# Patient Record
Sex: FEMALE | Race: WHITE | NOT HISPANIC OR LATINO | Employment: FULL TIME | URBAN - METROPOLITAN AREA
[De-identification: names, ages, dates, MRNs, and addresses within clinical notes are randomized per-mention and may not be internally consistent; named-entity substitution may affect disease eponyms.]

---

## 2019-04-19 ENCOUNTER — OFFICE VISIT (OUTPATIENT)
Dept: OBSTETRICS AND GYNECOLOGY | Age: 24
End: 2019-04-19

## 2019-04-19 VITALS
DIASTOLIC BLOOD PRESSURE: 68 MMHG | SYSTOLIC BLOOD PRESSURE: 114 MMHG | BODY MASS INDEX: 45.11 KG/M2 | HEIGHT: 64 IN | WEIGHT: 264.2 LBS

## 2019-04-19 DIAGNOSIS — Z01.419 WELL WOMAN EXAM WITH ROUTINE GYNECOLOGICAL EXAM: Primary | ICD-10-CM

## 2019-04-19 PROCEDURE — 99385 PREV VISIT NEW AGE 18-39: CPT | Performed by: NURSE PRACTITIONER

## 2019-04-19 RX ORDER — LEVONORGESTREL AND ETHINYL ESTRADIOL 0.1-0.02MG
1 KIT ORAL DAILY
Qty: 90 TABLET | Refills: 3 | Status: SHIPPED | OUTPATIENT
Start: 2019-04-19 | End: 2019-07-11 | Stop reason: SDUPTHER

## 2019-04-19 NOTE — PROGRESS NOTES
[facility]  Routine Annual Visit    2019    Patient: Desi Pierre          MR#:[id number]      History of Present Illness    23 y.o. female  who presents for annual exam as a new patient. Desi reports today will be her first pap smear- no prior GYN care. She reports normal, monthly cycles. She is sexually active with one partner and using condoms for contraception but is interested in starting oral contraceptives. Declines LARC. She denies any medical hx. She is working as an . She has no complaints today. Desi knows she needs to start watching her diet and increase her activity.     Patient's last menstrual period was 2019 (approximate).  Obstetric History:  OB History      Para Term  AB Living    0 0 0 0 0 0    SAB TAB Ectopic Molar Multiple Live Births    0 0 0 0 0 0         Menstrual History:  Menarche Age: 10 years  Patient's last menstrual period was 2019 (approximate).  Period Cycle (Days): 30  Period Duration (Days): 5  Dysmenorrhea: (!) Moderate  Dysmenorrhea Symptoms: Cramping    Sexual History:       ________________________________________  There is no problem list on file for this patient.      History reviewed. No pertinent past medical history.    History reviewed. No pertinent surgical history.    Social History     Tobacco Use   Smoking Status Never Smoker       Family History   Problem Relation Age of Onset   • Diabetes Mother    • Diabetes Father    • Polycystic ovary syndrome Sister        Prior to Admission medications    Medication Sig Start Date End Date Taking? Authorizing Provider   Loratadine-Pseudoephedrine (CLARITIN-D 12 HOUR PO) Take  by mouth.   Yes Provider, MD Erin     ________________________________________    The following portions of the patient's history were reviewed and updated as appropriate: allergies, current medications, past family history, past medical history, past social history, past surgical  "history and problem list.    Review of Systems   Constitutional: Negative.    HENT: Negative.    Eyes: Negative for visual disturbance.   Respiratory: Negative for cough, shortness of breath and wheezing.    Cardiovascular: Negative for chest pain, palpitations and leg swelling.   Gastrointestinal: Negative for abdominal distention, abdominal pain, blood in stool, constipation, diarrhea, nausea and vomiting.   Endocrine: Negative for cold intolerance and heat intolerance.   Genitourinary: Negative for difficulty urinating, dyspareunia, dysuria, frequency, genital sores, hematuria, menstrual problem, pelvic pain, urgency, vaginal bleeding, vaginal discharge and vaginal pain.   Musculoskeletal: Negative.    Skin: Negative.    Neurological: Negative for dizziness, weakness, light-headedness, numbness and headaches.   Hematological: Negative.    Psychiatric/Behavioral: Negative.    Breasts: negative for lumps skin changes, dimpling, swelling, nipple changes/discharge bilaterally       Objective   Physical Exam    /68   Ht 162.6 cm (64\")   Wt 120 kg (264 lb 3.2 oz)   LMP 03/27/2019 (Approximate)   Breastfeeding? No   BMI 45.35 kg/m²    BP Readings from Last 3 Encounters:   04/19/19 114/68      Wt Readings from Last 3 Encounters:   04/19/19 120 kg (264 lb 3.2 oz)        BMI: Estimated body mass index is 45.35 kg/m² as calculated from the following:    Height as of this encounter: 162.6 cm (64\").    Weight as of this encounter: 120 kg (264 lb 3.2 oz).        General:   alert, appears stated age and cooperative   Heart: regular rate and rhythm, S1, S2 normal, no murmur, click, rub or gallop   Lungs: clear to auscultation bilaterally   Abdomen: soft, non-tender, without masses or organomegaly   Breast: inspection negative, no nipple discharge or bleeding, no masses or nodularity palpable   Vulva: normal   Vagina: normal mucosa, normal discharge   Cervix: no cervical motion tenderness, no lesions and nulliparous " appearance   Uterus: exam is limited habitus    Adnexa: exam limited by habitus     Assessment:    1. Normal annual exam   2. OCP: initiation of contraceptive pills. We have discussed the risks and benefits of oral contraceptives. We have discussed the health benefits of oral contraceptives including improvement of acne, dysmenorrhea, PMS, decreased risk ovarian and uterine cancer later in life and decreased menstrual flow or suppression. We have also discussed possible side effects including nausea or vomiting at initiation, breast tenderness, and increased risk deep vein thrombosis. We have reviewed the ACHES (abdominal pain, chest pain, headache, leg pain, vision changes, shortness of breath) and to notify our office in the event of any of these symptoms. Proper use and start method discussed. Follow up 3 months for BP check.  3.  Counseled on healthy lifestyle modifications, safe sex, condom use, and self breast exams.          Plan:    [x]  Rx: lutera  []  Mammogram request made  [x]  PAP done  []  Occult fecal blood test (Insure)  []  Labs:   [x]  GC/Chl/TV  []  DEXA scan   []  Referral for colonoscopy:           ANGI Teran  4/19/2019 9:21 AM

## 2019-04-22 LAB
C TRACH RRNA SPEC QL NAA+PROBE: NEGATIVE
N GONORRHOEA RRNA SPEC QL NAA+PROBE: NEGATIVE
T VAGINALIS RRNA VAG QL NAA+PROBE: NEGATIVE

## 2019-04-23 ENCOUNTER — TELEPHONE (OUTPATIENT)
Dept: OBSTETRICS AND GYNECOLOGY | Age: 24
End: 2019-04-23

## 2019-04-23 LAB
CONV .: NORMAL
CYTOLOGIST CVX/VAG CYTO: NORMAL
CYTOLOGY CVX/VAG DOC THIN PREP: NORMAL
DX ICD CODE: NORMAL
HIV 1 & 2 AB SER-IMP: NORMAL
OTHER STN SPEC: NORMAL
PATH REPORT.FINAL DX SPEC: NORMAL
STAT OF ADQ CVX/VAG CYTO-IMP: NORMAL

## 2019-04-23 RX ORDER — FLUCONAZOLE 150 MG/1
150 TABLET ORAL ONCE
Qty: 1 TABLET | Refills: 0 | Status: SHIPPED | OUTPATIENT
Start: 2019-04-23 | End: 2019-04-23

## 2019-04-23 NOTE — TELEPHONE ENCOUNTER
----- Message from ANGI Connolly sent at 4/23/2019  8:48 AM EDT -----  Please inform patient her pap smear returned negative (normal). It did suggest she has a yeast infection so I have sent a diflucan to her pharmacy. Thank you.

## 2019-07-11 ENCOUNTER — TELEPHONE (OUTPATIENT)
Dept: OBSTETRICS AND GYNECOLOGY | Age: 24
End: 2019-07-11

## 2019-07-11 RX ORDER — LEVONORGESTREL AND ETHINYL ESTRADIOL 0.1-0.02MG
1 KIT ORAL DAILY
Qty: 90 TABLET | Refills: 3 | Status: SHIPPED | OUTPATIENT
Start: 2019-07-11 | End: 2020-06-03 | Stop reason: SDUPTHER

## 2019-07-11 NOTE — TELEPHONE ENCOUNTER
Patient called and would like you to send over a prescription for [facility]

## 2020-06-03 ENCOUNTER — OFFICE VISIT (OUTPATIENT)
Dept: OBSTETRICS AND GYNECOLOGY | Age: 25
End: 2020-06-03

## 2020-06-03 VITALS
BODY MASS INDEX: 42.2 KG/M2 | WEIGHT: 247.2 LBS | HEIGHT: 64 IN | SYSTOLIC BLOOD PRESSURE: 120 MMHG | DIASTOLIC BLOOD PRESSURE: 76 MMHG

## 2020-06-03 DIAGNOSIS — Z01.419 WELL WOMAN EXAM WITH ROUTINE GYNECOLOGICAL EXAM: Primary | ICD-10-CM

## 2020-06-03 PROCEDURE — 99395 PREV VISIT EST AGE 18-39: CPT | Performed by: NURSE PRACTITIONER

## 2020-06-03 RX ORDER — LEVONORGESTREL AND ETHINYL ESTRADIOL 0.1-0.02MG
1 KIT ORAL DAILY
Qty: 90 TABLET | Refills: 3 | Status: SHIPPED | OUTPATIENT
Start: 2020-06-03 | End: 2020-06-05 | Stop reason: SDUPTHER

## 2020-06-03 NOTE — PROGRESS NOTES
[facility]  Routine Annual Visit    6/3/2020    Patient: Desi Pierre          MR#:[id number]      History of Present Illness    25 y.o. female  who presents for annual exam.    Last pap negative 2019.  Continues on OCPs with good cycle control and no problem. Requests refill.  Desi denies new medical hx.   Denies new partners.     Nearly 20 pound weight loss noted and Desi is commended on this! Continued weight loss encouraged.  We discussed need for routine labs including diabetes screening- she reports yearly biometric screening. Encouraged yearly physicals with Dr. García as well.    Desi voices no complaints or concerns today.    She is now working as a  and loves it!    Patient's last menstrual period was 2020 (approximate).  Obstetric History:  OB History        0    Para   0    Term   0       0    AB   0    Living   0       SAB   0    TAB   0    Ectopic   0    Molar   0    Multiple   0    Live Births   0               Menstrual History:     Patient's last menstrual period was 2020 (approximate).       Sexual History:       ________________________________________  There is no problem list on file for this patient.      History reviewed. No pertinent past medical history.    History reviewed. No pertinent surgical history.    Social History     Tobacco Use   Smoking Status Never Smoker       Family History   Problem Relation Age of Onset   • Diabetes Mother    • Diabetes Father    • Polycystic ovary syndrome Sister        Prior to Admission medications    Medication Sig Start Date End Date Taking? Authorizing Provider   levonorgestrel-ethinyl estradiol (AVIANE,ALESSE,LESSINA) 0.1-20 MG-MCG per tablet Take 1 tablet by mouth Daily. 7/11/19 7/10/20 Yes Bridgette Barker APRN   Loratadine-Pseudoephedrine (CLARITIN-D 12 HOUR PO) Take  by mouth.   Yes ProviderErin MD     ________________________________________    The following  "portions of the patient's history were reviewed and updated as appropriate: allergies, current medications, past family history, past medical history, past social history, past surgical history and problem list.    Review of Systems   Constitutional: Negative.    HENT: Negative.    Eyes: Negative for visual disturbance.   Respiratory: Negative for cough, shortness of breath and wheezing.    Cardiovascular: Negative for chest pain, palpitations and leg swelling.   Gastrointestinal: Negative for abdominal distention, abdominal pain, blood in stool, constipation, diarrhea, nausea and vomiting.   Endocrine: Negative for cold intolerance and heat intolerance.   Genitourinary: Negative for difficulty urinating, dyspareunia, dysuria, frequency, genital sores, hematuria, menstrual problem, pelvic pain, urgency, vaginal bleeding, vaginal discharge and vaginal pain.   Musculoskeletal: Negative.    Skin: Negative.    Neurological: Negative for dizziness, weakness, light-headedness, numbness and headaches.   Hematological: Negative.    Psychiatric/Behavioral: Negative.    Breasts: negative for lumps skin changes, dimpling, swelling, nipple changes/discharge bilaterally         Objective   Physical Exam    /76   Ht 162.6 cm (64\")   Wt 112 kg (247 lb 3.2 oz)   LMP 05/11/2020 (Approximate)   Breastfeeding No   BMI 42.43 kg/m²    BP Readings from Last 3 Encounters:   06/03/20 120/76   04/19/19 114/68      Wt Readings from Last 3 Encounters:   06/03/20 112 kg (247 lb 3.2 oz)   04/19/19 120 kg (264 lb 3.2 oz)        BMI: Estimated body mass index is 42.43 kg/m² as calculated from the following:    Height as of this encounter: 162.6 cm (64\").    Weight as of this encounter: 112 kg (247 lb 3.2 oz).          General:   alert, appears stated age and cooperative   Heart: regular rate and rhythm, S1, S2 normal, no murmur, click, rub or gallop   Lungs: clear to auscultation bilaterally   Abdomen: soft, non-tender, without masses " or organomegaly   Breast: inspection negative, no nipple discharge or bleeding, no masses or nodularity palpable   Vulva: normal   Vagina: normal mucosa, normal discharge   Cervix: no cervical motion tenderness and no lesions   Uterus: normal size, mobile, non-tender or normal shape and consistency   Adnexa: exam limited by habitus     Assessment:    1. Normal annual exam   2. Contraception- doing well on OCPs and desires to continue. Risks, benefits, alternatives, and proper use reinforced.   3.  Counseled on healthy lifestyle modifications, safe sex, condom use, and self breast exams.    All of the patient's questions were addressed and answered, I have encouraged her to call for today's test results if she has not received them within 10 days.  Patient is advised to call with any change in her condition or with any other questions, otherwise return in 12 months for annual examination.    Plan:    []  Rx:   []  Mammogram request made  [x]  PAP done  []  Occult fecal blood test (Insure)  []  Labs:   []  GC/Chl/TV  []  DEXA scan   []  Referral for colonoscopy:           ANGI Teran  6/3/2020 13:57

## 2020-06-05 ENCOUNTER — TELEPHONE (OUTPATIENT)
Dept: OBSTETRICS AND GYNECOLOGY | Age: 25
End: 2020-06-05

## 2020-06-05 LAB
CONV .: NORMAL
CYTOLOGIST CVX/VAG CYTO: NORMAL
CYTOLOGY CVX/VAG DOC CYTO: NORMAL
CYTOLOGY CVX/VAG DOC THIN PREP: NORMAL
DX ICD CODE: NORMAL
HIV 1 & 2 AB SER-IMP: NORMAL
OTHER STN SPEC: NORMAL
STAT OF ADQ CVX/VAG CYTO-IMP: NORMAL

## 2020-06-05 RX ORDER — LEVONORGESTREL AND ETHINYL ESTRADIOL 0.1-0.02MG
1 KIT ORAL DAILY
Qty: 90 TABLET | Refills: 3 | Status: SHIPPED | OUTPATIENT
Start: 2020-06-05 | End: 2021-04-29

## 2020-06-05 NOTE — TELEPHONE ENCOUNTER
(Bridgette pt) Pt was needing her bcp that were called in yesterday [facility]. Can we fix this?

## 2020-06-05 NOTE — TELEPHONE ENCOUNTER
I must've heard the voicemail wrong, called patient to clarify her insurance changed and they no longer need it [facility]. Pt would like it sent to the [facility] listed on her file!

## 2020-06-08 ENCOUNTER — TELEPHONE (OUTPATIENT)
Dept: OBSTETRICS AND GYNECOLOGY | Age: 25
End: 2020-06-08

## 2020-06-08 NOTE — TELEPHONE ENCOUNTER
----- Message from ANGI Connolly sent at 6/5/2020 12:45 PM EDT -----  Please inform patient her pap smear returned negative (normal). Thank you.

## 2021-04-29 RX ORDER — LEVONORGESTREL AND ETHINYL ESTRADIOL 0.1-0.02MG
KIT ORAL
Qty: 84 TABLET | Refills: 0 | Status: SHIPPED | OUTPATIENT
Start: 2021-04-29 | End: 2021-07-27

## 2021-06-11 ENCOUNTER — OFFICE VISIT (OUTPATIENT)
Dept: OBSTETRICS AND GYNECOLOGY | Age: 26
End: 2021-06-11

## 2021-06-11 VITALS
SYSTOLIC BLOOD PRESSURE: 132 MMHG | BODY MASS INDEX: 42 KG/M2 | WEIGHT: 246 LBS | HEIGHT: 64 IN | DIASTOLIC BLOOD PRESSURE: 88 MMHG

## 2021-06-11 DIAGNOSIS — Z01.419 WELL WOMAN EXAM WITH ROUTINE GYNECOLOGICAL EXAM: Primary | ICD-10-CM

## 2021-06-11 PROCEDURE — 99395 PREV VISIT EST AGE 18-39: CPT | Performed by: NURSE PRACTITIONER

## 2021-06-11 NOTE — PROGRESS NOTES
[facility]  Routine Annual Visit    2021    Patient: Desi Pierre          MR#:[id number]      History of Present Illness    26 y.o. female  who presents for annual exam.    Continues on OCPs with good cycle control and no problems- requests refill  Denies new medical hx  Teaching still going well!  Got engaged last year! Wedding set for 2022    BP mildly elevated today- repeat 124/88- recommend recheck in 2 weeks    No LMP recorded.  Obstetric History:  OB History        0    Para   0    Term   0       0    AB   0    Living   0       SAB   0    TAB   0    Ectopic   0    Molar   0    Multiple   0    Live Births   0               Menstrual History:     No LMP recorded.       Sexual History:       ________________________________________  There is no problem list on file for this patient.      History reviewed. No pertinent past medical history.    History reviewed. No pertinent surgical history.    Social History     Tobacco Use   Smoking Status Never Smoker       Family History   Problem Relation Age of Onset   • Diabetes Mother    • Diabetes Father    • Polycystic ovary syndrome Sister        Prior to Admission medications    Medication Sig Start Date End Date Taking? Authorizing Provider   Loratadine-Pseudoephedrine (CLARITIN-D 12 HOUR PO) Take  by mouth.    Provider, Historical, MD   Vienva 0.1-20 MG-MCG per tablet TAKE ONE TABLET BY MOUTH DAILY 21   Bridgette Barker, APRN     ________________________________________    The following portions of the patient's history were reviewed and updated as appropriate: allergies, current medications, past family history, past medical history, past social history, past surgical history and problem list.    Review of Systems   Constitutional: Negative.    HENT: Negative.    Eyes: Negative for visual disturbance.   Respiratory: Negative for cough, shortness of breath and wheezing.    Cardiovascular: Negative for chest pain,  "palpitations and leg swelling.   Gastrointestinal: Negative for abdominal distention, abdominal pain, blood in stool, constipation, diarrhea, nausea and vomiting.   Endocrine: Negative for cold intolerance and heat intolerance.   Genitourinary: Negative for difficulty urinating, dyspareunia, dysuria, frequency, genital sores, hematuria, menstrual problem, pelvic pain, urgency, vaginal bleeding, vaginal discharge and vaginal pain.   Musculoskeletal: Negative.    Skin: Negative.    Neurological: Negative for dizziness, weakness, light-headedness, numbness and headaches.   Hematological: Negative.    Psychiatric/Behavioral: Negative.    Breasts: negative for lumps skin changes, dimpling, swelling, nipple changes/discharge bilaterally       Objective   Physical Exam    /88   Ht 162.6 cm (64\")   Wt 112 kg (246 lb)   Breastfeeding No   BMI 42.23 kg/m²    BP Readings from Last 3 Encounters:   06/11/21 132/88   06/03/20 120/76   04/19/19 114/68      Wt Readings from Last 3 Encounters:   06/11/21 112 kg (246 lb)   06/03/20 112 kg (247 lb 3.2 oz)   04/19/19 120 kg (264 lb 3.2 oz)        BMI: Estimated body mass index is 42.23 kg/m² as calculated from the following:    Height as of this encounter: 162.6 cm (64\").    Weight as of this encounter: 112 kg (246 lb).          General:   alert, appears stated age and cooperative   Heart: regular rate and rhythm, S1, S2 normal, no murmur, click, rub or gallop   Lungs: clear to auscultation bilaterally   Abdomen: soft, non-tender, without masses or organomegaly   Breast: inspection negative, no nipple discharge or bleeding, no masses or nodularity palpable   Vulva: External genitalia including bartholin's glands, Urethra, [place]'s gland and urethra meatus are normal, Perineum, rectum and anus appear normal  and Bladder appears normal without significant prolapse    Vagina: normal mucosa, normal discharge   Cervix: no cervical motion tenderness and no lesions   Uterus: normal " size, mobile, non-tender and normal shape and consistency   Adnexa: no mass, fullness, tenderness     Assessment:    Diagnoses and all orders for this visit:    1. Well woman exam with routine gynecological exam (Primary)  -     IgP, Aptima HPV        Healthy lifestyle modifications discussed, counseled on self breast exams and bone health    All of the patient's questions were addressed and answered, I have encouraged her to call for today's test results if she has not received them within 10 days.  Patient is advised to call with any change in her condition or with any other questions, otherwise return in 12 months for annual examination.    Bridgette Barker, ANGI  6/11/2021 10:53 EDT

## 2021-06-15 ENCOUNTER — TELEPHONE (OUTPATIENT)
Dept: OBSTETRICS AND GYNECOLOGY | Age: 26
End: 2021-06-15

## 2021-06-15 LAB
CYTOLOGIST CVX/VAG CYTO: NORMAL
CYTOLOGY CVX/VAG DOC CYTO: NORMAL
CYTOLOGY CVX/VAG DOC THIN PREP: NORMAL
DX ICD CODE: NORMAL
HIV 1 & 2 AB SER-IMP: NORMAL
HPV I/H RISK 4 DNA CVX QL PROBE+SIG AMP: NEGATIVE
OTHER STN SPEC: NORMAL
STAT OF ADQ CVX/VAG CYTO-IMP: NORMAL

## 2021-06-30 ENCOUNTER — TELEPHONE (OUTPATIENT)
Dept: OBSTETRICS AND GYNECOLOGY | Age: 26
End: 2021-06-30

## 2021-07-27 RX ORDER — LEVONORGESTREL AND ETHINYL ESTRADIOL 0.1-0.02MG
KIT ORAL
Qty: 84 TABLET | Refills: 3 | Status: SHIPPED | OUTPATIENT
Start: 2021-07-27 | End: 2022-06-03 | Stop reason: SDUPTHER

## 2022-06-03 ENCOUNTER — TELEPHONE (OUTPATIENT)
Dept: OBSTETRICS AND GYNECOLOGY | Age: 27
End: 2022-06-03

## 2022-06-03 RX ORDER — LEVONORGESTREL AND ETHINYL ESTRADIOL 0.1-0.02MG
1 KIT ORAL DAILY
Qty: 84 TABLET | Refills: 3 | Status: SHIPPED | OUTPATIENT
Start: 2022-06-03

## 2022-06-03 NOTE — TELEPHONE ENCOUNTER
Provider: ANGI THOMAS  Caller: DHIRAJ ARAUJO  Relationship to Patient: SELF  Phone Number: [phone]  Reason for Call: PT CALLED IN TO SCHEDULE ANNUAL APPT (07/21/22). PT STATED SHE WOULD RUN OUT OF THE VIENVA PRIOR TO THE APPT. WANTS TO KNOW IF IT COULD REFILLED.     PT WOULD LIKE A CALL BACK, ANYTIME, OKAY TO LEAVE A VM

## 2022-07-21 ENCOUNTER — OFFICE VISIT (OUTPATIENT)
Dept: OBSTETRICS AND GYNECOLOGY | Age: 27
End: 2022-07-21

## 2022-07-21 VITALS
DIASTOLIC BLOOD PRESSURE: 88 MMHG | WEIGHT: 233.8 LBS | SYSTOLIC BLOOD PRESSURE: 132 MMHG | BODY MASS INDEX: 39.91 KG/M2 | HEIGHT: 64 IN

## 2022-07-21 DIAGNOSIS — Z01.419 WELL WOMAN EXAM WITH ROUTINE GYNECOLOGICAL EXAM: Primary | ICD-10-CM

## 2022-07-21 PROCEDURE — 99395 PREV VISIT EST AGE 18-39: CPT | Performed by: NURSE PRACTITIONER

## 2022-07-21 RX ORDER — LORATADINE AND PSEUDOEPHEDRINE SULFATE 10; 240 MG/1; MG/1
1 TABLET, EXTENDED RELEASE ORAL DAILY
COMMUNITY

## 2022-07-21 NOTE — PROGRESS NOTES
[facility]  Routine Annual Visit    2022    Patient: Desi Pierre          MR#:[id number]      History of Present Illness    27 y.o. female  who presents for annual exam with no complaints and no change in medical history    Got  earlier this month! No plans for children in the next year. Would like to continue on OCPs. BP mildly elevated today- repeat 128/86. Recommend monitoring and following up with PCP. Reinforced contraindication with OCPs and hypertension.    Patient's last menstrual period was 2022 (approximate).  Obstetric History:  OB History        0    Para   0    Term   0       0    AB   0    Living   0       SAB   0    IAB   0    Ectopic   0    Molar   0    Multiple   0    Live Births   0               Menstrual History:     Patient's last menstrual period was 2022 (approximate).       Sexual History:       ________________________________________  There is no problem list on file for this patient.      History reviewed. No pertinent past medical history.    History reviewed. No pertinent surgical history.    Social History     Tobacco Use   Smoking Status Never Smoker   Smokeless Tobacco Never Used       Family History   Problem Relation Age of Onset   • Diabetes Mother    • Diabetes Father    • Polycystic ovary syndrome Sister        Prior to Admission medications    Medication Sig Start Date End Date Taking? Authorizing Provider   levonorgestrel-ethinyl estradiol (Vienva) 0.1-20 MG-MCG per tablet Take 1 tablet by mouth Daily. 6/3/22  Yes Nora Dodd APRN   loratadine-pseudoephedrine (Claritin-D 24 Hour)  MG per 24 hr tablet Take 1 tablet by mouth Daily.   Yes Provider, Historical, MD       The following portions of the patient's history were reviewed and updated as appropriate: allergies, current medications, past family history, past medical history, past social history, past surgical history and problem list.    Review of  "Systems   Constitutional: Negative.    HENT: Negative.    Eyes: Negative for visual disturbance.   Respiratory: Negative for cough, shortness of breath and wheezing.    Cardiovascular: Negative for chest pain, palpitations and leg swelling.   Gastrointestinal: Negative for abdominal distention, abdominal pain, blood in stool, constipation, diarrhea, nausea and vomiting.   Endocrine: Negative for cold intolerance and heat intolerance.   Genitourinary: Negative for difficulty urinating, dyspareunia, dysuria, frequency, genital sores, hematuria, menstrual problem, pelvic pain, urgency, vaginal bleeding, vaginal discharge and vaginal pain.   Musculoskeletal: Negative.    Skin: Negative.    Neurological: Negative for dizziness, weakness, light-headedness, numbness and headaches.   Hematological: Negative.    Psychiatric/Behavioral: Negative.    Breasts: negative for lumps skin changes, dimpling, swelling, nipple changes/discharge bilaterally         Objective   Physical Exam    /88   Ht 162.6 cm (64\")   Wt 106 kg (233 lb 12.8 oz)   LMP 06/27/2022 (Approximate)   Breastfeeding No   BMI 40.13 kg/m²    BP Readings from Last 3 Encounters:   07/21/22 132/88   06/30/22 131/85   06/11/21 132/88      Wt Readings from Last 3 Encounters:   07/21/22 106 kg (233 lb 12.8 oz)   06/30/22 104 kg (230 lb)   06/11/21 112 kg (246 lb)        BMI: Estimated body mass index is 40.13 kg/m² as calculated from the following:    Height as of this encounter: 162.6 cm (64\").    Weight as of this encounter: 106 kg (233 lb 12.8 oz).            General:   alert, appears stated age and cooperative   Heart: regular rate and rhythm, S1, S2 normal, no murmur, click, rub or gallop   Lungs: clear to auscultation bilaterally   Abdomen: soft, non-tender, without masses or organomegaly   Breast: inspection negative, no nipple discharge or bleeding, no masses or nodularity palpable   Vulva: External genitalia including bartholin's glands, Urethra, " [place]'s gland and urethra meatus are normal, Perineum, rectum and anus appear normal  and Bladder appears normal without significant prolapse    Vagina: normal mucosa, normal discharge   Cervix: no cervical motion tenderness and no lesions   Uterus: normal size, mobile, non-tender and normal shape and consistency   Adnexa: no mass, fullness, tenderness     Assessment:    Diagnoses and all orders for this visit:    1. Well woman exam with routine gynecological exam (Primary)  -     IGP, Rfx Aptima HPV ASCU      Healthy lifestyle modifications discussed, counseled on self breast exams and bone health    All of the patient's questions were addressed and answered, I have encouraged her to call for today's test results if she has not received them within 10 days.  Patient is advised to call with any change in her condition or with any other questions, otherwise return in 12 months for annual examination.        Bridgette Barker, APRN  7/21/2022 12:47 EDT

## 2022-07-26 LAB
CONV .: NORMAL
CYTOLOGIST CVX/VAG CYTO: NORMAL
CYTOLOGY CVX/VAG DOC CYTO: NORMAL
CYTOLOGY CVX/VAG DOC THIN PREP: NORMAL
DX ICD CODE: NORMAL
HIV 1 & 2 AB SER-IMP: NORMAL
Lab: NORMAL
OTHER STN SPEC: NORMAL
STAT OF ADQ CVX/VAG CYTO-IMP: NORMAL

## 2023-08-01 ENCOUNTER — OFFICE VISIT (OUTPATIENT)
Dept: OBSTETRICS AND GYNECOLOGY | Age: 28
End: 2023-08-01
Payer: COMMERCIAL

## 2023-08-01 VITALS
WEIGHT: 237.8 LBS | DIASTOLIC BLOOD PRESSURE: 68 MMHG | BODY MASS INDEX: 40.6 KG/M2 | SYSTOLIC BLOOD PRESSURE: 110 MMHG | HEIGHT: 64 IN

## 2023-08-01 DIAGNOSIS — Z34.01 ENCOUNTER FOR SUPERVISION OF NORMAL FIRST PREGNANCY IN FIRST TRIMESTER: ICD-10-CM

## 2023-08-01 DIAGNOSIS — R03.0 PRE-HYPERTENSION: ICD-10-CM

## 2023-08-01 DIAGNOSIS — Z3A.09 9 WEEKS GESTATION OF PREGNANCY: Primary | ICD-10-CM

## 2023-08-01 LAB — EXTERNAL GROUP B STREP ANTIGEN: POSITIVE

## 2023-08-02 PROBLEM — Z67.91 RH NEGATIVE STATUS DURING PREGNANCY IN FIRST TRIMESTER: Status: ACTIVE | Noted: 2023-08-02

## 2023-08-02 PROBLEM — O26.891 RH NEGATIVE STATUS DURING PREGNANCY IN FIRST TRIMESTER: Status: ACTIVE | Noted: 2023-08-02

## 2023-08-02 LAB
ABO GROUP BLD: ABNORMAL
BASOPHILS # BLD AUTO: 0 X10E3/UL (ref 0–0.2)
BASOPHILS NFR BLD AUTO: 0 %
BLD GP AB SCN SERPL QL: NEGATIVE
EOSINOPHIL # BLD AUTO: 0 X10E3/UL (ref 0–0.4)
EOSINOPHIL NFR BLD AUTO: 0 %
ERYTHROCYTE [DISTWIDTH] IN BLOOD BY AUTOMATED COUNT: 11.8 % (ref 11.7–15.4)
HBV SURFACE AG SERPL QL IA: NEGATIVE
HCT VFR BLD AUTO: 43.9 % (ref 34–46.6)
HCV IGG SERPL QL IA: NON REACTIVE
HGB A MFR BLD ELPH: 97.3 % (ref 96.4–98.8)
HGB A2 MFR BLD ELPH: 2.7 % (ref 1.8–3.2)
HGB BLD-MCNC: 14.8 G/DL (ref 11.1–15.9)
HGB F MFR BLD ELPH: 0 % (ref 0–2)
HGB FRACT BLD-IMP: NORMAL
HGB S MFR BLD ELPH: 0 %
HIV 1+2 AB+HIV1 P24 AG SERPL QL IA: NON REACTIVE
IMM GRANULOCYTES # BLD AUTO: 0.1 X10E3/UL (ref 0–0.1)
IMM GRANULOCYTES NFR BLD AUTO: 1 %
LYMPHOCYTES # BLD AUTO: 2.2 X10E3/UL (ref 0.7–3.1)
LYMPHOCYTES NFR BLD AUTO: 22 %
MCH RBC QN AUTO: 29.4 PG (ref 26.6–33)
MCHC RBC AUTO-ENTMCNC: 33.7 G/DL (ref 31.5–35.7)
MCV RBC AUTO: 87 FL (ref 79–97)
MONOCYTES # BLD AUTO: 0.6 X10E3/UL (ref 0.1–0.9)
MONOCYTES NFR BLD AUTO: 6 %
NEUTROPHILS # BLD AUTO: 7.1 X10E3/UL (ref 1.4–7)
NEUTROPHILS NFR BLD AUTO: 71 %
PLATELET # BLD AUTO: 263 X10E3/UL (ref 150–450)
RBC # BLD AUTO: 5.03 X10E6/UL (ref 3.77–5.28)
RH BLD: NEGATIVE
RPR SER QL: NON REACTIVE
RUBV IGG SERPL IA-ACNC: 1.19 INDEX
VZV IGG SER IA-ACNC: 198 INDEX
WBC # BLD AUTO: 10.1 X10E3/UL (ref 3.4–10.8)

## 2023-08-03 LAB
C TRACH RRNA SPEC QL NAA+PROBE: NEGATIVE
N GONORRHOEA RRNA SPEC QL NAA+PROBE: NEGATIVE
T VAGINALIS RRNA SPEC QL NAA+PROBE: NEGATIVE

## 2023-08-04 LAB
BACTERIA UR CULT: ABNORMAL
BACTERIA UR CULT: ABNORMAL

## 2023-08-07 PROBLEM — R82.71 GROUP B STREPTOCOCCAL BACTERIURIA: Status: ACTIVE | Noted: 2023-08-07

## 2023-08-07 RX ORDER — AMOXICILLIN 500 MG/1
500 CAPSULE ORAL 2 TIMES DAILY
Qty: 14 CAPSULE | Refills: 0 | Status: SHIPPED | OUTPATIENT
Start: 2023-08-07 | End: 2023-08-14

## 2023-08-15 ENCOUNTER — INITIAL PRENATAL (OUTPATIENT)
Dept: OBSTETRICS AND GYNECOLOGY | Age: 28
End: 2023-08-15
Payer: COMMERCIAL

## 2023-08-15 VITALS — SYSTOLIC BLOOD PRESSURE: 122 MMHG | DIASTOLIC BLOOD PRESSURE: 68 MMHG | WEIGHT: 240.4 LBS | BODY MASS INDEX: 41.26 KG/M2

## 2023-08-15 DIAGNOSIS — Z36.8A ENCOUNTER FOR ANTENATAL SCREENING FOR OTHER GENETIC DEFECT: ICD-10-CM

## 2023-08-15 DIAGNOSIS — Z34.81 PRENATAL CARE, SUBSEQUENT PREGNANCY, FIRST TRIMESTER: ICD-10-CM

## 2023-08-15 DIAGNOSIS — Z34.01 ENCOUNTER FOR SUPERVISION OF NORMAL FIRST PREGNANCY IN FIRST TRIMESTER: ICD-10-CM

## 2023-08-15 DIAGNOSIS — Z13.89 SCREENING FOR BLOOD OR PROTEIN IN URINE: Primary | ICD-10-CM

## 2023-08-15 PROCEDURE — 0501F PRENATAL FLOW SHEET: CPT | Performed by: OBSTETRICS & GYNECOLOGY

## 2023-08-15 NOTE — PROGRESS NOTES
Chief Complaint   Patient presents with    Routine Prenatal Visit     Ob intake, 11w3d, genetic testing today, no complaints     Desi Espinoza is doing well, fatigue but no sig N/V. No cramping or bleeding  Genetic screening today, desires gender   on US    FU 4 wks    Paloma Rodriguez MD  8/15/2023.  14:40 EDT

## 2023-08-22 ENCOUNTER — TELEPHONE (OUTPATIENT)
Facility: HOSPITAL | Age: 28
End: 2023-08-22
Payer: COMMERCIAL

## 2023-08-22 NOTE — TELEPHONE ENCOUNTER
Please call her back, she can come in this afternoon for a redraw if she is able. I would like to dopple her as well, can add on as a visit    Paloma Rodriguez MD  8/22/2023  12:43 EDT

## 2023-08-22 NOTE — TELEPHONE ENCOUNTER
Pt called stating she received a letter from [facility] stating she has insufficient fetal DNA. Pt would like to know when she can have her blood re-drawn and does not want to wait till her next appt.     Please advise

## 2023-08-23 ENCOUNTER — CLINICAL SUPPORT (OUTPATIENT)
Dept: OBSTETRICS AND GYNECOLOGY | Age: 28
End: 2023-08-23
Payer: COMMERCIAL

## 2023-08-23 ENCOUNTER — TELEPHONE (OUTPATIENT)
Dept: OBSTETRICS AND GYNECOLOGY | Age: 28
End: 2023-08-23
Payer: COMMERCIAL

## 2023-08-23 DIAGNOSIS — Z34.81 PRENATAL CARE, SUBSEQUENT PREGNANCY, FIRST TRIMESTER: Primary | ICD-10-CM

## 2023-08-25 LAB
Lab: NEGATIVE
Lab: NORMAL
NTRA ALPHA-THALASSEMIA: NEGATIVE
NTRA BETA-HEMOGLOBINOPATHIES: NEGATIVE
NTRA CANAVAN DISEASE: NEGATIVE
NTRA CYSTIC FIBROSIS: NEGATIVE
NTRA DUCHENNE/BECKER MUSCULAR DYSTROPHY: NEGATIVE
NTRA FAMILIAL DYSAUTONOMIA: NEGATIVE
NTRA FRAGILE X SYNDROME: NEGATIVE
NTRA GALACTOSEMIA: NEGATIVE
NTRA GAUCHER DISEASE: NEGATIVE
NTRA MEDIUM CHAIN ACYL-COA DEHYDROGENASE DEFICIENCY: NEGATIVE
NTRA POLYCYSTIC KIDNEY DISEASE, AUTOSOMAL RECESSIVE: NEGATIVE
NTRA SMITH-LEMLI-OPITZ SYNDROME: NEGATIVE
NTRA SPINAL MUSCULAR ATROPHY: NEGATIVE
NTRA TAY-SACHS DISEASE: NEGATIVE

## 2023-08-27 NOTE — PROGRESS NOTES
Please notify that her carrier screening was negative for common genetic problems that she may silently carry and pass onto her pregnancy    Paloma Rodriguez MD  8/27/2023  17:02 EDT

## 2023-09-11 ENCOUNTER — TELEPHONE (OUTPATIENT)
Dept: OBSTETRICS AND GYNECOLOGY | Age: 28
End: 2023-09-11
Payer: COMMERCIAL

## 2023-09-11 NOTE — TELEPHONE ENCOUNTER
Please notify that her baby's genetic screening is low risk, and it is a girl!    Paloma Rodriguez MD  9/11/2023  10:22 EDT

## 2023-09-12 ENCOUNTER — ROUTINE PRENATAL (OUTPATIENT)
Dept: OBSTETRICS AND GYNECOLOGY | Age: 28
End: 2023-09-12
Payer: COMMERCIAL

## 2023-09-12 VITALS — WEIGHT: 253 LBS | DIASTOLIC BLOOD PRESSURE: 70 MMHG | BODY MASS INDEX: 43.43 KG/M2 | SYSTOLIC BLOOD PRESSURE: 126 MMHG

## 2023-09-12 DIAGNOSIS — Z91.09 ENVIRONMENTAL ALLERGIES: Primary | ICD-10-CM

## 2023-09-12 DIAGNOSIS — Z13.89 SCREENING FOR BLOOD OR PROTEIN IN URINE: ICD-10-CM

## 2023-09-12 LAB
GLUCOSE UR STRIP-MCNC: NEGATIVE MG/DL
PROT UR STRIP-MCNC: NEGATIVE MG/DL

## 2023-09-12 RX ORDER — PRENATAL VIT NO.126/IRON/FOLIC 28MG-0.8MG
TABLET ORAL DAILY
COMMUNITY

## 2023-09-12 RX ORDER — MONTELUKAST SODIUM 10 MG/1
10 TABLET ORAL DAILY
Qty: 90 TABLET | Refills: 3 | Status: SHIPPED | OUTPATIENT
Start: 2023-09-12 | End: 2024-09-11

## 2023-09-12 NOTE — PROGRESS NOTES
Chief Complaint   Patient presents with    Routine Prenatal Visit     CC: Ob check, 15w3d, no complaints     Desi Espinoza is doing well, just had vomiting after eating fried chicken the other day but otherwise able to eat well.  No cramping or bleeding.    Her allergies have been bad lately and she also notes shortness of breath.  Discussed that some shortness of breath is normal during pregnancy.  I sent in Singulair as this is helped in the past with her allergies.  Also discussed using Flonase.    Her genetic screening was low risk, female gender    Return for four weeks ob folllow up with anatomy scan please.    Paloma Rodriguez MD  9/12/2023  16:31 EDT

## 2023-10-10 ENCOUNTER — ROUTINE PRENATAL (OUTPATIENT)
Dept: OBSTETRICS AND GYNECOLOGY | Age: 28
End: 2023-10-10
Payer: COMMERCIAL

## 2023-10-10 VITALS — DIASTOLIC BLOOD PRESSURE: 76 MMHG | SYSTOLIC BLOOD PRESSURE: 130 MMHG | BODY MASS INDEX: 42.57 KG/M2 | WEIGHT: 248 LBS

## 2023-10-10 DIAGNOSIS — Z34.01 ENCOUNTER FOR SUPERVISION OF NORMAL FIRST PREGNANCY IN FIRST TRIMESTER: ICD-10-CM

## 2023-10-10 DIAGNOSIS — Z13.89 SCREENING FOR BLOOD OR PROTEIN IN URINE: Primary | ICD-10-CM

## 2023-10-10 DIAGNOSIS — R03.0 PRE-HYPERTENSION: ICD-10-CM

## 2023-10-10 LAB
GLUCOSE UR STRIP-MCNC: NEGATIVE MG/DL
PROT UR STRIP-MCNC: NEGATIVE MG/DL

## 2023-10-10 RX ORDER — OMEPRAZOLE 40 MG/1
40 CAPSULE, DELAYED RELEASE ORAL DAILY
Qty: 90 CAPSULE | Refills: 3 | Status: SHIPPED | OUTPATIENT
Start: 2023-10-10

## 2023-10-10 RX ORDER — ONDANSETRON 4 MG/1
4 TABLET, FILM COATED ORAL EVERY 6 HOURS PRN
Qty: 30 TABLET | Refills: 1 | Status: SHIPPED | OUTPATIENT
Start: 2023-10-10 | End: 2024-10-09

## 2023-10-10 RX ORDER — NYSTATIN 100000 [USP'U]/G
POWDER TOPICAL 3 TIMES DAILY
Qty: 30 G | Refills: 2 | Status: SHIPPED | OUTPATIENT
Start: 2023-10-10

## 2023-10-10 NOTE — PROGRESS NOTES
Chief Complaint   Patient presents with    Routine Prenatal Visit     CC: ob check, 19w3d, getting more sick/vomiting      Desi Espinoza is doing well but having random vomiting about four hours after meals. Doesn't seem to have heartburn. Doesn't seem to be any particular foods. Hasn't had N/V before now  No cramping or bleeding  Anatomy normal but incomplete, FU 4 wks  AFP Screen today  Declines flu today    Paloma Rodriguez MD  10/10/2023  16:32 EDT

## 2023-10-12 LAB
AFP INTERP SERPL-IMP: NORMAL
AFP INTERP SERPL-IMP: NORMAL
AFP MOM SERPL: 1.04
AFP SERPL-MCNC: 40.8 NG/ML
AGE AT DELIVERY: 30.8 YR
GA METHOD: NORMAL
GA: 19.4 WEEKS
IDDM PATIENT QL: NO
LABORATORY COMMENT REPORT: NORMAL
MULTIPLE PREGNANCY: NO
NEURAL TUBE DEFECT RISK FETUS: NORMAL %
RESULT: NORMAL

## 2023-10-12 NOTE — PROGRESS NOTES
Please notify patient that her AFP screen for open spina bifida was negative    Paloma Rodriguez MD  10/12/2023  08:47 EDT

## 2023-11-07 ENCOUNTER — ROUTINE PRENATAL (OUTPATIENT)
Dept: OBSTETRICS AND GYNECOLOGY | Age: 28
End: 2023-11-07
Payer: COMMERCIAL

## 2023-11-07 VITALS — SYSTOLIC BLOOD PRESSURE: 128 MMHG | BODY MASS INDEX: 42.33 KG/M2 | DIASTOLIC BLOOD PRESSURE: 74 MMHG | WEIGHT: 246.6 LBS

## 2023-11-07 DIAGNOSIS — Z13.89 SCREENING FOR BLOOD OR PROTEIN IN URINE: Primary | ICD-10-CM

## 2023-11-07 DIAGNOSIS — Z34.01 ENCOUNTER FOR SUPERVISION OF NORMAL FIRST PREGNANCY IN FIRST TRIMESTER: ICD-10-CM

## 2023-11-07 LAB
GLUCOSE UR STRIP-MCNC: NEGATIVE MG/DL
PROT UR STRIP-MCNC: NEGATIVE MG/DL

## 2023-11-07 NOTE — PROGRESS NOTES
Chief Complaint   Patient presents with    Routine Prenatal Visit     23w3d OB Check and Anatomy US     Desi Espinoza is overall doing well.  She does feel some fetal movement but not super consistently.  Her anatomy scan yesterday was normal and complete  No cramping, contractions  She declines flu vaccine today    She notes some increase in reflux, discussed she can take the omeprazole twice a day    Follow-up in 4 weeks with glucose challenge    Paloma Rodriguez MD  11/7/2023  16:03 EST

## 2023-12-05 ENCOUNTER — ROUTINE PRENATAL (OUTPATIENT)
Dept: OBSTETRICS AND GYNECOLOGY | Age: 28
End: 2023-12-05
Payer: COMMERCIAL

## 2023-12-05 VITALS — WEIGHT: 254 LBS | DIASTOLIC BLOOD PRESSURE: 72 MMHG | BODY MASS INDEX: 43.6 KG/M2 | SYSTOLIC BLOOD PRESSURE: 126 MMHG

## 2023-12-05 DIAGNOSIS — Z13.0 SCREENING FOR IRON DEFICIENCY ANEMIA: ICD-10-CM

## 2023-12-05 DIAGNOSIS — Z13.1 SCREENING FOR DIABETES MELLITUS: ICD-10-CM

## 2023-12-05 DIAGNOSIS — Z13.89 SCREENING FOR BLOOD OR PROTEIN IN URINE: Primary | ICD-10-CM

## 2023-12-05 DIAGNOSIS — Z34.82 ENCOUNTER FOR SUPERVISION OF OTHER NORMAL PREGNANCY IN SECOND TRIMESTER: ICD-10-CM

## 2023-12-05 LAB
GLUCOSE UR STRIP-MCNC: NEGATIVE MG/DL
PROT UR STRIP-MCNC: NEGATIVE MG/DL

## 2023-12-05 NOTE — PROGRESS NOTES
Chief Complaint   Patient presents with    Routine Prenatal Visit     27w3d OB Check, 1 HR GTT     Desi Espinoza is doing well.  Normal movement.  No significant cramping, contractions    She doesn't really want to breastfeed, everyone in her family has had issues with milk coming in but she has pressure from her husbands family to breastfeed.    Glucose challenge today, CBC  Tdap and rhogam given today    Return for add growth to next visit and at 34 weeks please. weekly visit with BPP starting at 34 wks..    Paloma Rodriguez MD  12/5/2023  11:05 EST

## 2023-12-06 ENCOUNTER — PATIENT MESSAGE (OUTPATIENT)
Dept: OBSTETRICS AND GYNECOLOGY | Age: 28
End: 2023-12-06
Payer: COMMERCIAL

## 2023-12-06 DIAGNOSIS — O24.410 GDM (GESTATIONAL DIABETES MELLITUS), CLASS A1: Primary | ICD-10-CM

## 2023-12-06 LAB
BASOPHILS # BLD AUTO: 0 X10E3/UL (ref 0–0.2)
BASOPHILS NFR BLD AUTO: 0 %
EOSINOPHIL # BLD AUTO: 0.1 X10E3/UL (ref 0–0.4)
EOSINOPHIL NFR BLD AUTO: 1 %
ERYTHROCYTE [DISTWIDTH] IN BLOOD BY AUTOMATED COUNT: 11.8 % (ref 11.7–15.4)
GLUCOSE 1H P 50 G GLC PO SERPL-MCNC: 227 MG/DL (ref 70–139)
HCT VFR BLD AUTO: 40 % (ref 34–46.6)
HGB BLD-MCNC: 13 G/DL (ref 11.1–15.9)
IMM GRANULOCYTES # BLD AUTO: 0.1 X10E3/UL (ref 0–0.1)
IMM GRANULOCYTES NFR BLD AUTO: 1 %
LYMPHOCYTES # BLD AUTO: 1.4 X10E3/UL (ref 0.7–3.1)
LYMPHOCYTES NFR BLD AUTO: 13 %
MCH RBC QN AUTO: 29.4 PG (ref 26.6–33)
MCHC RBC AUTO-ENTMCNC: 32.5 G/DL (ref 31.5–35.7)
MCV RBC AUTO: 91 FL (ref 79–97)
MONOCYTES # BLD AUTO: 0.7 X10E3/UL (ref 0.1–0.9)
MONOCYTES NFR BLD AUTO: 7 %
NEUTROPHILS # BLD AUTO: 8.7 X10E3/UL (ref 1.4–7)
NEUTROPHILS NFR BLD AUTO: 78 %
PLATELET # BLD AUTO: 219 X10E3/UL (ref 150–450)
RBC # BLD AUTO: 4.42 X10E6/UL (ref 3.77–5.28)
RPR SER QL: NON REACTIVE
WBC # BLD AUTO: 10.9 X10E3/UL (ref 3.4–10.8)

## 2023-12-06 RX ORDER — GLUCOSAMINE HCL/CHONDROITIN SU 500-400 MG
1 CAPSULE ORAL 4 TIMES DAILY
Qty: 200 EACH | Refills: 11 | Status: SHIPPED | OUTPATIENT
Start: 2023-12-06

## 2023-12-06 RX ORDER — BLOOD-GLUCOSE METER
KIT MISCELLANEOUS
Qty: 1 EACH | Refills: 0 | Status: SHIPPED | OUTPATIENT
Start: 2023-12-06

## 2023-12-06 RX ORDER — LANCETS 30 GAUGE
1 EACH MISCELLANEOUS 4 TIMES DAILY
Qty: 200 EACH | Refills: 11 | Status: SHIPPED | OUTPATIENT
Start: 2023-12-06

## 2023-12-07 ENCOUNTER — TELEPHONE (OUTPATIENT)
Dept: OBSTETRICS AND GYNECOLOGY | Age: 28
End: 2023-12-07
Payer: COMMERCIAL

## 2023-12-07 NOTE — TELEPHONE ENCOUNTER
"Called and answered her questions. Possibly has weak D antibody, reviewed role of rhogam.  To start checking glucoses and she is agreeable.      Regarding: FW: Blood Type and Glucose Requests  Contact: [phone]  Please see message below, Dr. Rodriguez is out of the office and patient would like a call back to discuss.      ----- Message -----  From: Anali Robertson  Sent: 12/7/2023   8:11 AM EST  To: Irena Alcala CMA  Subject: Blood Type and Glucose Requests                  ----- Message from Anali Robertson sent at 12/7/2023  8:11 AM EST -----       ----- Message from Desi Espinoza to Paloma Rodriguez MD sent at 12/6/2023 10:24 PM -----   I have several questions and concerns about my visit yesterday. After talking with my parents, they were confident I had B+ blood at birth. That is what is showing in my baby book. Was there a mistake made at birth or a mistake made now and does this shot I took affect me or my baby? I'm really worried about this.     I also had questions about my next steps with the glucose results. The person who called me today was not helpful and somewhat rude. But she told me completely different information than what you have told me with my reflux so far and I am very confused on what to do. I also don't understand how everything can be so \"great and fine\" but my results be this bad, with no other red flags. I just don't understand. I would like to talk to you directly when you have a chance please.     Thank you!  Desi"

## 2023-12-07 NOTE — TELEPHONE ENCOUNTER
"From: Desi Espinoza  To: Paloma Rodriguez  Sent: 12/6/2023 10:24 PM EST  Subject: Blood Type and Glucose Requests    I have several questions and concerns about my visit yesterday. After talking with my parents, they were confident I had B+ blood at birth. That is what is showing in my baby book. Was there a mistake made at birth or a mistake made now and does this shot I took affect me or my baby? I'm really worried about this.     I also had questions about my next steps with the glucose results. The person who called me today was not helpful and somewhat rude. But she told me completely different information than what you have told me with my reflux so far and I am very confused on what to do. I also don't understand how everything can be so \"great and fine\" but my results be this bad, with no other red flags. I just don't understand. I would like to talk to you directly when you have a chance please.     Thank you!  Desi"

## 2023-12-08 NOTE — TELEPHONE ENCOUNTER
LARY khan was referred to diabetic education but does not want to schedule.  I wasn't sure if she mentioned this when you spoke with her yesterday.    Their  sent us a note - Spoke with pt today, she cannot miss work, already using meter, did not wish to schedule in person nor telephone appt.  We are sending her some materials today via regular mail.

## 2023-12-19 ENCOUNTER — TELEPHONE (OUTPATIENT)
Facility: HOSPITAL | Age: 28
End: 2023-12-19
Payer: COMMERCIAL

## 2023-12-19 ENCOUNTER — ROUTINE PRENATAL (OUTPATIENT)
Dept: OBSTETRICS AND GYNECOLOGY | Age: 28
End: 2023-12-19
Payer: COMMERCIAL

## 2023-12-19 VITALS — BODY MASS INDEX: 43.5 KG/M2 | SYSTOLIC BLOOD PRESSURE: 128 MMHG | WEIGHT: 253.4 LBS | DIASTOLIC BLOOD PRESSURE: 76 MMHG

## 2023-12-19 DIAGNOSIS — O24.414 INSULIN CONTROLLED GESTATIONAL DIABETES MELLITUS (GDM) IN THIRD TRIMESTER: ICD-10-CM

## 2023-12-19 DIAGNOSIS — Z34.83 ENCOUNTER FOR SUPERVISION OF OTHER NORMAL PREGNANCY IN THIRD TRIMESTER: ICD-10-CM

## 2023-12-19 DIAGNOSIS — Z13.89 SCREENING FOR BLOOD OR PROTEIN IN URINE: Primary | ICD-10-CM

## 2023-12-19 LAB
GLUCOSE UR STRIP-MCNC: NEGATIVE MG/DL
PROT UR STRIP-MCNC: NEGATIVE MG/DL

## 2023-12-19 RX ORDER — FLURBIPROFEN SODIUM 0.3 MG/ML
SOLUTION/ DROPS OPHTHALMIC
Qty: 200 EACH | Refills: 11 | Status: SHIPPED | OUTPATIENT
Start: 2023-12-19

## 2023-12-19 NOTE — TELEPHONE ENCOUNTER
----- Message from Paloma Rodriguez MD sent at 12/19/2023 12:41 PM EST -----  Can you please follow up with her pharmacy and make sure can get insulin

## 2023-12-19 NOTE — PROGRESS NOTES
Chief Complaint   Patient presents with    Routine Prenatal Visit     29w3d OB Check      Desi Espinoza is doing well. Normal movement. Her glucoses are all elevated, she has had some PP values elisa breakfast after eating oatmeal packets. She has made some diet changes and notes some improvement, but she does have significantly elevated fastings as well.     bpm    Diagnoses and all orders for this visit:    1. Screening for blood or protein in urine (Primary)  -     POC Urinalysis Dipstick    2. Encounter for supervision of other normal pregnancy in third trimester    3. Insulin controlled gestational diabetes mellitus (GDM) in third trimester    Other orders  -     insulin NPH (humuLIN N,novoLIN N) 100 UNIT/ML injection; Inject subcutaneously 52 units in the morning and 20 units at bedtime.  Dispense: 8 each; Refill: 12  -     insulin aspart (novoLOG FLEXPEN) 100 UNIT/ML solution pen-injector sc pen; Inject subcutaneously 25 units with breakfast and 20 units with dinner.  Dispense: 16 mL; Refill: 11  -     Insulin Pen Needle (B-D UF III MINI PEN NEEDLES) 31G X 5 MM misc; Use four times daily to inject insulin.  Dispense: 200 each; Refill: 11  -     Glucagon 0.5 MG/0.1ML solution auto-injector; Inject 0.1 mL under the skin into the appropriate area as directed 1 (One) Time As Needed (severe hypoglycemia) for up to 1 dose.  Dispense: 0.1 mL; Refill: 1        Plan to start insulin, NPH 52 AM and 20 PM  Aspart 25 breakfast and 20 dinner    FU 1 wk, at 32 weeks weekly visit with  testing    Paloma Rodriguez MD  2023  12:40 EST

## 2023-12-26 ENCOUNTER — ROUTINE PRENATAL (OUTPATIENT)
Dept: OBSTETRICS AND GYNECOLOGY | Age: 28
End: 2023-12-26
Payer: COMMERCIAL

## 2023-12-26 VITALS — DIASTOLIC BLOOD PRESSURE: 76 MMHG | BODY MASS INDEX: 43.22 KG/M2 | SYSTOLIC BLOOD PRESSURE: 132 MMHG | WEIGHT: 251.8 LBS

## 2023-12-26 DIAGNOSIS — Z34.83 ENCOUNTER FOR SUPERVISION OF OTHER NORMAL PREGNANCY IN THIRD TRIMESTER: ICD-10-CM

## 2023-12-26 DIAGNOSIS — Z13.89 SCREENING FOR BLOOD OR PROTEIN IN URINE: Primary | ICD-10-CM

## 2023-12-26 LAB
GLUCOSE UR STRIP-MCNC: NEGATIVE MG/DL
PROT UR STRIP-MCNC: NEGATIVE MG/DL

## 2023-12-26 PROCEDURE — 0502F SUBSEQUENT PRENATAL CARE: CPT | Performed by: OBSTETRICS & GYNECOLOGY

## 2023-12-26 NOTE — PROGRESS NOTES
Chief Complaint   Patient presents with    Routine Prenatal Visit     30w3d OB Check with US     Desi Espinoza is doing well. She took the insulin for two days and felt low at glucose of 76. Her fastings and PP values are much improved and she has made further diet changes  Plan to decrease her long-acting given these symptomatic lows, changing to 30 units NPH morning and 16 at bedtime.  US normal today, normal TONA and growth. Formal BPP not performed but she does have normal movement and breathing.    Send in glucose logs next week, unable to come in  Reviewed classes, RSV vaccine    Paloma Rodriguez MD  12/26/2023  11:56 EST

## 2023-12-27 ENCOUNTER — PATIENT MESSAGE (OUTPATIENT)
Dept: OBSTETRICS AND GYNECOLOGY | Age: 28
End: 2023-12-27
Payer: COMMERCIAL

## 2023-12-28 ENCOUNTER — TELEPHONE (OUTPATIENT)
Dept: OBSTETRICS AND GYNECOLOGY | Age: 28
End: 2023-12-28
Payer: COMMERCIAL

## 2023-12-28 NOTE — TELEPHONE ENCOUNTER
From: Desi Espinoza  To: Paloma Jennifer  Sent: 12/27/2023 11:04 PM EST  Subject: Blood Sugar Lows    I have had 2 bottom out episodes today. Just wanted you to see these numbers, I'll try the new dosages again tomorrow to see if I adjust any better.     I started with the overnight dose.   Fasting this morning was 85. Took the long term of 30 around 9:30.  Ate breakfast, had oatmeal and milk. I took the short term after eating. It was 133 an hour later.  First low episode was at 2pm. It was 66 when I tested it.   Ate a little Sheila to raise it (my legs felt numb, I had been walking outside when it dropped) then I ate a frozen individual pizza and can of mountain dew. It was 123 an hour later.  Ate dinner around 7. Had a steak with loaded Mac and cheese for dinner. Also splurged on some Eduard candy. Took the short term after eating. It was 158 an hour later.  Around 10pm my tongue felt numb. I checked my sugar it was down to 51. Drank some apple juice and that brought it up to 89.     I still plan on doing the long term when I lay down. But this 51 scared me. Do I need to take the short terms before I eat instead of after? After was the pharmacist recommendation. How long after the morning long term do I need to eat lunch? I need to time that better so I'm not bottoming out mid day.    Thank you for the help!

## 2023-12-28 NOTE — TELEPHONE ENCOUNTER
Pt is calling regarding her [address] message she wants to make sure that you see it and can give her a call.

## 2024-01-02 ENCOUNTER — PATIENT MESSAGE (OUTPATIENT)
Dept: OBSTETRICS AND GYNECOLOGY | Age: 29
End: 2024-01-02
Payer: COMMERCIAL

## 2024-01-02 RX ORDER — LANCETS 30 GAUGE
EACH MISCELLANEOUS
Qty: 300 EACH | Refills: 11 | Status: SHIPPED | OUTPATIENT
Start: 2024-01-02

## 2024-01-02 RX ORDER — GLUCOSAMINE HCL/CHONDROITIN SU 500-400 MG
CAPSULE ORAL
Qty: 300 EACH | Refills: 11 | Status: SHIPPED | OUTPATIENT
Start: 2024-01-02

## 2024-01-02 NOTE — TELEPHONE ENCOUNTER
From: Desi Espinoza  To: Paloma Rodriguez  Sent: 1/2/2024 7:41 AM EST  Subject: Sugar Log    Here is my sugar log for the week. Still had a few bottom out spells, having to eat a ton to keep it up and that makes me not as hungry later in the day. The highlighted numbers show where I skipped am insulin dose. Either at dinner or overnight. Doing my best to take it as long as it isn't too low. Dinner schedule should get better with work routine starting back up.    Anyway you can rewrite my prescription for the lancets and test strips? While I have plenty refills (11 I think) it won't let me refill it because it says that should last 50 days. With the low numbers I have been testing way more than 4 times a day so I am already out. Thank you!

## 2024-01-09 ENCOUNTER — ROUTINE PRENATAL (OUTPATIENT)
Dept: OBSTETRICS AND GYNECOLOGY | Age: 29
End: 2024-01-09
Payer: COMMERCIAL

## 2024-01-09 VITALS — DIASTOLIC BLOOD PRESSURE: 72 MMHG | SYSTOLIC BLOOD PRESSURE: 134 MMHG | BODY MASS INDEX: 45.18 KG/M2 | WEIGHT: 263.2 LBS

## 2024-01-09 DIAGNOSIS — O24.414 INSULIN CONTROLLED GESTATIONAL DIABETES MELLITUS (GDM) IN THIRD TRIMESTER: ICD-10-CM

## 2024-01-09 DIAGNOSIS — Z13.89 SCREENING FOR BLOOD OR PROTEIN IN URINE: Primary | ICD-10-CM

## 2024-01-09 DIAGNOSIS — Z34.01 ENCOUNTER FOR SUPERVISION OF NORMAL FIRST PREGNANCY IN FIRST TRIMESTER: ICD-10-CM

## 2024-01-09 DIAGNOSIS — Z34.83 ENCOUNTER FOR SUPERVISION OF OTHER NORMAL PREGNANCY IN THIRD TRIMESTER: ICD-10-CM

## 2024-01-09 LAB
GLUCOSE UR STRIP-MCNC: NEGATIVE MG/DL
PROT UR STRIP-MCNC: ABNORMAL MG/DL

## 2024-01-09 PROCEDURE — 0502F SUBSEQUENT PRENATAL CARE: CPT | Performed by: OBSTETRICS & GYNECOLOGY

## 2024-01-09 NOTE — PROGRESS NOTES
Chief Complaint   Patient presents with    Routine Prenatal Visit     32w3d OB Check      Desi Espinoza is doing well, her glucoses have been better controlled but she has been a little low after dinner occasionally.  Her fastings are a bit elevated, her breakfast is always elevated    /72   Wt 119 kg (263 lb 3.2 oz)   LMP 2023 (Exact Date)   BMI 45.18 kg/m²   Well, no distress  Regular, nonlabored breathing  Fundus is soft and nontender, limited ultrasound with breech presentation noted  NST is reactive today    A/P  Diagnoses and all orders for this visit:    1. Screening for blood or protein in urine (Primary)  -     POC Urinalysis Dipstick    2. Encounter for supervision of other normal pregnancy in third trimester    3. Encounter for supervision of normal first pregnancy in first trimester    4. Insulin controlled gestational diabetes mellitus (GDM) in third trimester      Insulin regimen adjusted today- 8N/24/10/20N  NST completed for  testing and reactive, no decelerations  FU 1 wk    Paloma Rodriguez MD  2024  16:13 EST

## 2024-01-11 ENCOUNTER — TELEPHONE (OUTPATIENT)
Dept: OBSTETRICS AND GYNECOLOGY | Age: 29
End: 2024-01-11
Payer: COMMERCIAL

## 2024-01-11 ENCOUNTER — CLINICAL SUPPORT (OUTPATIENT)
Dept: OBSTETRICS AND GYNECOLOGY | Age: 29
End: 2024-01-11
Payer: COMMERCIAL

## 2024-01-11 ENCOUNTER — ROUTINE PRENATAL (OUTPATIENT)
Dept: OBSTETRICS AND GYNECOLOGY | Age: 29
End: 2024-01-11
Payer: COMMERCIAL

## 2024-01-11 ENCOUNTER — HOSPITAL ENCOUNTER (INPATIENT)
Facility: HOSPITAL | Age: 29
LOS: 10 days | Discharge: HOME | End: 2024-01-21
Attending: OBSTETRICS & GYNECOLOGY | Admitting: OBSTETRICS & GYNECOLOGY
Payer: COMMERCIAL

## 2024-01-11 VITALS — SYSTOLIC BLOOD PRESSURE: 140 MMHG | BODY MASS INDEX: 45.14 KG/M2 | WEIGHT: 263 LBS | DIASTOLIC BLOOD PRESSURE: 80 MMHG

## 2024-01-11 DIAGNOSIS — O24.414 INSULIN CONTROLLED GESTATIONAL DIABETES MELLITUS (GDM) IN THIRD TRIMESTER: Primary | ICD-10-CM

## 2024-01-11 DIAGNOSIS — O14.93 PRE-ECLAMPSIA IN THIRD TRIMESTER: ICD-10-CM

## 2024-01-11 DIAGNOSIS — R03.0 ELEVATED BLOOD PRESSURE READING: ICD-10-CM

## 2024-01-11 DIAGNOSIS — O24.410 GDM (GESTATIONAL DIABETES MELLITUS), CLASS A1: ICD-10-CM

## 2024-01-11 DIAGNOSIS — Z13.89 SCREENING FOR BLOOD OR PROTEIN IN URINE: Primary | ICD-10-CM

## 2024-01-11 DIAGNOSIS — Z3A.32 32 WEEKS GESTATION OF PREGNANCY: Primary | ICD-10-CM

## 2024-01-11 DIAGNOSIS — Z67.91 RH NEGATIVE STATUS DURING PREGNANCY IN FIRST TRIMESTER: ICD-10-CM

## 2024-01-11 DIAGNOSIS — R82.71 GROUP B STREPTOCOCCAL BACTERIURIA: ICD-10-CM

## 2024-01-11 DIAGNOSIS — O24.414 INSULIN CONTROLLED GESTATIONAL DIABETES MELLITUS (GDM) IN THIRD TRIMESTER: ICD-10-CM

## 2024-01-11 DIAGNOSIS — O26.891 RH NEGATIVE STATUS DURING PREGNANCY IN FIRST TRIMESTER: ICD-10-CM

## 2024-01-11 PROBLEM — Z34.90 PREGNANCY: Status: ACTIVE | Noted: 2024-01-11

## 2024-01-11 PROBLEM — O14.90 PREECLAMPSIA: Status: ACTIVE | Noted: 2024-01-11

## 2024-01-11 LAB
ABO GROUP BLD: NORMAL
ALBUMIN SERPL-MCNC: 3.2 G/DL (ref 3.5–5.2)
ALBUMIN/GLOB SERPL: 1.2 G/DL
ALP SERPL-CCNC: 91 U/L (ref 39–117)
ALT SERPL W P-5'-P-CCNC: 19 U/L (ref 1–33)
ANION GAP SERPL CALCULATED.3IONS-SCNC: 12 MMOL/L (ref 5–15)
AST SERPL-CCNC: 19 U/L (ref 1–32)
BACTERIA UR QL AUTO: ABNORMAL /HPF
BASOPHILS # BLD AUTO: 0.02 10*3/MM3 (ref 0–0.2)
BASOPHILS NFR BLD AUTO: 0.2 % (ref 0–1.5)
BILIRUB BLD-MCNC: NEGATIVE MG/DL
BILIRUB SERPL-MCNC: <0.2 MG/DL (ref 0–1.2)
BILIRUB UR QL STRIP: NEGATIVE
BLD GP AB SCN SERPL QL: POSITIVE
BUN SERPL-MCNC: 10 MG/DL (ref 6–20)
BUN/CREAT SERPL: 18.9 (ref 7–25)
CALCIUM SPEC-SCNC: 8.5 MG/DL (ref 8.6–10.5)
CHLORIDE SERPL-SCNC: 102 MMOL/L (ref 98–107)
CLARITY UR: ABNORMAL
CO2 SERPL-SCNC: 20 MMOL/L (ref 22–29)
COLOR UR: YELLOW
CREAT SERPL-MCNC: 0.53 MG/DL (ref 0.57–1)
CREAT UR-MCNC: 50.2 MG/DL
DEPRECATED RDW RBC AUTO: 38.5 FL (ref 37–54)
EGFRCR SERPLBLD CKD-EPI 2021: 127.8 ML/MIN/1.73
EOSINOPHIL # BLD AUTO: 0.06 10*3/MM3 (ref 0–0.4)
EOSINOPHIL NFR BLD AUTO: 0.5 % (ref 0.3–6.2)
ERYTHROCYTE [DISTWIDTH] IN BLOOD BY AUTOMATED COUNT: 12.1 % (ref 12.3–15.4)
GLOBULIN UR ELPH-MCNC: 2.6 GM/DL
GLUCOSE BLDC GLUCOMTR-MCNC: 102 MG/DL (ref 70–130)
GLUCOSE BLDC GLUCOMTR-MCNC: 94 MG/DL (ref 70–130)
GLUCOSE SERPL-MCNC: 80 MG/DL (ref 65–99)
GLUCOSE UR STRIP-MCNC: NEGATIVE MG/DL
GLUCOSE UR STRIP-MCNC: NEGATIVE MG/DL
HCT VFR BLD AUTO: 38.1 % (ref 34–46.6)
HGB BLD-MCNC: 12.4 G/DL (ref 12–15.9)
HGB UR QL STRIP.AUTO: NEGATIVE
HYALINE CASTS UR QL AUTO: ABNORMAL /LPF
IMM GRANULOCYTES # BLD AUTO: 0.06 10*3/MM3 (ref 0–0.05)
IMM GRANULOCYTES NFR BLD AUTO: 0.5 % (ref 0–0.5)
KETONES UR QL STRIP: NEGATIVE
KETONES UR QL: NEGATIVE
LEUKOCYTE EST, POC: ABNORMAL
LEUKOCYTE ESTERASE UR QL STRIP.AUTO: NEGATIVE
LYMPHOCYTES # BLD AUTO: 3.31 10*3/MM3 (ref 0.7–3.1)
LYMPHOCYTES NFR BLD AUTO: 25.8 % (ref 19.6–45.3)
MCH RBC QN AUTO: 28.3 PG (ref 26.6–33)
MCHC RBC AUTO-ENTMCNC: 32.5 G/DL (ref 31.5–35.7)
MCV RBC AUTO: 87 FL (ref 79–97)
MONOCYTES # BLD AUTO: 0.95 10*3/MM3 (ref 0.1–0.9)
MONOCYTES NFR BLD AUTO: 7.4 % (ref 5–12)
NEUTROPHILS NFR BLD AUTO: 65.6 % (ref 42.7–76)
NEUTROPHILS NFR BLD AUTO: 8.44 10*3/MM3 (ref 1.7–7)
NITRITE UR QL STRIP: NEGATIVE
NITRITE UR-MCNC: NEGATIVE MG/ML
NRBC BLD AUTO-RTO: 0.1 /100 WBC (ref 0–0.2)
PH UR STRIP.AUTO: 6 [PH] (ref 5–8)
PH UR: 6 [PH] (ref 5–8)
PLATELET # BLD AUTO: 231 10*3/MM3 (ref 140–450)
PMV BLD AUTO: 11.1 FL (ref 6–12)
POTASSIUM SERPL-SCNC: 3.9 MMOL/L (ref 3.5–5.2)
PROT ?TM UR-MCNC: 43.2 MG/DL
PROT SERPL-MCNC: 5.8 G/DL (ref 6–8.5)
PROT UR QL STRIP: ABNORMAL
PROT UR STRIP-MCNC: ABNORMAL MG/DL
PROT/CREAT UR: 860.6 MG/G CREA (ref 0–200)
RBC # BLD AUTO: 4.38 10*6/MM3 (ref 3.77–5.28)
RBC # UR STRIP: ABNORMAL /HPF
RBC # UR STRIP: NEGATIVE /UL
REF LAB TEST METHOD: ABNORMAL
RESIDUAL RHIG DETECTED: NORMAL
RH BLD: NEGATIVE
SODIUM SERPL-SCNC: 134 MMOL/L (ref 136–145)
SP GR UR STRIP: 1.01 (ref 1–1.03)
SP GR UR: 1.02 (ref 1–1.03)
SQUAMOUS #/AREA URNS HPF: ABNORMAL /HPF
T&S EXPIRATION DATE: NORMAL
UROBILINOGEN UR QL STRIP: ABNORMAL
UROBILINOGEN UR QL: ABNORMAL
WBC # UR STRIP: ABNORMAL /HPF
WBC NRBC COR # BLD AUTO: 12.84 10*3/MM3 (ref 3.4–10.8)

## 2024-01-11 PROCEDURE — 63710000001 INSULIN ISOPHANE HUMAN PER 5 UNITS: Performed by: OBSTETRICS & GYNECOLOGY

## 2024-01-11 PROCEDURE — 86870 RBC ANTIBODY IDENTIFICATION: CPT | Performed by: OBSTETRICS & GYNECOLOGY

## 2024-01-11 PROCEDURE — 84156 ASSAY OF PROTEIN URINE: CPT | Performed by: OBSTETRICS & GYNECOLOGY

## 2024-01-11 PROCEDURE — 63710000001 INSULIN REGULAR HUMAN PER 5 UNITS: Performed by: OBSTETRICS & GYNECOLOGY

## 2024-01-11 PROCEDURE — 85025 COMPLETE CBC W/AUTO DIFF WBC: CPT | Performed by: OBSTETRICS & GYNECOLOGY

## 2024-01-11 PROCEDURE — 80053 COMPREHEN METABOLIC PANEL: CPT | Performed by: OBSTETRICS & GYNECOLOGY

## 2024-01-11 PROCEDURE — 86900 BLOOD TYPING SEROLOGIC ABO: CPT | Performed by: OBSTETRICS & GYNECOLOGY

## 2024-01-11 PROCEDURE — 87086 URINE CULTURE/COLONY COUNT: CPT | Performed by: OBSTETRICS & GYNECOLOGY

## 2024-01-11 PROCEDURE — 82570 ASSAY OF URINE CREATININE: CPT | Performed by: OBSTETRICS & GYNECOLOGY

## 2024-01-11 PROCEDURE — 59025 FETAL NON-STRESS TEST: CPT

## 2024-01-11 PROCEDURE — 59025 FETAL NON-STRESS TEST: CPT | Performed by: OBSTETRICS & GYNECOLOGY

## 2024-01-11 PROCEDURE — 86901 BLOOD TYPING SEROLOGIC RH(D): CPT | Performed by: OBSTETRICS & GYNECOLOGY

## 2024-01-11 PROCEDURE — 81001 URINALYSIS AUTO W/SCOPE: CPT | Performed by: OBSTETRICS & GYNECOLOGY

## 2024-01-11 PROCEDURE — 86850 RBC ANTIBODY SCREEN: CPT | Performed by: OBSTETRICS & GYNECOLOGY

## 2024-01-11 PROCEDURE — 82948 REAGENT STRIP/BLOOD GLUCOSE: CPT

## 2024-01-11 RX ORDER — HUMAN INSULIN 100 [IU]/ML
INJECTION, SUSPENSION SUBCUTANEOUS
COMMUNITY
Start: 2023-12-22 | End: 2024-01-21 | Stop reason: HOSPADM

## 2024-01-11 RX ORDER — GLUCAGON 1 MG/ML
1 KIT INJECTION
Status: DISCONTINUED | OUTPATIENT
Start: 2024-01-11 | End: 2024-01-18

## 2024-01-11 RX ORDER — NICOTINE POLACRILEX 4 MG
15 LOZENGE BUCCAL
Status: DISCONTINUED | OUTPATIENT
Start: 2024-01-11 | End: 2024-01-18

## 2024-01-11 RX ORDER — DEXTROSE MONOHYDRATE 25 G/50ML
25 INJECTION, SOLUTION INTRAVENOUS
Status: DISCONTINUED | OUTPATIENT
Start: 2024-01-11 | End: 2024-01-18

## 2024-01-11 RX ORDER — BLOOD-GLUCOSE METER
EACH MISCELLANEOUS
COMMUNITY
Start: 2023-12-06 | End: 2024-01-30

## 2024-01-11 RX ADMIN — INSULIN HUMAN 8 UNITS: 100 INJECTION, SOLUTION PARENTERAL at 19:38

## 2024-01-11 RX ADMIN — INSULIN HUMAN 20 UNITS: 100 INJECTION, SUSPENSION SUBCUTANEOUS at 21:33

## 2024-01-11 NOTE — PROGRESS NOTES
Chief Complaint   Patient presents with    Routine Prenatal Visit     CC: Ob check, 32w5d, BP check       HPI:  Pt was working today and had noticed some swelling mainly in her feet and ankles she reports this started last night  Had the school nurse check her bp and it was 172/98  Denies HA or right upper quad pain  +FM, no contractions or lof    ROS:  No fever or chills, no nausea or vomiting, no contractions, no leg pain,  no leaking fluid, no bleeding, no headache, no dysuria  All other systems reviewed and negative    Exam:  See flow sheet  General:  Alert and oriented and no distress mild non pitting edema noted to bilateral LE  Neck: no lymphadenopathy or thyromegaly  Lungs: non - labored breathing  Abd:  See flow sheet, fundus nontender  Ext: see flow sheet, non-tender bilateral , no lesions  Neuro: grossly normal    Assessment:/ PLAN:  28 y.o.  at 32w5d    Diagnoses and all orders for this visit:    1. 32 weeks gestation of pregnancy (Primary)    2. Elevated blood pressure reading    3. Group B streptococcal bacteriuria    4. Rh negative status during pregnancy in first trimester    5. Insulin controlled gestational diabetes mellitus (GDM) in third trimester    6. GDM (gestational diabetes mellitus), class A1      /82 repeat 140/80 today in office  UA POC Urinalysis Dipstick (2024 15:32)   Discussed findings with Dr Rodriguez, sent to L&D for labs and serial BP checks Pre-e s/s reviewed she verbalized understanding and is en route now     Follow up in 1 week    ANGI Zuleta  2024  16:03 EST

## 2024-01-11 NOTE — TELEPHONE ENCOUNTER
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

## 2024-01-12 ENCOUNTER — APPOINTMENT (OUTPATIENT)
Dept: ULTRASOUND IMAGING | Facility: HOSPITAL | Age: 29
End: 2024-01-12
Payer: COMMERCIAL

## 2024-01-12 PROBLEM — O24.414 INSULIN CONTROLLED GESTATIONAL DIABETES MELLITUS (GDM) IN THIRD TRIMESTER: Status: ACTIVE | Noted: 2024-01-12

## 2024-01-12 LAB
ALBUMIN SERPL-MCNC: 3.1 G/DL (ref 3.5–5.2)
ALBUMIN/GLOB SERPL: 1.6 G/DL
ALP SERPL-CCNC: 89 U/L (ref 39–117)
ALT SERPL W P-5'-P-CCNC: 18 U/L (ref 1–33)
ANION GAP SERPL CALCULATED.3IONS-SCNC: 10 MMOL/L (ref 5–15)
AST SERPL-CCNC: 17 U/L (ref 1–32)
BACTERIA SPEC AEROBE CULT: NORMAL
BILIRUB SERPL-MCNC: <0.2 MG/DL (ref 0–1.2)
BUN SERPL-MCNC: 7 MG/DL (ref 6–20)
BUN/CREAT SERPL: 12.7 (ref 7–25)
CALCIUM SPEC-SCNC: 8.3 MG/DL (ref 8.6–10.5)
CHLORIDE SERPL-SCNC: 103 MMOL/L (ref 98–107)
CO2 SERPL-SCNC: 21 MMOL/L (ref 22–29)
COLLECT DURATION TIME UR: 24 HRS
CREAT SERPL-MCNC: 0.55 MG/DL (ref 0.57–1)
DEPRECATED RDW RBC AUTO: 37.1 FL (ref 37–54)
EGFRCR SERPLBLD CKD-EPI 2021: 126.6 ML/MIN/1.73
ERYTHROCYTE [DISTWIDTH] IN BLOOD BY AUTOMATED COUNT: 11.9 % (ref 12.3–15.4)
GLOBULIN UR ELPH-MCNC: 2 GM/DL
GLUCOSE BLDC GLUCOMTR-MCNC: 111 MG/DL (ref 70–130)
GLUCOSE BLDC GLUCOMTR-MCNC: 111 MG/DL (ref 70–130)
GLUCOSE BLDC GLUCOMTR-MCNC: 166 MG/DL (ref 70–130)
GLUCOSE BLDC GLUCOMTR-MCNC: 176 MG/DL (ref 70–130)
GLUCOSE BLDC GLUCOMTR-MCNC: 75 MG/DL (ref 70–130)
GLUCOSE BLDC GLUCOMTR-MCNC: 87 MG/DL (ref 70–130)
GLUCOSE SERPL-MCNC: 75 MG/DL (ref 65–99)
HCT VFR BLD AUTO: 36.5 % (ref 34–46.6)
HGB BLD-MCNC: 12.4 G/DL (ref 12–15.9)
MCH RBC QN AUTO: 29.2 PG (ref 26.6–33)
MCHC RBC AUTO-ENTMCNC: 34 G/DL (ref 31.5–35.7)
MCV RBC AUTO: 85.9 FL (ref 79–97)
PLATELET # BLD AUTO: 217 10*3/MM3 (ref 140–450)
PMV BLD AUTO: 11.7 FL (ref 6–12)
POTASSIUM SERPL-SCNC: 3.9 MMOL/L (ref 3.5–5.2)
PROT 24H UR-MRATE: 1274 MG/24HOURS (ref 0–150)
PROT SERPL-MCNC: 5.1 G/DL (ref 6–8.5)
RBC # BLD AUTO: 4.25 10*6/MM3 (ref 3.77–5.28)
SODIUM SERPL-SCNC: 134 MMOL/L (ref 136–145)
SPECIMEN VOL 24H UR: 2800 ML
WBC NRBC COR # BLD AUTO: 9.97 10*3/MM3 (ref 3.4–10.8)

## 2024-01-12 PROCEDURE — 99232 SBSQ HOSP IP/OBS MODERATE 35: CPT | Performed by: OBSTETRICS & GYNECOLOGY

## 2024-01-12 PROCEDURE — 85027 COMPLETE CBC AUTOMATED: CPT | Performed by: OBSTETRICS & GYNECOLOGY

## 2024-01-12 PROCEDURE — 63710000001 INSULIN ISOPHANE HUMAN PER 5 UNITS: Performed by: OBSTETRICS & GYNECOLOGY

## 2024-01-12 PROCEDURE — 76811 OB US DETAILED SNGL FETUS: CPT | Performed by: OBSTETRICS & GYNECOLOGY

## 2024-01-12 PROCEDURE — 82948 REAGENT STRIP/BLOOD GLUCOSE: CPT

## 2024-01-12 PROCEDURE — 59025 FETAL NON-STRESS TEST: CPT

## 2024-01-12 PROCEDURE — 76811 OB US DETAILED SNGL FETUS: CPT

## 2024-01-12 PROCEDURE — 76819 FETAL BIOPHYS PROFIL W/O NST: CPT | Performed by: OBSTETRICS & GYNECOLOGY

## 2024-01-12 PROCEDURE — 25010000002 BETAMETHASONE ACET & SOD PHOS PER 4 MG: Performed by: OBSTETRICS & GYNECOLOGY

## 2024-01-12 PROCEDURE — 59025 FETAL NON-STRESS TEST: CPT | Performed by: OBSTETRICS & GYNECOLOGY

## 2024-01-12 PROCEDURE — 80053 COMPREHEN METABOLIC PANEL: CPT | Performed by: OBSTETRICS & GYNECOLOGY

## 2024-01-12 PROCEDURE — 84156 ASSAY OF PROTEIN URINE: CPT | Performed by: OBSTETRICS & GYNECOLOGY

## 2024-01-12 PROCEDURE — G0378 HOSPITAL OBSERVATION PER HR: HCPCS

## 2024-01-12 PROCEDURE — 81050 URINALYSIS VOLUME MEASURE: CPT | Performed by: OBSTETRICS & GYNECOLOGY

## 2024-01-12 PROCEDURE — 63710000001 INSULIN REGULAR HUMAN PER 5 UNITS: Performed by: OBSTETRICS & GYNECOLOGY

## 2024-01-12 PROCEDURE — 76819 FETAL BIOPHYS PROFIL W/O NST: CPT

## 2024-01-12 RX ORDER — SODIUM CHLORIDE 0.9 % (FLUSH) 0.9 %
10 SYRINGE (ML) INJECTION EVERY 12 HOURS SCHEDULED
Status: DISCONTINUED | OUTPATIENT
Start: 2024-01-12 | End: 2024-01-18

## 2024-01-12 RX ORDER — BETAMETHASONE SODIUM PHOSPHATE AND BETAMETHASONE ACETATE 3; 3 MG/ML; MG/ML
12 INJECTION, SUSPENSION INTRA-ARTICULAR; INTRALESIONAL; INTRAMUSCULAR; SOFT TISSUE EVERY 24 HOURS
Status: COMPLETED | OUTPATIENT
Start: 2024-01-12 | End: 2024-01-13

## 2024-01-12 RX ADMIN — INSULIN HUMAN 8 UNITS: 100 INJECTION, SOLUTION PARENTERAL at 17:16

## 2024-01-12 RX ADMIN — INSULIN HUMAN 24 UNITS: 100 INJECTION, SOLUTION PARENTERAL at 07:45

## 2024-01-12 RX ADMIN — INSULIN HUMAN 8 UNITS: 100 INJECTION, SUSPENSION SUBCUTANEOUS at 06:56

## 2024-01-12 RX ADMIN — SODIUM CHLORIDE, PRESERVATIVE FREE 10 ML: 5 INJECTION INTRAVENOUS at 21:18

## 2024-01-12 RX ADMIN — INSULIN HUMAN 25 UNITS: 100 INJECTION, SUSPENSION SUBCUTANEOUS at 21:16

## 2024-01-12 RX ADMIN — BETAMETHASONE SODIUM PHOSPHATE AND BETAMETHASONE ACETATE 12 MG: 3; 3 INJECTION, SUSPENSION INTRA-ARTICULAR; INTRALESIONAL; INTRAMUSCULAR at 13:49

## 2024-01-12 NOTE — PROGRESS NOTES
"[facility]  Obstetric Progress Note    Chief Complaint   Patient presents with    Elevated Blood Pressure     Pt sent from office for bp check and labs       Subjective     Patient:    The patient feels well.  She has some LE edema but improved.  No headache, feels well.  Anxious to go home for baby shower tomorrow.       Review of Systems - ROS:  No fever or chills, no nausea or vomiting, no contractions, no leg pain, no LE edema, no leaking fluid, no bleeding, no headache, no dysuria        Objective     Vital Signs Range for the last 24 hours  Temp:  [97.5 °F (36.4 °C)-97.8 °F (36.6 °C)] 97.6 °F (36.4 °C)   Temp src: Oral   BP: (120-158)/(76-92) 158/89   Heart Rate:  [65-84] 76   Resp:  [16-18] 18   SpO2:  [98 %-100 %] 98 %       Device (Oxygen Therapy): room air   Weight:  [119 kg (263 lb)] 119 kg (263 lb)       Flowsheet Rows      Flowsheet Row First Filed Value   Admission Height 162.6 cm (64\") Documented at 01/11/2024 1646   Admission Weight 119 kg (263 lb) Documented at 01/11/2024 1818            Intake/Output last 24 hours:    No intake or output data in the 24 hours ending 01/12/24 1121    Intake/Output this shift:    No intake/output data recorded.    Physical Exam:  General: Patient is comfortable, well appearing, and in no acute distress   Heart CVS exam: normal rate and regular rhythm.   Lungs Chest: no tachypnea, retractions or cyanosis.     Abdomen Abdominal exam: gravid, non tender.   Extremities Exam of extremities: peripheral pulses normal, no pedal edema, no clubbing or cyanosis, pedal edema 1 +     Presentation: breech   Cervix: Exam by:     Dilation:     Effacement:     Station:           Fetal Heart Rate Assessment   Method: Fetal HR Assessment Method: external   Beats/min: Fetal HR (beats/min): 145   Baseline: Fetal HR Baseline: normal range   Varibility: Fetal HR Variability: moderate (amplitude range 6 to 25 bpm)   Accels: Fetal HR Accelerations: greater than/equal to 15 bpm, lasting at " least 15 seconds   Decels: Fetal HR Decelerations: absent   Tracing Category:       Uterine Assessment   Method: Method: external tocotransducer   Frequency (min): Contraction Frequency (Minutes): x1   Ctx Count in 10 min:     Duration:     Intensity: Contraction Intensity: no contractions   Intensity by IUPC:     Resting Tone: Uterine Resting Tone: soft by palpation   Resting Tone by IUPC:     [place] Units:       Lab Results (last 24 hours)       Procedure Component Value Units Date/Time    POC Glucose Once [[id number]]  (Normal) Collected: 01/12/24 1113    Specimen: Blood Updated: 01/12/24 1115     Glucose 87 mg/dL     Urine Culture - Urine, Urine, Clean Catch [[id number]] Collected: 01/11/24 1653    Specimen: Urine, Clean Catch Updated: 01/12/24 1022     Urine Culture <25,000 CFU/mL Normal Urogenital Lorena    Narrative:      Colonization of the urinary tract without infection is common. Treatment is discouraged unless the patient is symptomatic, pregnant, or undergoing an invasive urologic procedure.    POC Glucose Once [[id number]]  (Normal) Collected: 01/12/24 0917    Specimen: Blood Updated: 01/12/24 0918     Glucose 111 mg/dL     Comprehensive Metabolic Panel [[id number]]  (Abnormal) Collected: 01/12/24 0701    Specimen: Blood Updated: 01/12/24 0804     Glucose 75 mg/dL      BUN 7 mg/dL      Creatinine 0.55 mg/dL      Sodium 134 mmol/L      Potassium 3.9 mmol/L      Chloride 103 mmol/L      CO2 21.0 mmol/L      Calcium 8.3 mg/dL      Total Protein 5.1 g/dL      Albumin 3.1 g/dL      ALT (SGPT) 18 U/L      AST (SGOT) 17 U/L      Alkaline Phosphatase 89 U/L      Total Bilirubin <0.2 mg/dL      Globulin 2.0 gm/dL      A/G Ratio 1.6 g/dL      BUN/Creatinine Ratio 12.7     Anion Gap 10.0 mmol/L      eGFR 126.6 mL/min/1.73     Narrative:      GFR Normal >60  Chronic Kidney Disease <60  Kidney Failure <15      CBC (No Diff) [[id number]]  (Abnormal) Collected: 01/12/24 0701    Specimen: Blood Updated: 01/12/24 0749      WBC 9.97 10*3/mm3      RBC 4.25 10*6/mm3      Hemoglobin 12.4 g/dL      Hematocrit 36.5 %      MCV 85.9 fL      MCH 29.2 pg      MCHC 34.0 g/dL      RDW 11.9 %      RDW-SD 37.1 fl      MPV 11.7 fL      Platelets 217 10*3/mm3     POC Glucose Once [[id number]]  (Normal) Collected: 01/12/24 0654    Specimen: Blood Updated: 01/12/24 0655     Glucose 75 mg/dL     POC Glucose Once [[id number]]  (Normal) Collected: 01/11/24 2132    Specimen: Blood Updated: 01/11/24 2133     Glucose 94 mg/dL     POC Glucose Once [[id number]]  (Normal) Collected: 01/11/24 1936    Specimen: Blood Updated: 01/11/24 1937     Glucose 102 mg/dL     Protein / Creatinine Ratio, Urine - Urine, Clean Catch [[id number]]  (Abnormal) Collected: 01/11/24 1653    Specimen: Urine, Clean Catch Updated: 01/11/24 1740     Protein/Creatinine Ratio, Urine 860.6 mg/G Crea      Creatinine, Urine 50.2 mg/dL      Total Protein, Urine 43.2 mg/dL     Comprehensive Metabolic Panel [[id number]]  (Abnormal) Collected: 01/11/24 1653    Specimen: Blood Updated: 01/11/24 1732     Glucose 80 mg/dL      BUN 10 mg/dL      Creatinine 0.53 mg/dL      Sodium 134 mmol/L      Potassium 3.9 mmol/L      Chloride 102 mmol/L      CO2 20.0 mmol/L      Calcium 8.5 mg/dL      Total Protein 5.8 g/dL      Albumin 3.2 g/dL      ALT (SGPT) 19 U/L      AST (SGOT) 19 U/L      Alkaline Phosphatase 91 U/L      Total Bilirubin <0.2 mg/dL      Globulin 2.6 gm/dL      A/G Ratio 1.2 g/dL      BUN/Creatinine Ratio 18.9     Anion Gap 12.0 mmol/L      eGFR 127.8 mL/min/1.73     Narrative:      GFR Normal >60  Chronic Kidney Disease <60  Kidney Failure <15      Urinalysis With Culture If Indicated - Urine, Clean Catch [[id number]]  (Abnormal) Collected: 01/11/24 1653    Specimen: Urine, Clean Catch Updated: 01/11/24 1711     Color, UA Yellow     Appearance, UA Cloudy     pH, UA 6.0     Specific Gravity, UA 1.011     Glucose, UA Negative     Ketones, UA Negative     Bilirubin, UA Negative     Blood, UA  Negative     Protein, UA 30 mg/dL (1+)     Leuk Esterase, UA Negative     Nitrite, UA Negative     Urobilinogen, UA 0.2 E.U./dL    Narrative:      In absence of clinical symptoms, the presence of pyuria, bacteria, and/or nitrites on the urinalysis result does not correlate with infection.    Urinalysis, Microscopic Only - Urine, Clean Catch [[id number]]  (Abnormal) Collected: 01/11/24 1653    Specimen: Urine, Clean Catch Updated: 01/11/24 1711     RBC, UA 0-2 /HPF      WBC, UA 3-5 /HPF      Bacteria, UA 1+ /HPF      Squamous Epithelial Cells, UA 7-12 /HPF      Hyaline Casts, UA None Seen /LPF      Methodology Automated Microscopy    CBC & Differential [[id number]]  (Abnormal) Collected: 01/11/24 1653    Specimen: Blood Updated: 01/11/24 1711    Narrative:      The following orders were created for panel order CBC & Differential.  Procedure                               Abnormality         Status                     ---------                               -----------         ------                     CBC Auto Differential[[id number]]        Abnormal            Final result                 Please view results for these tests on the individual orders.    CBC Auto Differential [[id number]]  (Abnormal) Collected: 01/11/24 1653    Specimen: Blood Updated: 01/11/24 1711     WBC 12.84 10*3/mm3      RBC 4.38 10*6/mm3      Hemoglobin 12.4 g/dL      Hematocrit 38.1 %      MCV 87.0 fL      MCH 28.3 pg      MCHC 32.5 g/dL      RDW 12.1 %      RDW-SD 38.5 fl      MPV 11.1 fL      Platelets 231 10*3/mm3      Neutrophil % 65.6 %      Lymphocyte % 25.8 %      Monocyte % 7.4 %      Eosinophil % 0.5 %      Basophil % 0.2 %      Immature Grans % 0.5 %      Neutrophils, Absolute 8.44 10*3/mm3      Lymphocytes, Absolute 3.31 10*3/mm3      Monocytes, Absolute 0.95 10*3/mm3      Eosinophils, Absolute 0.06 10*3/mm3      Basophils, Absolute 0.02 10*3/mm3      Immature Grans, Absolute 0.06 10*3/mm3      nRBC 0.1 /100 WBC                  Assessment & Plan       Pregnancy    Rh negative status during pregnancy in first trimester    Group B streptococcal bacteriuria    Preeclampsia    Insulin controlled gestational diabetes mellitus (GDM) in third trimester    Breech presentation        Assessment:  1.  Intrauterine pregnancy at 32w6d weeks gestation with reactive fetal status.    Growth scan - 55 %    2.  pre-eclampsia mild, no severe features, 24 hour urine total protein pending.  BPs occasionally as high at 158/91    3.  Gestational diabetes on insulin- BS in range and well controlled thus far this admission    4. Breech presentation  5. GBS + , bacteriuria this pregnancy      Plan:  1. fetal and uterine monitoring  intermittent, 24 hour urine for  total protein, and  Consultation  2. Plan of care has been reviewed with patient and nursing  3.  Steroids not yet given  4.  Await MFM consult and total protein result , pt interested in outpatient management     Pt appears to have preeclampsia, no severe features, a few BP approach severe range.  Await MFM recommendations.       Mony Stock MD 2024 11:21 EST

## 2024-01-12 NOTE — NON STRESS TEST
Desi АЛЕКСАНДР Alexis, a  at 32w6d with an ARACELY of 3/2/2024, by Last Menstrual Period, was seen at [facility] for a nonstress test.    Chief Complaint   Patient presents with    Elevated Blood Pressure     Pt sent from office for bp check and labs       Patient Active Problem List   Diagnosis    Encounter for supervision of normal first pregnancy in first trimester    32 weeks gestation of pregnancy    Pre-hypertension    Rh negative status during pregnancy in first trimester    Group B streptococcal bacteriuria    Environmental allergies    Pregnancy    Preeclampsia       Start Time:   Stop Time:     Interpretation A  Nonstress Test Interpretation A: Reactive      REACTIVE

## 2024-01-12 NOTE — PLAN OF CARE
Goal Outcome Evaluation:           Progress: no change  Outcome Evaluation: VSS, no severe BP today but have been in the mild range. Reactive NST.  pt reports good fetal movement.  pt denies LOF, VB, or ctx. Pt denies HA, vision changes, RUQ pain, or any other Pre-E symptoms.  Blood glucose readings all WNL today.  Pt has approval per MFM and OB to take a leave of absence pass tomorrow for baby shower provided her BP remain out of the severe range, Pre-E labs WNL in the morning, and she has a reactive NST. Pt is very much looking forward to this.

## 2024-01-12 NOTE — NON STRESS TEST
Desi Espinoza, a  at 32w6d with an ARACELY of 3/2/2024, by Last Menstrual Period, was seen at [facility]TE[facility] UN[facility] for a nonstress test.    Chief Complaint   Patient presents with    Elevated Blood Pressure     Pt sent from office for bp check and labs       Patient Active Problem List   Diagnosis    Encounter for supervision of normal first pregnancy in first trimester    32 weeks gestation of pregnancy    Pre-hypertension    Rh negative status during pregnancy in first trimester    Group B streptococcal bacteriuria    Environmental allergies    Pregnancy    Preeclampsia       Start Time: 944  Stop Time:     Interpretation A  Nonstress Test Interpretation A: Reactive         NST was reviewed:    Patient Active Problem List   Diagnosis    Encounter for supervision of normal first pregnancy in first trimester    32 weeks gestation of pregnancy    Pre-hypertension    Rh negative status during pregnancy in first trimester    Group B streptococcal bacteriuria    Environmental allergies    Pregnancy    Preeclampsia    Insulin controlled gestational diabetes mellitus (GDM) in third trimester    Breech presentation         32w6d    Indication for preeclampsia    NST is reassuring, Cat 1 tracing.      Recommendation:  continue monitoring while inpatient    Reviewed by Mony Fuller MD    12:55 EST

## 2024-01-12 NOTE — H&P
[facility]  Obstetric History and Physical    Chief Complaint   Patient presents with    Elevated Blood Pressure     Pt sent from office for bp check and labs       Subjective     Patient is a 28 y.o. female  currently at 32w5d, who presents from the office due to elevated BP.  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  Her blood pressure was in the 170s systolic.  She denies any headache, visual changes.  No cramping, bleeding.  She notes normal fetal movement    Her prenatal care is complicated by gestational diabetes only moderately well-controlled with insulin, have been adjusting her regimen frequently due to episodes of hypoglycemia on weight-based dosing.  She also has group B strep bacteriuria.  She is Rh- and has received RhoGAM.  Baby has been breech on most recent ultrasound. Nst only at last office visit.    The following portions of the patients history were reviewed and updated as appropriate: current medications, allergies, past medical history, past surgical history, problem list, and ob hx  .       Prenatal Information:       Prenatal Labs for [facility] Patients  Lab Results   Component Value Date    HGB 12.4 2024    HEPBSAG Negative 2023    ABSCRN Negative 2023    [id number] Non Reactive 2023    HEPCVIRUSABY Non Reactive 2023       Prenatal Labs -- transcribed from paper records by Nurse  Lab Results   Component Value Date    ABO B 2023    RH Negative 2023    HEPBSAG Negative 2023    RPR Non Reactive 2023    [id number] Non Reactive 2023           Past OB History:       OB History    Para Term  AB Living   1 0 0 0 0 0   SAB IAB Ectopic Molar Multiple Live Births   0 0 0 0 0 0      # Outcome Date GA Lbr Ochoa/2nd Weight Sex Delivery Anes PTL Lv   1 Current                Past Medical History: Past Medical History:   Diagnosis Date     Gestational diabetes     Gestational hypertension     Preeclampsia 1/11/2024    Pregnancy 01/11/2024    Seasonal allergies       Past Surgical History Past Surgical History:   Procedure Laterality Date    NO PAST SURGERIES        Family History: Family History   Problem Relation Age of Onset    Diabetes Father     Diabetes Mother     Breast cancer Mother     Polycystic ovary syndrome Sister     Congenital heart disease Neg Hx       Social History:  reports that she has never smoked. She has never used smokeless tobacco.   reports that she does not currently use alcohol.   reports no history of drug use.           Objective       Vital Signs Range for the last 24 hours  Temperature: Temp:  [97.5 °F (36.4 °C)] 97.5 °F (36.4 °C)   Temp Source: Temp src: Oral   BP: BP: (120-149)/(80-92) 148/87   Pulse: Heart Rate:  [65-83] 68   Respirations: Resp:  [18] 18   SPO2: SpO2:  [100 %] 100 %   O2 Amount (l/min):     O2 Devices     Weight: Weight:  [119 kg (263 lb)] 119 kg (263 lb)     Physical Examination: General appearance - alert, well appearing, and in no distress  Chest - no tachypnea, retractions or cyanosis  Abdomen -fundus soft, nontender.  No right upper quadrant abdominal tenderness  Neurological - alert, oriented, normal speech, no focal findings or movement disorder noted, normal patellar DTR  Extremities -trace pedal edema bilateral shins  Skin - normal coloration and turgor, no rashes, no suspicious skin lesions noted    Presentation: Not yet evaluated   Cervix: Exam by:     Dilation:     Effacement:     Station:         Fetal Heart Rate Assessment   Method: Fetal HR Assessment Method: external   Beats/min: Fetal HR (beats/min): 140   Baseline: Fetal HR Baseline: normal range   Varibility: Fetal HR Variability: moderate (amplitude range 6 to 25 bpm)   Accels: Fetal HR Accelerations: greater than/equal to 15 bpm, lasting at least 15 seconds   Decels: Fetal HR Decelerations: absent   Tracing Category:        Uterine Assessment   Method: Method: palpation, external tocotransducer   Frequency (min):     Ctx Count in 10 min:     Duration:     Intensity: Contraction Intensity: no contractions   Intensity by IUPC:     Resting Tone: Uterine Resting Tone: soft by palpation   Resting Tone by IUPC:     S[place]ail Units:       Results from last 7 days   Lab Units 01/11/24  1653   WBC 10*3/mm3 12.84*   HEMOGLOBIN g/dL 12.4   HEMATOCRIT % 38.1   PLATELETS 10*3/mm3 231       Assessment & Plan       Pregnancy    Rh negative status during pregnancy in first trimester    Group B streptococcal bacteriuria    Preeclampsia        Assessment:  1.  Intrauterine pregnancy at 32w5d weeks gestation with reactive fetal status.    2.  Preeclampsia.  Insulin controlled gestational diabetes  3.  Obstetrical history benign  4.  GBS status: Positive bacteriuria    Plan:  1.  She has mildly elevated blood pressures and significant protein creatinine ratio 800 mg.  She is asymptomatic and otherwise her labs are within normal limits.  Given this quick onset of hypertension and proteinuria, recommended observing as she may progress to severe preeclampsia.  Plan for 24-hour urine, we will consult MFM tomorrow as she has gestational diabetes only moderately well-controlled with her current insulin regimen and may need steroids for fetal lung maturity.  2.  Insulin controlled gestational diabetes-she had episodes of hypoglycemia on a weight-based regimen and her most recent regimen has been 8 units of NPH in the morning and 20 units NPH at night.  24 units of short acting with breakfast and 8 with dinner.  3.  Rh negative- she has received RhoGAM  For DVT prophylaxis-SCDs ordered        Paloma Rodriguez MD  01/11/2024  19:55 EST

## 2024-01-12 NOTE — CONSULTS
MATERNAL FETAL MEDICINE Consult Note    Dear Dr Paloma Rodriguez MD:    Thank you for your kind referral of Desi Espinoza.  As you know, she is a 28 y.o.   at  32 6/7 weeks gestation (Estimated Date of Delivery: 3/2/24). This is a consult.      Her antepartum course is complicated by:  Preeclampsia without SF  GDMA2 on insulin    Aneuploidy Screening: low risk    HPI: Today, she denies headache, blurry vision, RUQ pain. No vaginal bleeding, no contractions.     Review of History:  Past Medical History:   Diagnosis Date    Gestational diabetes     Gestational hypertension     Preeclampsia 2024    Pregnancy 2024    Seasonal allergies      Past Surgical History:   Procedure Laterality Date    NO PAST SURGERIES           Social History     Socioeconomic History    Marital status:    Tobacco Use    Smoking status: Never    Smokeless tobacco: Never   Vaping Use    Vaping Use: Never used   Substance and Sexual Activity    Alcohol use: Not Currently     Comment: states once a month    Drug use: No    Sexual activity: Yes     Partners: Male     Birth control/protection: None     Comment: Dong     Family History   Problem Relation Age of Onset    Diabetes Father     Diabetes Mother     Breast cancer Mother     Polycystic ovary syndrome Sister     Congenital heart disease Neg Hx       No Known Allergies   No current facility-administered medications on file prior to encounter.     Current Outpatient Medications on File Prior to Encounter   Medication Sig Dispense Refill    Blood Glucose Monitoring Suppl (Accu-Chek Guide Me) w/Device kit       Glucose Blood (Blood Glucose Test) strip Use to check glucose seven times daily and as needed. 300 each 11    glucose monitor monitoring kit Use four times daily to check blood glucose. 1 each 0    insulin aspart (novoLOG FLEXPEN) 100 UNIT/ML solution pen-injector sc pen Inject subcutaneously 25 units with breakfast and 20 units with dinner. 16 mL 11    insulin  NPH (humuLIN N,novoLIN N) 100 UNIT/ML injection Inject subcutaneously 52 units in the morning and 20 units at bedtime. 8 each 12    Insulin Pen Needle (B-D UF III MINI PEN NEEDLES) 31G X 5 MM misc Use four times daily to inject insulin. 200 each 11    Lancets misc Use to check glucose seven times daily and as needed. 300 each 11    montelukast (SINGULAIR) 10 MG tablet Take 1 tablet by mouth Daily. 90 tablet 3    NovoLIN N FlexPen 100 UNIT/ML injection       omeprazole (priLOSEC) 40 MG capsule Take 1 capsule by mouth Daily. 90 capsule 3    ondansetron (Zofran) 4 MG tablet Take 1 tablet by mouth Every 6 (Six) Hours As Needed for Nausea or Vomiting. 30 tablet 1    prenatal vitamin (prenatal, CLASSIC, vitamin) tablet Take  by mouth Daily.      Glucagon 0.5 MG/0.1ML solution auto-injector Inject 0.1 mL under the skin into the appropriate area as directed 1 (One) Time As Needed (severe hypoglycemia) for up to 1 dose. 0.1 mL 1    Loratadine-Pseudoephedrine (CLARITIN-D 24 HOUR PO) Take  by mouth.      nystatin (MYCOSTATIN) 588270 UNIT/GM powder Apply  topically to the appropriate area as directed 3 (Three) Times a Day. 30 g 2        Past obstetric, gynecological, medical, surgical, family and social history reviewed.  Relevant lab work and imaging reviewed.    Review of systems  Constitutional:  denies fever, chills, malaise.   ENT/Mouth:  denies sore throat, tinnitus  Eyes: denies vision changes/pain  CV:  denies chest pain  Respiratory:  denies cough/SOB  GI:  denies N/V, diarrhea, abdominal pain.    :   denies dysuria  Skin:  denies lesions or pruritus   Neuro:  denies weakness, focal neurologic symptoms    Vitals:    01/12/24 0216 01/12/24 0429 01/12/24 0650 01/12/24 0900   BP: 135/90 147/84 130/77 158/89   BP Location: Right arm Right arm Right arm Left arm   Patient Position: Lying Lying Lying Sitting   Pulse: 72 66 77 76   Resp:  16 16 18   Temp:  97.8 °F (36.6 °C)  97.6 °F (36.4 °C)   TempSrc:  Oral  Oral   SpO2:     98%   Weight:       Height:           PHYSICAL EXAM   GENERAL: Not in acute distress, AAOx3, pleasant  CARDIO: regular rate and rhythm  PULM: symmetric chest rise, speaking in complete sentences without difficulty  NEURO: awake, alert and oriented to person, place, and time  ABDOMINAL: No fundal tenderness, no rebound or guarding, gravid  EXTREMITIES: no bilateral lower extremity edema/tenderness  SKIN: Warm, well-perfused      ULTRASOUND   Please view full ultrasound note on Imaging tab in [employer].      ASSESSMENT/COUNSELIN y.o.   at  32 6/7 weeks gestation (Estimated Date of Delivery: 3/2/24).    -Pregnancy  [ X ] stable  [   ] improving [  ] worsening    There are no diagnoses linked to this encounter.     Pre-eclampsia without severe features  I spoke to this patient, her mother-in-law, and her  at length about pre-eclampsia and the implications of her diagnosis.     Patient has a P/C ratio that is significant. She currently is without symptoms.  I counseled her on pre-eclampsia and that it is a blood pressure issue in pregnancy usually characterized by proteinuria that can affect every organ system and comes from the placenta.  We discussed that it is ALWAYS safest for mom to deliver with a diagnosis of pre-eclampsia but that we often recommend expectant management if mom is stable to optimize both maternal and fetal outcome depending on gestational age.  We discussed that reasons for delivery would be worsening lab values that indicate severe pre-eclampsia (thrombocytopenia <100, twice normal LFT's, Cr >1.1), pulmonary edema, certainly eclampsia, severe BP unable to be controled with IV medications, or HA unresponsive to medications.  I told her that since pre-eclampsia is a placental issue, often the baby tells us when it is time for delivery with signs of distress on fetal heart rate changes.       Right now, I agree with [facility] for fetal lung maturity and decreasing sequela of  prematurity (respiratory distress).  The severe sequella of prematurity are very minimal after 32-34 weeks.  I briefly did discuss with her issues with feeding, temperature regulation, jaundice, hypoglycemia common 32-34 week  babies generally do not have longterm issues and do very well.  Would recommend NICU consult if not done     Right now she has preE without SF.  She asked about inpatient vs outpatient management.  At this point, I left it open-ended.  I have a low threshold to keep people with other comorbidities (GDM), but if she is totally stable with normal labs, no issues in 1-2 weeks she may be a candidate for 2-3x per week outpatient monitoring with weekly labs.  She understands.    She asked about a pass tomorrow for a baby shower.  Discussed limited activity and following criteria be met:  Cat 1 NST, normal AM preE labs, normal to mild BP's, not gone more than 4-6 hours, asymptomatic, okay by primary OB.  If any of this is not met she should not go.      GDMA2:  On NPH  and regular insulin   Check glucose 7x per day (fasting, preprandial, 1 hour postprandial, HS)       Plan  -BID NST as long as fetal status reassuring  -Agree with steroids and low threshold for magnesium if progressing to severe pre-eclampsia and then would proceed with delivery if unable to control pressures/ worsening lab abnormalities.  -Recommend daily PIH labs (LDH, Uric acid, CMP, CBC) for now, if stable a few days at least 2-3x per week  -Would recommend inpatient management for now--can re-eval in 1-2 weeks.  Low threshold to keep if concerns.     -Will monitor sugars--have increased for BMZ and will remotely monitor over weekend  -US while inpatient /Fri  -Right now delivery indicated at 37 weeks--I suspect she will declare herself sooner.      Will continue to follow along.  Please call with any questions.       Thank you for the consult and opportunity to care for this patient.  Please feel free to reach  out with any questions or concerns.      I spent 35 minutes caring for this patient on this date of service. This time includes time spent by me in the following activities: preparing for the visit, reviewing tests, obtaining and/or reviewing a separately obtained history, performing a medically appropriate examination and/or evaluation, counseling and educating the patient/family/caregiver and independently interpreting results and communicating that information with the patient/family/caregiver with greater than 50% spent in counseling and coordination of care.       I spent 3 minutes on the separately reported service of US imaging not included in the time used to support the E/M service also reported today.      María Mcnair MD FACOG  Maternal Fetal Medicine-[facility]  Office: [phone]  [email or web address]

## 2024-01-12 NOTE — PLAN OF CARE
Goal Outcome Evaluation:  Plan of Care Reviewed With: patient        Progress: improving  Outcome Evaluation: RNST, +FM, VSS with no severe range blood pressures. 24 hour urine in progress and will end on 1-12-24 at 1945. cbc and cmp to be drawn this morning. SCD's worn overnight. type and screen in place. pt. did say she had a very slight headache at the begining of the shift that last a very short time and then denied a headache the rest of the night. denied any vision changes or epigastric pain. Patient stated she has a baby shower that is to take place on Saturday and is hopeful she gets to go home or at least have a pass to enjoy the shower and then return to the hospital if she has to stay an inpatient.

## 2024-01-13 PROBLEM — R03.0 PRE-HYPERTENSION: Status: RESOLVED | Noted: 2023-08-01 | Resolved: 2024-01-13

## 2024-01-13 PROBLEM — Z34.01 ENCOUNTER FOR SUPERVISION OF NORMAL FIRST PREGNANCY IN FIRST TRIMESTER: Status: RESOLVED | Noted: 2023-08-01 | Resolved: 2024-01-13

## 2024-01-13 PROBLEM — Z3A.33 33 WEEKS GESTATION OF PREGNANCY: Status: ACTIVE | Noted: 2023-08-01

## 2024-01-13 LAB
ALBUMIN SERPL-MCNC: 3.3 G/DL (ref 3.5–5.2)
ALBUMIN/GLOB SERPL: 1.7 G/DL
ALP SERPL-CCNC: 87 U/L (ref 39–117)
ALT SERPL W P-5'-P-CCNC: 17 U/L (ref 1–33)
ANION GAP SERPL CALCULATED.3IONS-SCNC: 12 MMOL/L (ref 5–15)
AST SERPL-CCNC: 15 U/L (ref 1–32)
BASOPHILS # BLD AUTO: 0.02 10*3/MM3 (ref 0–0.2)
BASOPHILS NFR BLD AUTO: 0.2 % (ref 0–1.5)
BILIRUB SERPL-MCNC: 0.2 MG/DL (ref 0–1.2)
BUN SERPL-MCNC: 8 MG/DL (ref 6–20)
BUN/CREAT SERPL: 13.6 (ref 7–25)
CALCIUM SPEC-SCNC: 8.7 MG/DL (ref 8.6–10.5)
CHLORIDE SERPL-SCNC: 104 MMOL/L (ref 98–107)
CO2 SERPL-SCNC: 19 MMOL/L (ref 22–29)
CREAT SERPL-MCNC: 0.59 MG/DL (ref 0.57–1)
DEPRECATED RDW RBC AUTO: 40.9 FL (ref 37–54)
EGFRCR SERPLBLD CKD-EPI 2021: 124.5 ML/MIN/1.73
EOSINOPHIL # BLD AUTO: 0 10*3/MM3 (ref 0–0.4)
EOSINOPHIL NFR BLD AUTO: 0 % (ref 0.3–6.2)
ERYTHROCYTE [DISTWIDTH] IN BLOOD BY AUTOMATED COUNT: 12.7 % (ref 12.3–15.4)
GLOBULIN UR ELPH-MCNC: 2 GM/DL
GLUCOSE BLDC GLUCOMTR-MCNC: 115 MG/DL (ref 70–130)
GLUCOSE BLDC GLUCOMTR-MCNC: 152 MG/DL (ref 70–130)
GLUCOSE BLDC GLUCOMTR-MCNC: 165 MG/DL (ref 70–130)
GLUCOSE BLDC GLUCOMTR-MCNC: 88 MG/DL (ref 70–130)
GLUCOSE BLDC GLUCOMTR-MCNC: 94 MG/DL (ref 70–130)
GLUCOSE SERPL-MCNC: 89 MG/DL (ref 65–99)
HCT VFR BLD AUTO: 37.4 % (ref 34–46.6)
HGB BLD-MCNC: 12.6 G/DL (ref 12–15.9)
IMM GRANULOCYTES # BLD AUTO: 0.11 10*3/MM3 (ref 0–0.05)
IMM GRANULOCYTES NFR BLD AUTO: 0.8 % (ref 0–0.5)
LDH SERPL-CCNC: 176 U/L (ref 135–214)
LYMPHOCYTES # BLD AUTO: 2 10*3/MM3 (ref 0.7–3.1)
LYMPHOCYTES NFR BLD AUTO: 15.2 % (ref 19.6–45.3)
MCH RBC QN AUTO: 29.8 PG (ref 26.6–33)
MCHC RBC AUTO-ENTMCNC: 33.7 G/DL (ref 31.5–35.7)
MCV RBC AUTO: 88.4 FL (ref 79–97)
MONOCYTES # BLD AUTO: 0.56 10*3/MM3 (ref 0.1–0.9)
MONOCYTES NFR BLD AUTO: 4.3 % (ref 5–12)
NEUTROPHILS NFR BLD AUTO: 10.46 10*3/MM3 (ref 1.7–7)
NEUTROPHILS NFR BLD AUTO: 79.5 % (ref 42.7–76)
NRBC BLD AUTO-RTO: 0 /100 WBC (ref 0–0.2)
PLATELET # BLD AUTO: 223 10*3/MM3 (ref 140–450)
PMV BLD AUTO: 10.9 FL (ref 6–12)
POTASSIUM SERPL-SCNC: 3.8 MMOL/L (ref 3.5–5.2)
PROT SERPL-MCNC: 5.3 G/DL (ref 6–8.5)
RBC # BLD AUTO: 4.23 10*6/MM3 (ref 3.77–5.28)
SODIUM SERPL-SCNC: 135 MMOL/L (ref 136–145)
URATE SERPL-MCNC: 6.8 MG/DL (ref 2.4–5.7)
WBC NRBC COR # BLD AUTO: 13.15 10*3/MM3 (ref 3.4–10.8)

## 2024-01-13 PROCEDURE — 82948 REAGENT STRIP/BLOOD GLUCOSE: CPT

## 2024-01-13 PROCEDURE — 80053 COMPREHEN METABOLIC PANEL: CPT | Performed by: OBSTETRICS & GYNECOLOGY

## 2024-01-13 PROCEDURE — G0378 HOSPITAL OBSERVATION PER HR: HCPCS

## 2024-01-13 PROCEDURE — 83615 LACTATE (LD) (LDH) ENZYME: CPT | Performed by: OBSTETRICS & GYNECOLOGY

## 2024-01-13 PROCEDURE — 84550 ASSAY OF BLOOD/URIC ACID: CPT | Performed by: OBSTETRICS & GYNECOLOGY

## 2024-01-13 PROCEDURE — 63710000001 INSULIN ISOPHANE HUMAN PER 5 UNITS: Performed by: OBSTETRICS & GYNECOLOGY

## 2024-01-13 PROCEDURE — 99232 SBSQ HOSP IP/OBS MODERATE 35: CPT | Performed by: STUDENT IN AN ORGANIZED HEALTH CARE EDUCATION/TRAINING PROGRAM

## 2024-01-13 PROCEDURE — 63710000001 INSULIN REGULAR HUMAN PER 5 UNITS: Performed by: OBSTETRICS & GYNECOLOGY

## 2024-01-13 PROCEDURE — 25010000002 BETAMETHASONE ACET & SOD PHOS PER 4 MG: Performed by: OBSTETRICS & GYNECOLOGY

## 2024-01-13 PROCEDURE — 85025 COMPLETE CBC W/AUTO DIFF WBC: CPT | Performed by: OBSTETRICS & GYNECOLOGY

## 2024-01-13 PROCEDURE — 59025 FETAL NON-STRESS TEST: CPT | Performed by: STUDENT IN AN ORGANIZED HEALTH CARE EDUCATION/TRAINING PROGRAM

## 2024-01-13 PROCEDURE — 59025 FETAL NON-STRESS TEST: CPT

## 2024-01-13 RX ORDER — LORATADINE 10 MG/1
10 TABLET ORAL DAILY
Status: DISCONTINUED | OUTPATIENT
Start: 2024-01-14 | End: 2024-01-18

## 2024-01-13 RX ORDER — PRENATAL VIT/IRON FUM/FOLIC AC 27MG-0.8MG
1 TABLET ORAL DAILY
Status: DISCONTINUED | OUTPATIENT
Start: 2024-01-14 | End: 2024-01-18

## 2024-01-13 RX ORDER — ENOXAPARIN SODIUM 100 MG/ML
40 INJECTION SUBCUTANEOUS DAILY
Status: DISCONTINUED | OUTPATIENT
Start: 2024-01-14 | End: 2024-01-14

## 2024-01-13 RX ORDER — MONTELUKAST SODIUM 10 MG/1
10 TABLET ORAL NIGHTLY
Status: DISCONTINUED | OUTPATIENT
Start: 2024-01-13 | End: 2024-01-18

## 2024-01-13 RX ADMIN — MONTELUKAST SODIUM 10 MG: 10 TABLET, FILM COATED ORAL at 23:35

## 2024-01-13 RX ADMIN — SODIUM CHLORIDE, PRESERVATIVE FREE 10 ML: 5 INJECTION INTRAVENOUS at 09:00

## 2024-01-13 RX ADMIN — INSULIN HUMAN 25 UNITS: 100 INJECTION, SUSPENSION SUBCUTANEOUS at 21:53

## 2024-01-13 RX ADMIN — INSULIN HUMAN 8 UNITS: 100 INJECTION, SOLUTION PARENTERAL at 19:19

## 2024-01-13 RX ADMIN — SODIUM CHLORIDE, PRESERVATIVE FREE 10 ML: 5 INJECTION INTRAVENOUS at 22:06

## 2024-01-13 RX ADMIN — BETAMETHASONE SODIUM PHOSPHATE AND BETAMETHASONE ACETATE 12 MG: 3; 3 INJECTION, SUSPENSION INTRA-ARTICULAR; INTRALESIONAL; INTRAMUSCULAR at 10:51

## 2024-01-13 RX ADMIN — INSULIN HUMAN 12 UNITS: 100 INJECTION, SUSPENSION SUBCUTANEOUS at 08:04

## 2024-01-13 RX ADMIN — INSULIN HUMAN 24 UNITS: 100 INJECTION, SOLUTION PARENTERAL at 08:02

## 2024-01-13 NOTE — NON STRESS TEST
"Obstetrical Non-stress Test Interpretation     Name:  Desi Espinoza  MRN: [id number]    28 y.o. female  at 33w2d    Indication: preeclampsia without severe features      Fetal Assessment  Fetal Movement: active  Fetal HR Assessment Method: external  Fetal HR (beats/min): 140 (period 135)  Fetal HR Baseline: normal range  Fetal HR Variability: moderate (amplitude range 6 to 25 bpm)  Fetal HR Accelerations: lasting at least 15 seconds, greater than/equal to 15 bpm  Fetal HR Decelerations: absent  Sinusoidal Pattern Present: absent    /72 (BP Location: Right arm, Patient Position: Sitting)   Pulse 58   Temp 97.8 °F (36.6 °C) (Oral)   Resp 16   Ht 162.6 cm (64\")   Wt 118 kg (260 lb 9.6 oz)   LMP 2023 (Exact Date)   SpO2 99%   Breastfeeding Yes   BMI 44.73 kg/m²     Reason for test: Antepartum  Date of Test: 1/15/2024  Time frame of test: see below  RN NST Interpretation: Reactive      Teena Pa MD  1/15/2024  13:17 EST  Desi Espinoza, a  at 33w0d with an ARACELY of 3/2/2024, by Last Menstrual Period, was seen at [facility]TEPART[facility] UN[facility] for a nonstress test.    Chief Complaint   Patient presents with    Elevated Blood Pressure     Pt sent from office for bp check and labs       Patient Active Problem List   Diagnosis    Encounter for supervision of normal first pregnancy in first trimester    32 weeks gestation of pregnancy    Pre-hypertension    Rh negative status during pregnancy in first trimester    Group B streptococcal bacteriuria    Environmental allergies    Pregnancy    Preeclampsia    Insulin controlled gestational diabetes mellitus (GDM) in third trimester    Breech presentation       Start Time: 837  Stop Time: 932    Interpretation A  Nonstress Test Interpretation A: Reactive                 "

## 2024-01-13 NOTE — NON STRESS TEST
Reason for test: Antepartum  Date of Test: 2024  Time frame of test: 4011-4619  RN NST Interpretation: Reactive    atul Bowie  at 32w6d with an ARACELY of 3/2/2024, by Last Menstrual Period, was seen at [facility] for a nonstress test.    Chief Complaint   Patient presents with    Elevated Blood Pressure     Pt sent from office for bp check and labs       Patient Active Problem List   Diagnosis    Encounter for supervision of normal first pregnancy in first trimester    32 weeks gestation of pregnancy    Pre-hypertension    Rh negative status during pregnancy in first trimester    Group B streptococcal bacteriuria    Environmental allergies    Pregnancy    Preeclampsia    Insulin controlled gestational diabetes mellitus (GDM) in third trimester    Breech presentation       Start Time:   Stop Time:     Interpretation A  Nonstress Test Interpretation A: Reactive         NST was reviewed:    Patient Active Problem List   Diagnosis    33 weeks gestation of pregnancy    Rh negative status during pregnancy in first trimester    Group B streptococcal bacteriuria    Environmental allergies    Pregnancy    Preeclampsia    Insulin controlled gestational diabetes mellitus (GDM) in third trimester    Breech presentation         33w2d    Indication for preeclampsia and diabetes    NST is reassuring, Cat 1 tracing.      Recommendation:  continue monitoring while inpatient    Reviewed by Mony Fuller MD    07:08 EST

## 2024-01-13 NOTE — PROGRESS NOTES
[facility]   Obstetrics and Gynecology   Antepartum Note      2024    Name:Desi Espinoza    MR#:[id number]    Progress note                        HD:1    Subjective     Chief Complaint   Patient presents with    Elevated Blood Pressure     Pt sent from office for bp check and labs       28 y.o. yo Female  at 33w0d admitted for preeclampsia without severe features.  She was admitted 2 days ago with mild range blood pressures.  Denies headache, blurry vision, shortness of air, right upper quadrant/midepigastric pain.  She has been seen by [facility] who recommended inpatient monitoring for at least a week but potentially until delivery.  Patient is due to receive second dose of betamethasone today.  Fetal monitoring has been reassuring.  Denies contractions, loss of fluid, vaginal bleeding.  Endorses regular fetal movements.    Patient also has gestational diabetes which is managed by [facility] and controlled with insulin.     She has a baby shower later today and therapeutic pass has been approved by [facility] and primary OB.    Patient Active Problem List   Diagnosis    33 weeks gestation of pregnancy    Rh negative status during pregnancy in first trimester    Group B streptococcal bacteriuria    Environmental allergies    Pregnancy    Preeclampsia    Insulin controlled gestational diabetes mellitus (GDM) in third trimester    Breech presentation        Review of system  Review of Systems   All other systems reviewed and are negative.      Objective    Vitals  Temp:  Temp:  [97.8 °F (36.6 °C)-98.1 °F (36.7 °C)] 97.9 °F (36.6 °C)  Temp src: Oral  BP:  BP: (135-154)/(76-88) 141/86  Pulse:  Heart Rate:  [71-78] 75  RR:   Resp:  [16-18] 18    Wt Readings from Last 3 Encounters:   24 119 kg (263 lb)   24 119 kg (263 lb)   24 119 kg (263 lb 3.2 oz)       Body mass index is 45.14 kg/m².    Exam:    Physical Exam  Vitals and nursing note reviewed.   Constitutional:       General: She is not in  acute distress.     Appearance: Normal appearance.   HENT:      Head: Normocephalic and atraumatic.   Eyes:      Extraocular Movements: Extraocular movements intact.   Cardiovascular:      Rate and Rhythm: Normal rate.   Pulmonary:      Effort: Pulmonary effort is normal. No respiratory distress.   Abdominal:      General: There is no distension.      Palpations: Abdomen is soft. There is no mass.      Tenderness: There is no abdominal tenderness.   Musculoskeletal:         General: Normal range of motion.      Cervical back: Normal range of motion.   Skin:     General: Skin is warm and dry.   Neurological:      General: No focal deficit present.      Mental Status: She is alert and oriented to person, place, and time.      Deep Tendon Reflexes: Reflexes normal.   Psychiatric:         Mood and Affect: Mood normal.         Behavior: Behavior normal.         I/O last 3 completed shifts:  In: -   Out: 2800 [Urine:2800]    Results from last 7 days   Lab Units 01/13/24  0413 01/12/24  0701 01/11/24  1653   WBC 10*3/mm3 13.15* 9.97 12.84*   HEMOGLOBIN g/dL 12.6 12.4 12.4   HEMATOCRIT % 37.4 36.5 38.1   PLATELETS 10*3/mm3 223 217 231     Results from last 7 days   Lab Units 01/13/24  0413 01/12/24  0701 01/11/24  1653   SODIUM mmol/L 135* 134* 134*   POTASSIUM mmol/L 3.8 3.9 3.9   CHLORIDE mmol/L 104 103 102   CO2 mmol/L 19.0* 21.0* 20.0*   BUN mg/dL 8 7 10   CREATININE mg/dL 0.59 0.55* 0.53*   CALCIUM mg/dL 8.7 8.3* 8.5*   BILIRUBIN mg/dL 0.2 <0.2 <0.2   ALK PHOS U/L 87 89 91   ALT (SGPT) U/L 17 18 19   AST (SGOT) U/L 15 17 19   GLUCOSE mg/dL 89 75 80       Fetal Tracing:  Reactive, Category I         Assessment/Plan  Intrauterine pregnancy at 33w0d admitted with preeclampsia without severe features    Pregnancy    33 weeks gestation of pregnancy    Rh negative status during pregnancy in first trimester    Group B streptococcal bacteriuria    Preeclampsia    Insulin controlled gestational diabetes mellitus (GDM) in third  trimester    Breech presentation    1.  Preeclampsia without severe features -no signs of severe preeclampsia, patient currently getting daily labs but will likely space soon if they stay stable    2.  Gestational diabetes -continue insulin and management per [facility]  3.  Breech presentation  4.  Obesity -Lovenox ordered for DVT prophylaxis  5.  FWB-NST twice daily    Dispo: Okay to have therapeutic pass today, BMZ prior to leaving, continue inpatient admission      Teena Pa MD  1/13/2024 11:58 EST

## 2024-01-13 NOTE — PLAN OF CARE
Problem: Adult Inpatient Plan of Care  Goal: Plan of Care Review  Outcome: Ongoing, Progressing  Flowsheets (Taken 1/13/2024 1843)  Progress: improving  Outcome Evaluation: VSS, Q4h, RNST, +FM. Patient denies any headache, visual changes, epigastric pain, cramping, LOF, or vaginal bleeding. Patient went to her baby shower today and received 2nd dose of BMZ before leaving. r/v   Goal Outcome Evaluation:           Progress: improving  Outcome Evaluation: VSS, Q4h, RNST, +FM. Patient denies any headache, visual changes, epigastric pain, cramping, LOF, or vaginal bleeding. Patient went to her baby shower today and received 2nd dose of BMZ before leaving. r/v

## 2024-01-13 NOTE — PLAN OF CARE
Goal Outcome Evaluation:  Plan of Care Reviewed With: patient        Progress: no change  Outcome Evaluation: VSS, all blood pressures below severe range through the night.  RNST and active FM reported.  Patient denies headache, vision changes, epigastric pain, cramping, LOF or vaginal bleeding.  Patient slept through the night with no complaints.  24 hour urine completed last night and was 1274.  Results reported to MD.  Patient hoping to be able to get a therapeutic pass today to attend her baby shower then return to the hospital.  Blood sugar elevated last night due to BMZ given but is 89 on AM labs today.

## 2024-01-14 LAB
ABO GROUP BLD: NORMAL
ALBUMIN SERPL-MCNC: 3.7 G/DL (ref 3.5–5.2)
ALBUMIN/GLOB SERPL: 1.5 G/DL
ALP SERPL-CCNC: 104 U/L (ref 39–117)
ALT SERPL W P-5'-P-CCNC: 18 U/L (ref 1–33)
ANION GAP SERPL CALCULATED.3IONS-SCNC: 11 MMOL/L (ref 5–15)
AST SERPL-CCNC: 18 U/L (ref 1–32)
BASOPHILS # BLD AUTO: 0.01 10*3/MM3 (ref 0–0.2)
BASOPHILS NFR BLD AUTO: 0.1 % (ref 0–1.5)
BILIRUB SERPL-MCNC: <0.2 MG/DL (ref 0–1.2)
BLD GP AB SCN SERPL QL: POSITIVE
BUN SERPL-MCNC: 10 MG/DL (ref 6–20)
BUN/CREAT SERPL: 16.9 (ref 7–25)
CALCIUM SPEC-SCNC: 8.8 MG/DL (ref 8.6–10.5)
CHLORIDE SERPL-SCNC: 103 MMOL/L (ref 98–107)
CO2 SERPL-SCNC: 21 MMOL/L (ref 22–29)
CREAT SERPL-MCNC: 0.59 MG/DL (ref 0.57–1)
DEPRECATED RDW RBC AUTO: 39.4 FL (ref 37–54)
EGFRCR SERPLBLD CKD-EPI 2021: 124.5 ML/MIN/1.73
EOSINOPHIL # BLD AUTO: 0.01 10*3/MM3 (ref 0–0.4)
EOSINOPHIL NFR BLD AUTO: 0.1 % (ref 0.3–6.2)
ERYTHROCYTE [DISTWIDTH] IN BLOOD BY AUTOMATED COUNT: 12.2 % (ref 12.3–15.4)
GLOBULIN UR ELPH-MCNC: 2.4 GM/DL
GLUCOSE BLDC GLUCOMTR-MCNC: 103 MG/DL (ref 70–130)
GLUCOSE BLDC GLUCOMTR-MCNC: 109 MG/DL (ref 70–130)
GLUCOSE BLDC GLUCOMTR-MCNC: 135 MG/DL (ref 70–130)
GLUCOSE BLDC GLUCOMTR-MCNC: 177 MG/DL (ref 70–130)
GLUCOSE BLDC GLUCOMTR-MCNC: 58 MG/DL (ref 70–130)
GLUCOSE BLDC GLUCOMTR-MCNC: 73 MG/DL (ref 70–130)
GLUCOSE BLDC GLUCOMTR-MCNC: 77 MG/DL (ref 70–130)
GLUCOSE BLDC GLUCOMTR-MCNC: 82 MG/DL (ref 70–130)
GLUCOSE SERPL-MCNC: 98 MG/DL (ref 65–99)
HCT VFR BLD AUTO: 39.9 % (ref 34–46.6)
HGB BLD-MCNC: 13.3 G/DL (ref 12–15.9)
IMM GRANULOCYTES # BLD AUTO: 0.09 10*3/MM3 (ref 0–0.05)
IMM GRANULOCYTES NFR BLD AUTO: 0.7 % (ref 0–0.5)
LDH SERPL-CCNC: 233 U/L (ref 135–214)
LYMPHOCYTES # BLD AUTO: 2.63 10*3/MM3 (ref 0.7–3.1)
LYMPHOCYTES NFR BLD AUTO: 19.9 % (ref 19.6–45.3)
MCH RBC QN AUTO: 29.4 PG (ref 26.6–33)
MCHC RBC AUTO-ENTMCNC: 33.3 G/DL (ref 31.5–35.7)
MCV RBC AUTO: 88.3 FL (ref 79–97)
MONOCYTES # BLD AUTO: 1.04 10*3/MM3 (ref 0.1–0.9)
MONOCYTES NFR BLD AUTO: 7.9 % (ref 5–12)
NEUTROPHILS NFR BLD AUTO: 71.3 % (ref 42.7–76)
NEUTROPHILS NFR BLD AUTO: 9.41 10*3/MM3 (ref 1.7–7)
NRBC BLD AUTO-RTO: 0 /100 WBC (ref 0–0.2)
PLATELET # BLD AUTO: 257 10*3/MM3 (ref 140–450)
PMV BLD AUTO: 11.5 FL (ref 6–12)
POTASSIUM SERPL-SCNC: 4 MMOL/L (ref 3.5–5.2)
PROT SERPL-MCNC: 6.1 G/DL (ref 6–8.5)
RBC # BLD AUTO: 4.52 10*6/MM3 (ref 3.77–5.28)
RESIDUAL RHIG DETECTED: NORMAL
RH BLD: NEGATIVE
SODIUM SERPL-SCNC: 135 MMOL/L (ref 136–145)
T&S EXPIRATION DATE: NORMAL
URATE SERPL-MCNC: 7.4 MG/DL (ref 2.4–5.7)
WBC NRBC COR # BLD AUTO: 13.19 10*3/MM3 (ref 3.4–10.8)

## 2024-01-14 PROCEDURE — 86901 BLOOD TYPING SEROLOGIC RH(D): CPT | Performed by: OBSTETRICS & GYNECOLOGY

## 2024-01-14 PROCEDURE — 80053 COMPREHEN METABOLIC PANEL: CPT | Performed by: OBSTETRICS & GYNECOLOGY

## 2024-01-14 PROCEDURE — G0378 HOSPITAL OBSERVATION PER HR: HCPCS

## 2024-01-14 PROCEDURE — 63710000001 INSULIN REGULAR HUMAN PER 5 UNITS: Performed by: OBSTETRICS & GYNECOLOGY

## 2024-01-14 PROCEDURE — 99232 SBSQ HOSP IP/OBS MODERATE 35: CPT | Performed by: OBSTETRICS & GYNECOLOGY

## 2024-01-14 PROCEDURE — 86850 RBC ANTIBODY SCREEN: CPT | Performed by: OBSTETRICS & GYNECOLOGY

## 2024-01-14 PROCEDURE — 86870 RBC ANTIBODY IDENTIFICATION: CPT | Performed by: OBSTETRICS & GYNECOLOGY

## 2024-01-14 PROCEDURE — 85025 COMPLETE CBC W/AUTO DIFF WBC: CPT | Performed by: OBSTETRICS & GYNECOLOGY

## 2024-01-14 PROCEDURE — 84550 ASSAY OF BLOOD/URIC ACID: CPT | Performed by: OBSTETRICS & GYNECOLOGY

## 2024-01-14 PROCEDURE — 59025 FETAL NON-STRESS TEST: CPT

## 2024-01-14 PROCEDURE — 86900 BLOOD TYPING SEROLOGIC ABO: CPT | Performed by: OBSTETRICS & GYNECOLOGY

## 2024-01-14 PROCEDURE — 82948 REAGENT STRIP/BLOOD GLUCOSE: CPT

## 2024-01-14 PROCEDURE — 83615 LACTATE (LD) (LDH) ENZYME: CPT | Performed by: OBSTETRICS & GYNECOLOGY

## 2024-01-14 PROCEDURE — 59025 FETAL NON-STRESS TEST: CPT | Performed by: OBSTETRICS & GYNECOLOGY

## 2024-01-14 PROCEDURE — 63710000001 INSULIN ISOPHANE HUMAN PER 5 UNITS: Performed by: OBSTETRICS & GYNECOLOGY

## 2024-01-14 RX ORDER — ONDANSETRON 4 MG/1
4 TABLET, FILM COATED ORAL EVERY 8 HOURS PRN
Status: DISCONTINUED | OUTPATIENT
Start: 2024-01-14 | End: 2024-01-18

## 2024-01-14 RX ORDER — HEPARIN SODIUM 5000 [USP'U]/ML
10000 INJECTION, SOLUTION INTRAVENOUS; SUBCUTANEOUS EVERY 12 HOURS SCHEDULED
Status: DISCONTINUED | OUTPATIENT
Start: 2024-01-14 | End: 2024-01-17

## 2024-01-14 RX ADMIN — MONTELUKAST SODIUM 10 MG: 10 TABLET, FILM COATED ORAL at 22:49

## 2024-01-14 RX ADMIN — Medication 1 TABLET: at 09:49

## 2024-01-14 RX ADMIN — SODIUM CHLORIDE, PRESERVATIVE FREE 10 ML: 5 INJECTION INTRAVENOUS at 09:45

## 2024-01-14 RX ADMIN — LORATADINE 10 MG: 10 TABLET ORAL at 18:32

## 2024-01-14 RX ADMIN — INSULIN HUMAN 25 UNITS: 100 INJECTION, SUSPENSION SUBCUTANEOUS at 21:10

## 2024-01-14 RX ADMIN — INSULIN HUMAN 12 UNITS: 100 INJECTION, SUSPENSION SUBCUTANEOUS at 09:48

## 2024-01-14 RX ADMIN — INSULIN HUMAN 24 UNITS: 100 INJECTION, SOLUTION PARENTERAL at 09:47

## 2024-01-14 RX ADMIN — SODIUM CHLORIDE, PRESERVATIVE FREE 10 ML: 5 INJECTION INTRAVENOUS at 21:12

## 2024-01-14 RX ADMIN — INSULIN HUMAN 8 UNITS: 100 INJECTION, SOLUTION PARENTERAL at 19:38

## 2024-01-14 NOTE — PLAN OF CARE
Goal Outcome Evaluation:  Plan of Care Reviewed With: patient        Progress: no change  Outcome Evaluation: VSS, all blood pressures below severe range.  RNST and active FM reported.  Patient denies headache, vision changes, cramping, LOF or vaginal bleeding.  Patient slept through the night with no complaints.  Patient very happy over baing able to go to her baby shower.  Blood sugars elevated but patient received second dose of BMZ.

## 2024-01-14 NOTE — NON STRESS TEST
Desi АЛЕКСАНДР Espinoza, a  at 33w1d with an ARACELY of 3/2/2024, by Last Menstrual Period, was seen at [facility] ANTEPARTUM UNIT for a nonstress test.    Chief Complaint   Patient presents with    Elevated Blood Pressure     Pt sent from office for bp check and labs       Patient Active Problem List   Diagnosis    33 weeks gestation of pregnancy    Rh negative status during pregnancy in first trimester    Group B streptococcal bacteriuria    Environmental allergies    Pregnancy    Preeclampsia    Insulin controlled gestational diabetes mellitus (GDM) in third trimester    Breech presentation       Start Time: 1036  Stop Time: 1153    Interpretation A  Nonstress Test Interpretation A: Reactive

## 2024-01-14 NOTE — NURSING NOTE
Patient going to eat dinner now so AC blood sugar checked and dinner insulin given now.  Patient to eat dinner then will call RN to do vital signs and assessment as well as time PP blood sugar due.

## 2024-01-14 NOTE — PROGRESS NOTES
ANTEPARTUM ROUNDING NOTE     Admission date 2024     SUBJECTIVE:     Desi Espinoza is a 28 y.o.,  33w1d admitted for preeclampsia without severe features.  Patient has received betamethasone course. Denies vaginal bleeding, leakage of fluid, or contractions.  The patient notes good fetal movement.     OBJECTIVE:     Vitals:   Vitals:    24 2340 24 0410 24 0552 24 0937   BP: 149/81 140/71  154/77   BP Location: Left arm Left arm  Left arm   Patient Position: Sitting Lying  Sitting   Pulse: 60 72  71   Resp: 16 16  18   Temp: 97.5 °F (36.4 °C) 98.2 °F (36.8 °C)  97.9 °F (36.6 °C)   TempSrc: Oral Oral  Oral   SpO2: 98% 99%  98%   Weight:   118 kg (260 lb)    Height:           Results from last 7 days   Lab Units 24  0601   WBC 10*3/mm3 13.19*   HEMOGLOBIN g/dL 13.3   HEMATOCRIT % 39.9   PLATELETS 10*3/mm3 257     Results from last 7 days   Lab Units 24  0601   SODIUM mmol/L 135*   POTASSIUM mmol/L 4.0   CHLORIDE mmol/L 103   CO2 mmol/L 21.0*   BUN mg/dL 10   CREATININE mg/dL 0.59   CALCIUM mg/dL 8.8   BILIRUBIN mg/dL <0.2   ALK PHOS U/L 104   ALT (SGPT) U/L 18   AST (SGOT) U/L 18   GLUCOSE mg/dL 98       Fetal heart rate tracing-reactive and category 1  Ultrasound on  showed estimated fetal weight of 4 pounds 11 ounces at the 52nd percentile.  Abdominal circumference at the 75th percentile.  Baby is now vertex.  BPP was 8 out of 8.    EXAM:  Appearance/Psychiatric: In no distress   Constitutional: The patient is well nourished   Cardiovascular: She does not have edema.    Respiratory: Respiratory effort is normal.   Abdomen: Soft, gravid, and nontender   Ext: nontender, no edema.     ASSESSMENT AND PLAN:     Pregnancy    33 weeks gestation of pregnancy    Rh negative status during pregnancy in first trimester    Group B streptococcal bacteriuria    Preeclampsia    Insulin controlled gestational diabetes mellitus (GDM) in third trimester    Breech  presentation    28-year-old  1 para 0 at 33-1/7 weeks with preeclampsia without severe features    Preeclampsia without severe features-blood pressures are in mild range today.  Labs are normal  Gestational diabetes-continue insulin  Vertex presentation  Obesity-the patient was started on Lovenox yesterday but she declined.  We discussed reasoning for DVT prophylaxis.  Patient is using her SCDs.  I will change the patient to heparin.  Patient states she will consider.  Fetal wellbeing-NST twice daily.  BPP's on Tuesday and Friday.

## 2024-01-14 NOTE — PLAN OF CARE
Problem: Adult Inpatient Plan of Care  Goal: Plan of Care Review  Outcome: Ongoing, Progressing  Flowsheets  Taken 1/14/2024 1752 by Ashly Vann RN  Outcome Evaluation: VSS, Q4H BP, RNST, active FM. Patient denies any headache, visual changes, cramping, LOF or vaginal bleeding. Will continue to monitor and further assess.  Taken 1/14/2024 0520 by Adriana Marcelo RN  Plan of Care Reviewed With: patient   Goal Outcome Evaluation:           Progress: improving  Outcome Evaluation: VSS, Q4H BP, RNST, active FM. Patient denies any headache, visual changes, cramping, LOF or vaginal bleeding. Will continue to monitor and further assess.

## 2024-01-14 NOTE — NON STRESS TEST
Desi АЛЕКСАНДР Alexis, a  at 33w0d with an ARACELY of 3/2/2024, by Last Menstrual Period, was seen at [facility] ANTEPARTUM UNIT for a nonstress test.    Chief Complaint   Patient presents with    Elevated Blood Pressure     Pt sent from office for bp check and labs       Patient Active Problem List   Diagnosis    33 weeks gestation of pregnancy    Rh negative status during pregnancy in first trimester    Group B streptococcal bacteriuria    Environmental allergies    Pregnancy    Preeclampsia    Insulin controlled gestational diabetes mellitus (GDM) in third trimester    Breech presentation       Start Time:   Stop Time:     Interpretation A  Nonstress Test Interpretation A: Reactive

## 2024-01-14 NOTE — NON STRESS TEST
Reason for test: Antepartum  Date of Test: 2024  Time frame of test:   RN NST Interpretation: Reactive    Desi Espinoza, atul  at 33w1d with an ARACELY of 3/2/2024, by Last Menstrual Period, was seen at [facility] ANTEPARTUM UNIT for a nonstress test.    Chief Complaint   Patient presents with    Elevated Blood Pressure     Pt sent from office for bp check and labs       Patient Active Problem List   Diagnosis    33 weeks gestation of pregnancy    Rh negative status during pregnancy in first trimester    Group B streptococcal bacteriuria    Environmental allergies    Pregnancy    Preeclampsia    Insulin controlled gestational diabetes mellitus (GDM) in third trimester    Breech presentation       Start Time:   Stop Time:     Interpretation A  Nonstress Test Interpretation A: Reactive

## 2024-01-15 LAB
ALBUMIN SERPL-MCNC: 2.8 G/DL (ref 3.5–5.2)
ALBUMIN/GLOB SERPL: 1.3 G/DL
ALP SERPL-CCNC: 83 U/L (ref 39–117)
ALT SERPL W P-5'-P-CCNC: 17 U/L (ref 1–33)
ANION GAP SERPL CALCULATED.3IONS-SCNC: 10.6 MMOL/L (ref 5–15)
AST SERPL-CCNC: 18 U/L (ref 1–32)
BASOPHILS # BLD AUTO: 0.03 10*3/MM3 (ref 0–0.2)
BASOPHILS NFR BLD AUTO: 0.2 % (ref 0–1.5)
BILIRUB SERPL-MCNC: <0.2 MG/DL (ref 0–1.2)
BUN SERPL-MCNC: 10 MG/DL (ref 6–20)
BUN/CREAT SERPL: 18.2 (ref 7–25)
CALCIUM SPEC-SCNC: 8.2 MG/DL (ref 8.6–10.5)
CHLORIDE SERPL-SCNC: 106 MMOL/L (ref 98–107)
CO2 SERPL-SCNC: 21.4 MMOL/L (ref 22–29)
CREAT SERPL-MCNC: 0.55 MG/DL (ref 0.57–1)
DEPRECATED RDW RBC AUTO: 40.2 FL (ref 37–54)
EGFRCR SERPLBLD CKD-EPI 2021: 126.6 ML/MIN/1.73
EOSINOPHIL # BLD AUTO: 0.04 10*3/MM3 (ref 0–0.4)
EOSINOPHIL NFR BLD AUTO: 0.3 % (ref 0.3–6.2)
ERYTHROCYTE [DISTWIDTH] IN BLOOD BY AUTOMATED COUNT: 12.3 % (ref 12.3–15.4)
GLOBULIN UR ELPH-MCNC: 2.1 GM/DL
GLUCOSE BLDC GLUCOMTR-MCNC: 143 MG/DL (ref 70–130)
GLUCOSE BLDC GLUCOMTR-MCNC: 59 MG/DL (ref 70–130)
GLUCOSE BLDC GLUCOMTR-MCNC: 65 MG/DL (ref 70–130)
GLUCOSE BLDC GLUCOMTR-MCNC: 67 MG/DL (ref 70–130)
GLUCOSE BLDC GLUCOMTR-MCNC: 75 MG/DL (ref 70–130)
GLUCOSE BLDC GLUCOMTR-MCNC: 80 MG/DL (ref 70–130)
GLUCOSE BLDC GLUCOMTR-MCNC: 85 MG/DL (ref 70–130)
GLUCOSE BLDC GLUCOMTR-MCNC: 94 MG/DL (ref 70–130)
GLUCOSE SERPL-MCNC: 61 MG/DL (ref 65–99)
HCT VFR BLD AUTO: 35 % (ref 34–46.6)
HGB BLD-MCNC: 11.9 G/DL (ref 12–15.9)
IMM GRANULOCYTES # BLD AUTO: 0.06 10*3/MM3 (ref 0–0.05)
IMM GRANULOCYTES NFR BLD AUTO: 0.5 % (ref 0–0.5)
LDH SERPL-CCNC: 178 U/L (ref 135–214)
LYMPHOCYTES # BLD AUTO: 3.93 10*3/MM3 (ref 0.7–3.1)
LYMPHOCYTES NFR BLD AUTO: 32.1 % (ref 19.6–45.3)
MCH RBC QN AUTO: 30.2 PG (ref 26.6–33)
MCHC RBC AUTO-ENTMCNC: 34 G/DL (ref 31.5–35.7)
MCV RBC AUTO: 88.8 FL (ref 79–97)
MONOCYTES # BLD AUTO: 1.03 10*3/MM3 (ref 0.1–0.9)
MONOCYTES NFR BLD AUTO: 8.4 % (ref 5–12)
NEUTROPHILS NFR BLD AUTO: 58.5 % (ref 42.7–76)
NEUTROPHILS NFR BLD AUTO: 7.16 10*3/MM3 (ref 1.7–7)
NRBC BLD AUTO-RTO: 0 /100 WBC (ref 0–0.2)
PLATELET # BLD AUTO: 215 10*3/MM3 (ref 140–450)
PMV BLD AUTO: 11.4 FL (ref 6–12)
POTASSIUM SERPL-SCNC: 4.1 MMOL/L (ref 3.5–5.2)
PROT SERPL-MCNC: 4.9 G/DL (ref 6–8.5)
RBC # BLD AUTO: 3.94 10*6/MM3 (ref 3.77–5.28)
SODIUM SERPL-SCNC: 138 MMOL/L (ref 136–145)
URATE SERPL-MCNC: 7.3 MG/DL (ref 2.4–5.7)
WBC NRBC COR # BLD AUTO: 12.25 10*3/MM3 (ref 3.4–10.8)

## 2024-01-15 PROCEDURE — 83615 LACTATE (LD) (LDH) ENZYME: CPT | Performed by: OBSTETRICS & GYNECOLOGY

## 2024-01-15 PROCEDURE — 59025 FETAL NON-STRESS TEST: CPT | Performed by: STUDENT IN AN ORGANIZED HEALTH CARE EDUCATION/TRAINING PROGRAM

## 2024-01-15 PROCEDURE — 80053 COMPREHEN METABOLIC PANEL: CPT | Performed by: OBSTETRICS & GYNECOLOGY

## 2024-01-15 PROCEDURE — 59025 FETAL NON-STRESS TEST: CPT

## 2024-01-15 PROCEDURE — 63710000001 INSULIN REGULAR HUMAN PER 5 UNITS: Performed by: OBSTETRICS & GYNECOLOGY

## 2024-01-15 PROCEDURE — 82948 REAGENT STRIP/BLOOD GLUCOSE: CPT

## 2024-01-15 PROCEDURE — 99232 SBSQ HOSP IP/OBS MODERATE 35: CPT | Performed by: STUDENT IN AN ORGANIZED HEALTH CARE EDUCATION/TRAINING PROGRAM

## 2024-01-15 PROCEDURE — 84550 ASSAY OF BLOOD/URIC ACID: CPT | Performed by: OBSTETRICS & GYNECOLOGY

## 2024-01-15 PROCEDURE — 85025 COMPLETE CBC W/AUTO DIFF WBC: CPT | Performed by: OBSTETRICS & GYNECOLOGY

## 2024-01-15 PROCEDURE — 63710000001 INSULIN ISOPHANE HUMAN PER 5 UNITS: Performed by: OBSTETRICS & GYNECOLOGY

## 2024-01-15 RX ADMIN — LORATADINE 10 MG: 10 TABLET ORAL at 07:40

## 2024-01-15 RX ADMIN — INSULIN HUMAN 12 UNITS: 100 INJECTION, SUSPENSION SUBCUTANEOUS at 07:11

## 2024-01-15 RX ADMIN — INSULIN HUMAN 24 UNITS: 100 INJECTION, SOLUTION PARENTERAL at 07:37

## 2024-01-15 RX ADMIN — SODIUM CHLORIDE, PRESERVATIVE FREE 10 ML: 5 INJECTION INTRAVENOUS at 20:37

## 2024-01-15 RX ADMIN — SODIUM CHLORIDE, PRESERVATIVE FREE 10 ML: 5 INJECTION INTRAVENOUS at 08:37

## 2024-01-15 RX ADMIN — MONTELUKAST SODIUM 10 MG: 10 TABLET, FILM COATED ORAL at 21:34

## 2024-01-15 RX ADMIN — INSULIN HUMAN 8 UNITS: 100 INJECTION, SOLUTION PARENTERAL at 18:06

## 2024-01-15 RX ADMIN — INSULIN HUMAN 25 UNITS: 100 INJECTION, SUSPENSION SUBCUTANEOUS at 21:35

## 2024-01-15 RX ADMIN — Medication 1 TABLET: at 07:37

## 2024-01-15 NOTE — NON STRESS TEST
Desi АЛЕКСАНДР Alexis, a  at 33w2d with an ARACELY of 3/2/2024, by Last Menstrual Period, was seen at [facility] ANTEPARTUM UNIT for a nonstress test.    Chief Complaint   Patient presents with    Elevated Blood Pressure     Pt sent from office for bp check and labs       Patient Active Problem List   Diagnosis    33 weeks gestation of pregnancy    Rh negative status during pregnancy in first trimester    Group B streptococcal bacteriuria    Environmental allergies    Pregnancy    Preeclampsia    Insulin controlled gestational diabetes mellitus (GDM) in third trimester    Breech presentation       Start Time: 809  Stop Time: 837    Interpretation A  Nonstress Test Interpretation A: Reactive

## 2024-01-15 NOTE — PLAN OF CARE
Goal Outcome Evaluation:  Plan of Care Reviewed With: patient        Progress: no change  Outcome Evaluation: RNST, +FM, VSS and afebrile. No blood pressures were in severe range.the patient denied having any headache, Patient denies feeling any regular or painful contractions, vaginal bleeding or leaking of any fluid at this time. Blood sugar just before dinner was 77, 1hour postprandial result was 177 and nighttime BS reading was 109. pt. voiced no c oncerns tonight and slept well overnight.

## 2024-01-15 NOTE — NURSING NOTE
"Diabetes Education  Assessment/Teaching    Patient Name:  Desi Espinoza  YOB: 1995  MRN: [id number]  Admit Date:  1/11/2024      Assessment Date:  1/15/2024  Flowsheet Row Most Recent Value   General Information     Referral From: MD order. Call and speak to pt on mobile to assess needs for DM ed.    Height 162.6 cm (64\")   Height Method Stated   Weight 118 kg (260 lb 9.6 oz)   Weight Method Standing scale   Diabetes History    What type of diabetes do you have? Gestational   Current DM knowledge -- Pt states both parents have DM.   Do you test your blood sugar at home? yes   Have you had low blood sugar? (<70mg/dl) yes   Education Preferences    Barriers to Learning -- no learning barriers evident at this time.   Nutrition Information Discuss. Pt declines RD consult.    Assessment Topics    Taking Medication - Assessment Needs education/Review-review insulin names and action.   Healthy Coping - Assessment Competent   Monitoring - Assessment Competent   DM Goals    Contact Plan Follow-up medical care            Flowsheet Row Most Recent Value   DM Education Needs    Meter Has own   Frequency of Testing -- 7 times daily per MFM.   Blood Glucose Target -- review fasting target of 60-90 per MFM   Medication Insulin -fast-acting Novolog name and intermediate Novolin are pt home insulins. discuss the importance of rotating insulin injection sites.   Problem Solving Hypoglycemia, Signs -advise pt alert nursing staff in case of hypoglycemia sxs.   Healthy Eating -- Discuss consult w/RD. pt declines.   Healthy Coping Appropriate   Discharge Plan Follow-up with MD   Teaching Method Discussion   Patient Response Verbalized understanding        Other Comments:  Anticipate pt to have close f/u between OB and MFM follow ups.   Electronically signed by:  Radha Cardoso RN  01/15/24 16:51 EST  "

## 2024-01-15 NOTE — PLAN OF CARE
Goal Outcome Evaluation:  Plan of Care Reviewed With: patient           Outcome Evaluation: VSS, no severe range pressures noted. Pt denies HA, Visual changes, RUQ pain, and N/V. Pt reports active fetal movemement. Denies leaking of fluid, bleeding, or ctx. BS monitored and insulin administered per order. Pt ambulated up and down evans today. BPP in AM.

## 2024-01-15 NOTE — NON STRESS TEST
Desi АЛЕКСАНДР Espinoza, a  at 33w1d with an ARACELY of 3/2/2024, by Last Menstrual Period, was seen at [facility] ANTEPARTUM UNIT for a nonstress test.    Chief Complaint   Patient presents with    Elevated Blood Pressure     Pt sent from office for bp check and labs       Patient Active Problem List   Diagnosis    33 weeks gestation of pregnancy    Rh negative status during pregnancy in first trimester    Group B streptococcal bacteriuria    Environmental allergies    Pregnancy    Preeclampsia    Insulin controlled gestational diabetes mellitus (GDM) in third trimester    Breech presentation       Start Time:   Stop Time:     Interpretation A  Nonstress Test Interpretation A: Reactive      REACTIVE

## 2024-01-15 NOTE — PROGRESS NOTES
[facility]   Obstetrics and Gynecology   Antepartum Note      1/15/2024    Name:Desi Espinoza    MR#:[id number]    Progress note                        HD:1    Subjective     Chief Complaint   Patient presents with    Elevated Blood Pressure     Pt sent from office for bp check and labs       28 y.o. yo Female  at 33w2d presents with preeclampsia without severe features.  She has been stable since admission with mild range blood pressure and no severe features.  She has received BMZ.  MFM is following for management of GDMA2, insulin-controlled.      She feels well today.   Denies contractions, loss of fluid, vaginal bleeding.  Endorses regular fetal movements.    Denies headache, blurry vision, shortness of air, right upper quadrant/midepigastric pain.       Patient Active Problem List   Diagnosis    33 weeks gestation of pregnancy    Rh negative status during pregnancy in first trimester    Group B streptococcal bacteriuria    Environmental allergies    Pregnancy    Preeclampsia    Insulin controlled gestational diabetes mellitus (GDM) in third trimester    Breech presentation        Review of system  Review of Systems   All other systems reviewed and are negative.      Objective    Vitals  Temp:  Temp:  [97.7 °F (36.5 °C)-97.8 °F (36.6 °C)] 97.8 °F (36.6 °C)  Temp src: Oral  BP:  BP: (135-157)/(72-86) 147/72  Pulse:  Heart Rate:  [54-75] 58  RR:   Resp:  [16-20] 16    Wt Readings from Last 3 Encounters:   01/15/24 118 kg (260 lb 9.6 oz)   24 119 kg (263 lb)   24 119 kg (263 lb 3.2 oz)       Body mass index is 44.73 kg/m².    Exam:    Physical Exam  Vitals and nursing note reviewed.   Constitutional:       General: She is not in acute distress.     Appearance: Normal appearance.   HENT:      Head: Normocephalic and atraumatic.   Eyes:      Extraocular Movements: Extraocular movements intact.   Cardiovascular:      Rate and Rhythm: Normal rate.   Pulmonary:      Effort: Pulmonary  effort is normal. No respiratory distress.   Abdominal:      General: There is no distension.      Palpations: Abdomen is soft. There is no mass.      Tenderness: There is no abdominal tenderness.   Musculoskeletal:         General: Normal range of motion.      Cervical back: Normal range of motion.   Skin:     General: Skin is warm and dry.   Neurological:      General: No focal deficit present.      Mental Status: She is alert and oriented to person, place, and time.      Deep Tendon Reflexes: Reflexes normal.   Psychiatric:         Mood and Affect: Mood normal.         Behavior: Behavior normal.         No intake/output data recorded.    Results from last 7 days   Lab Units 01/15/24  0529 01/14/24  0601 01/13/24 0413   WBC 10*3/mm3 12.25* 13.19* 13.15*   HEMOGLOBIN g/dL 11.9* 13.3 12.6   HEMATOCRIT % 35.0 39.9 37.4   PLATELETS 10*3/mm3 215 257 223     Results from last 7 days   Lab Units 01/15/24  0529 01/14/24  0601 01/13/24 0413   SODIUM mmol/L 138 135* 135*   POTASSIUM mmol/L 4.1 4.0 3.8   CHLORIDE mmol/L 106 103 104   CO2 mmol/L 21.4* 21.0* 19.0*   BUN mg/dL 10 10 8   CREATININE mg/dL 0.55* 0.59 0.59   CALCIUM mg/dL 8.2* 8.8 8.7   BILIRUBIN mg/dL <0.2 <0.2 0.2   ALK PHOS U/L 83 104 87   ALT (SGPT) U/L 17 18 17   AST (SGOT) U/L 18 18 15   GLUCOSE mg/dL 61* 98 89       Fetal Tracing:  Reactive, Category I         Assessment/Plan  Intrauterine pregnancy at 33w2d presents with preeclampsia without severe features    Pregnancy    33 weeks gestation of pregnancy    Rh negative status during pregnancy in first trimester    Group B streptococcal bacteriuria    Preeclampsia    Insulin controlled gestational diabetes mellitus (GDM) in third trimester    Breech presentation    1.  Preeclampsia without severe features -no signs of severe preeclampsia, HELLP labs changed to twice weekly unless clinically significant change occurs, s/p BMZ  2.  Gestational diabetes -continue insulin and management per [facility]  3.  Breech  presentation  4.  Obesity -Lovenox ordered for DVT prophylaxis but patient declined, SCDs ordered  5.  FWB - NST twice daily, BPP Tues/Fri     Dispo: continue inpatient admission      Teena Pa MD  1/15/2024 12:21 EST

## 2024-01-16 ENCOUNTER — APPOINTMENT (OUTPATIENT)
Dept: ULTRASOUND IMAGING | Facility: HOSPITAL | Age: 29
End: 2024-01-16
Payer: COMMERCIAL

## 2024-01-16 LAB
ALBUMIN SERPL-MCNC: 3.3 G/DL (ref 3.5–5.2)
ALBUMIN/GLOB SERPL: 1.6 G/DL
ALP SERPL-CCNC: 97 U/L (ref 39–117)
ALT SERPL W P-5'-P-CCNC: 21 U/L (ref 1–33)
ANION GAP SERPL CALCULATED.3IONS-SCNC: 10 MMOL/L (ref 5–15)
AST SERPL-CCNC: 16 U/L (ref 1–32)
BASOPHILS # BLD AUTO: 0.03 10*3/MM3 (ref 0–0.2)
BASOPHILS NFR BLD AUTO: 0.2 % (ref 0–1.5)
BILIRUB SERPL-MCNC: 0.2 MG/DL (ref 0–1.2)
BUN SERPL-MCNC: 9 MG/DL (ref 6–20)
BUN/CREAT SERPL: 14.1 (ref 7–25)
CALCIUM SPEC-SCNC: 9.2 MG/DL (ref 8.6–10.5)
CHLORIDE SERPL-SCNC: 105 MMOL/L (ref 98–107)
CO2 SERPL-SCNC: 21 MMOL/L (ref 22–29)
CREAT SERPL-MCNC: 0.64 MG/DL (ref 0.57–1)
DEPRECATED RDW RBC AUTO: 37.6 FL (ref 37–54)
EGFRCR SERPLBLD CKD-EPI 2021: 122.1 ML/MIN/1.73
EOSINOPHIL # BLD AUTO: 0.03 10*3/MM3 (ref 0–0.4)
EOSINOPHIL NFR BLD AUTO: 0.2 % (ref 0.3–6.2)
ERYTHROCYTE [DISTWIDTH] IN BLOOD BY AUTOMATED COUNT: 12 % (ref 12.3–15.4)
GLOBULIN UR ELPH-MCNC: 2.1 GM/DL
GLUCOSE BLDC GLUCOMTR-MCNC: 110 MG/DL (ref 70–130)
GLUCOSE BLDC GLUCOMTR-MCNC: 54 MG/DL (ref 70–130)
GLUCOSE BLDC GLUCOMTR-MCNC: 60 MG/DL (ref 70–130)
GLUCOSE BLDC GLUCOMTR-MCNC: 64 MG/DL (ref 70–130)
GLUCOSE BLDC GLUCOMTR-MCNC: 66 MG/DL (ref 70–130)
GLUCOSE BLDC GLUCOMTR-MCNC: 69 MG/DL (ref 70–130)
GLUCOSE BLDC GLUCOMTR-MCNC: 72 MG/DL (ref 70–130)
GLUCOSE BLDC GLUCOMTR-MCNC: 77 MG/DL (ref 70–130)
GLUCOSE SERPL-MCNC: 71 MG/DL (ref 65–99)
HCT VFR BLD AUTO: 38.9 % (ref 34–46.6)
HGB BLD-MCNC: 12.8 G/DL (ref 12–15.9)
IMM GRANULOCYTES # BLD AUTO: 0.07 10*3/MM3 (ref 0–0.05)
IMM GRANULOCYTES NFR BLD AUTO: 0.6 % (ref 0–0.5)
LDH SERPL-CCNC: 183 U/L (ref 135–214)
LYMPHOCYTES # BLD AUTO: 3.34 10*3/MM3 (ref 0.7–3.1)
LYMPHOCYTES NFR BLD AUTO: 26.5 % (ref 19.6–45.3)
MCH RBC QN AUTO: 28.3 PG (ref 26.6–33)
MCHC RBC AUTO-ENTMCNC: 32.9 G/DL (ref 31.5–35.7)
MCV RBC AUTO: 85.9 FL (ref 79–97)
MONOCYTES # BLD AUTO: 1.1 10*3/MM3 (ref 0.1–0.9)
MONOCYTES NFR BLD AUTO: 8.7 % (ref 5–12)
NEUTROPHILS NFR BLD AUTO: 63.8 % (ref 42.7–76)
NEUTROPHILS NFR BLD AUTO: 8.05 10*3/MM3 (ref 1.7–7)
NRBC BLD AUTO-RTO: 0 /100 WBC (ref 0–0.2)
PLATELET # BLD AUTO: 238 10*3/MM3 (ref 140–450)
PMV BLD AUTO: 11.3 FL (ref 6–12)
POTASSIUM SERPL-SCNC: 4 MMOL/L (ref 3.5–5.2)
PROT SERPL-MCNC: 5.4 G/DL (ref 6–8.5)
RBC # BLD AUTO: 4.53 10*6/MM3 (ref 3.77–5.28)
SODIUM SERPL-SCNC: 136 MMOL/L (ref 136–145)
URATE SERPL-MCNC: 6.9 MG/DL (ref 2.4–5.7)
WBC NRBC COR # BLD AUTO: 12.62 10*3/MM3 (ref 3.4–10.8)

## 2024-01-16 PROCEDURE — 25010000002 LABETALOL 5 MG/ML SOLUTION: Performed by: OBSTETRICS & GYNECOLOGY

## 2024-01-16 PROCEDURE — 59025 FETAL NON-STRESS TEST: CPT | Performed by: OBSTETRICS & GYNECOLOGY

## 2024-01-16 PROCEDURE — 83615 LACTATE (LD) (LDH) ENZYME: CPT | Performed by: OBSTETRICS & GYNECOLOGY

## 2024-01-16 PROCEDURE — 80053 COMPREHEN METABOLIC PANEL: CPT | Performed by: OBSTETRICS & GYNECOLOGY

## 2024-01-16 PROCEDURE — 82948 REAGENT STRIP/BLOOD GLUCOSE: CPT

## 2024-01-16 PROCEDURE — 63710000001 INSULIN ISOPHANE HUMAN PER 5 UNITS: Performed by: OBSTETRICS & GYNECOLOGY

## 2024-01-16 PROCEDURE — 85025 COMPLETE CBC W/AUTO DIFF WBC: CPT | Performed by: OBSTETRICS & GYNECOLOGY

## 2024-01-16 PROCEDURE — 63710000001 INSULIN REGULAR HUMAN PER 5 UNITS: Performed by: OBSTETRICS & GYNECOLOGY

## 2024-01-16 PROCEDURE — 84550 ASSAY OF BLOOD/URIC ACID: CPT | Performed by: OBSTETRICS & GYNECOLOGY

## 2024-01-16 PROCEDURE — 99232 SBSQ HOSP IP/OBS MODERATE 35: CPT | Performed by: OBSTETRICS & GYNECOLOGY

## 2024-01-16 PROCEDURE — 76819 FETAL BIOPHYS PROFIL W/O NST: CPT | Performed by: OBSTETRICS & GYNECOLOGY

## 2024-01-16 PROCEDURE — 76819 FETAL BIOPHYS PROFIL W/O NST: CPT

## 2024-01-16 PROCEDURE — 59025 FETAL NON-STRESS TEST: CPT

## 2024-01-16 RX ORDER — LABETALOL HYDROCHLORIDE 5 MG/ML
20-80 INJECTION, SOLUTION INTRAVENOUS
Status: DISCONTINUED | OUTPATIENT
Start: 2024-01-16 | End: 2024-01-21 | Stop reason: HOSPADM

## 2024-01-16 RX ORDER — HYDRALAZINE HYDROCHLORIDE 20 MG/ML
5-10 INJECTION INTRAMUSCULAR; INTRAVENOUS
Status: DISCONTINUED | OUTPATIENT
Start: 2024-01-16 | End: 2024-01-21 | Stop reason: HOSPADM

## 2024-01-16 RX ORDER — NIFEDIPINE 10 MG/1
10-20 CAPSULE ORAL
Status: DISCONTINUED | OUTPATIENT
Start: 2024-01-16 | End: 2024-01-21 | Stop reason: HOSPADM

## 2024-01-16 RX ADMIN — SODIUM CHLORIDE, PRESERVATIVE FREE 10 ML: 5 INJECTION INTRAVENOUS at 21:06

## 2024-01-16 RX ADMIN — SODIUM CHLORIDE, PRESERVATIVE FREE 10 ML: 5 INJECTION INTRAVENOUS at 07:34

## 2024-01-16 RX ADMIN — INSULIN HUMAN 24 UNITS: 100 INJECTION, SOLUTION PARENTERAL at 07:33

## 2024-01-16 RX ADMIN — INSULIN HUMAN 12 UNITS: 100 INJECTION, SUSPENSION SUBCUTANEOUS at 06:56

## 2024-01-16 RX ADMIN — LORATADINE 10 MG: 10 TABLET ORAL at 07:34

## 2024-01-16 RX ADMIN — LABETALOL HYDROCHLORIDE 20 MG: 5 INJECTION, SOLUTION INTRAVENOUS at 09:30

## 2024-01-16 RX ADMIN — MONTELUKAST SODIUM 10 MG: 10 TABLET, FILM COATED ORAL at 21:05

## 2024-01-16 RX ADMIN — INSULIN HUMAN 22 UNITS: 100 INJECTION, SUSPENSION SUBCUTANEOUS at 22:15

## 2024-01-16 RX ADMIN — INSULIN HUMAN 8 UNITS: 100 INJECTION, SOLUTION PARENTERAL at 18:05

## 2024-01-16 RX ADMIN — Medication 1 TABLET: at 07:34

## 2024-01-16 NOTE — PLAN OF CARE
Goal Outcome Evaluation:  Plan of Care Reviewed With: patient        Progress: declining  Outcome Evaluation: RNST, +FM, VSS and afebrile. BP's elevated tonight. one BP reading in severe range the others were not. Patient denied ever having any headache or visual problems or epigastric pain. Abdomen palpated soft. Denied feeling any contractions or vaginal or rectal pressure. Denied leaking of fluid. Patient had stated that she walked  more than usual today. pt. had two 30 minute walk sessions on our unit and was visited by family with the lights on bright. These factors could have contributed to the increased blood pressures. Patient slept well overnight.

## 2024-01-16 NOTE — PROGRESS NOTES
MATERNAL FETAL MEDICINE Consult Note    Dear Dr Paloma Rodriguez MD:    Thank you for your kind referral of Desi Espinoza.  As you know, she is a 28 y.o.   at  33 3/7 weeks gestation (Estimated Date of Delivery: 3/2/24). This is a consult.      Her antepartum course is complicated by:  Preeclampsia now WITH SF  GDMA2 on insulin    Aneuploidy Screening: low risk    HPI: Today, she denies headache, blurry vision, RUQ pain. No vaginal bleeding, no contractions.     Review of History:  Past Medical History:   Diagnosis Date    Gestational diabetes     Gestational hypertension     Preeclampsia 2024    Pregnancy 2024    Seasonal allergies      Past Surgical History:   Procedure Laterality Date    NO PAST SURGERIES           Social History     Socioeconomic History    Marital status:    Tobacco Use    Smoking status: Never    Smokeless tobacco: Never   Vaping Use    Vaping Use: Never used   Substance and Sexual Activity    Alcohol use: Not Currently     Comment: states once a month    Drug use: No    Sexual activity: Yes     Partners: Male     Birth control/protection: None     Comment: Dong     Family History   Problem Relation Age of Onset    Diabetes Father     Diabetes Mother     Breast cancer Mother     Polycystic ovary syndrome Sister     Congenital heart disease Neg Hx       No Known Allergies   No current facility-administered medications on file prior to encounter.     Current Outpatient Medications on File Prior to Encounter   Medication Sig Dispense Refill    Blood Glucose Monitoring Suppl (Accu-Chek Guide Me) w/Device kit       Glucose Blood (Blood Glucose Test) strip Use to check glucose seven times daily and as needed. 300 each 11    glucose monitor monitoring kit Use four times daily to check blood glucose. 1 each 0    insulin aspart (novoLOG FLEXPEN) 100 UNIT/ML solution pen-injector sc pen Inject subcutaneously 25 units with breakfast and 20 units with dinner. 16 mL 11    insulin  NPH (humuLIN N,novoLIN N) 100 UNIT/ML injection Inject subcutaneously 52 units in the morning and 20 units at bedtime. 8 each 12    Insulin Pen Needle (B-D UF III MINI PEN NEEDLES) 31G X 5 MM misc Use four times daily to inject insulin. 200 each 11    Lancets misc Use to check glucose seven times daily and as needed. 300 each 11    montelukast (SINGULAIR) 10 MG tablet Take 1 tablet by mouth Daily. 90 tablet 3    NovoLIN N FlexPen 100 UNIT/ML injection       omeprazole (priLOSEC) 40 MG capsule Take 1 capsule by mouth Daily. 90 capsule 3    ondansetron (Zofran) 4 MG tablet Take 1 tablet by mouth Every 6 (Six) Hours As Needed for Nausea or Vomiting. 30 tablet 1    prenatal vitamin (prenatal, CLASSIC, vitamin) tablet Take  by mouth Daily.      Glucagon 0.5 MG/0.1ML solution auto-injector Inject 0.1 mL under the skin into the appropriate area as directed 1 (One) Time As Needed (severe hypoglycemia) for up to 1 dose. 0.1 mL 1    Loratadine-Pseudoephedrine (CLARITIN-D 24 HOUR PO) Take  by mouth.      nystatin (MYCOSTATIN) 050540 UNIT/GM powder Apply  topically to the appropriate area as directed 3 (Three) Times a Day. 30 g 2        Past obstetric, gynecological, medical, surgical, family and social history reviewed.  Relevant lab work and imaging reviewed.    Review of systems  Constitutional:  denies fever, chills, malaise.   ENT/Mouth:  denies sore throat, tinnitus  Eyes: denies vision changes/pain  CV:  denies chest pain  Respiratory:  denies cough/SOB  GI:  denies N/V, diarrhea, abdominal pain.    :   denies dysuria  Skin:  denies lesions or pruritus   Neuro:  denies weakness, focal neurologic symptoms    Vitals:    01/16/24 1011 01/16/24 1012 01/16/24 1021 01/16/24 1022   BP:  147/74  159/77   BP Location:    Left arm   Patient Position:    Lying   Pulse: 74 71 77 70   Resp:  16  16   Temp:       TempSrc:       SpO2: 98% 98% 98%    Weight:       Height:           PHYSICAL EXAM   GENERAL: Not in acute distress,  AAOx3, pleasant  CARDIO: regular rate and rhythm  PULM: symmetric chest rise, speaking in complete sentences without difficulty  NEURO: awake, alert and oriented to person, place, and time  ABDOMINAL: No fundal tenderness, no rebound or guarding, gravid  EXTREMITIES: no bilateral lower extremity edema/tenderness  SKIN: Warm, well-perfused      ULTRASOUND   Please view full ultrasound note on Imaging tab in [employer].      ASSESSMENT/COUNSELIN y.o.   at  33 3/7 weeks gestation (Estimated Date of Delivery: 3/2/24).     -Pregnancy  [ X ] stable  [   ] improving [  ] worsening    There are no diagnoses linked to this encounter.     Pre-eclampsia, now WITH SF based on Bps:  Previously counseled.  Now WITH SF based on BP's requiring treatment.       She is no longer a candidate for outpatient management and I recommend delivery by 34 weeks unless indicated sooner by   worsening lab values that indicate severe pre-eclampsia (thrombocytopenia <100, twice normal LFT's, Cr >1.1), pulmonary edema, certainly eclampsia, severe BP unable to be controled with IV medications, or HA unresponsive to medications, or fetal distress.      Her severe diagnosis happened after I saw her this morning, but discussed with Dr. Fuller--appreciate her care.      GDMA2:  On NPH 10/22 and regular insulin --decreased long acting today a little due to lows.      Check glucose 7x per day (fasting, preprandial, 1 hour postprandial, HS)         Plan  -BID NST as long as fetal status reassuring  -S/p steroids.   -Recommend PIH labs at least 2-3x per week  -Would recommend inpatient management now that had a sustained SR requiring treatment/protocol.    -Will monitor sugars  -US while inpatient /Fri  -Right now delivery indicated at 34 weeks.  Low threshold for worsening preeclampsia sooner.       Will continue to follow along.  Please call with any questions.       Thank you for the consult and opportunity to care for this patient.   Please feel free to reach out with any questions or concerns.      I spent 30 minutes caring for this patient on this date of service. This time includes time spent by me in the following activities: preparing for the visit, reviewing tests, obtaining and/or reviewing a separately obtained history, performing a medically appropriate examination and/or evaluation, counseling and educating the patient/family/caregiver and independently interpreting results and communicating that information with the patient/family/caregiver with greater than 50% spent in counseling and coordination of care.       I spent 3 minutes on the separately reported service of US imaging not included in the time used to support the E/M service also reported today.      María Mcnair MD FACOG  Maternal Fetal Medicine-[facility]  Office: [phone]  [email or web address]

## 2024-01-16 NOTE — PROGRESS NOTES
"[facility]  Obstetric Progress Note    Chief Complaint   Patient presents with    Elevated Blood Pressure     Pt sent from office for bp check and labs       Subjective     Patient:    The patient feels well. No headache. No contractions.  Pt is anxious as this morning some severe range BPs requiring hypertensive protocol.      Review of Systems - ROS:  No fever or chills, no nausea or vomiting, no contractions, no leg pain, no LE edema, no leaking fluid, no bleeding, no headache, no dysuria        Objective     Vital Signs Range for the last 24 hours  Temp:  [97.7 °F (36.5 °C)-98.3 °F (36.8 °C)] 98.3 °F (36.8 °C)   Temp src: Oral   BP: (142-173)/() 144/74   Heart Rate:  [53-86] 66   Resp:  [16] 16   SpO2:  [97 %-98 %] 97 %       Device (Oxygen Therapy): room air           Flowsheet Rows      Flowsheet Row First Filed Value   Admission Height 162.6 cm (64\") Documented at 01/11/2024 1646   Admission Weight 119 kg (263 lb) Documented at 01/11/2024 1818            Intake/Output last 24 hours:    No intake or output data in the 24 hours ending 01/16/24 1204    Intake/Output this shift:    No intake/output data recorded.    Physical Exam:  General: Patient is comfortable, well appearing, and in no acute distress   Heart CVS exam: normal rate and regular rhythm.   Lungs Chest: no tachypnea, retractions or cyanosis.     Abdomen Abdominal exam: gravid, non tender.   Extremities Exam of extremities: pedal edema 1 +     Presentation: Vertex by US today   Cervix: Exam by:     Dilation:     Effacement:     Station:           Fetal Heart Rate Assessment   Method: Fetal HR Assessment Method: external   Beats/min: Fetal HR (beats/min): 145   Baseline: Fetal HR Baseline: normal range   Varibility: Fetal HR Variability: moderate (amplitude range 6 to 25 bpm)   Accels: Fetal HR Accelerations: lasting at least 15 seconds, greater than/equal to 15 bpm   Decels: Fetal HR Decelerations: absent   Tracing Category:       Uterine " Assessment   Method: Method: palpation, per patient report, external tocotransducer   Frequency (min): Contraction Frequency (Minutes): x1   Ctx Count in 10 min:     Duration:     Intensity: Contraction Intensity: no contractions   Intensity by IUPC:     Resting Tone: Uterine Resting Tone: soft by palpation   Resting Tone by IUPC:     [place] Units:       Lab Results (last 24 hours)       Procedure Component Value Units Date/Time    POC Glucose Once [[id number]]  (Abnormal) Collected: 01/16/24 1154    Specimen: Blood Updated: 01/16/24 1156     Glucose 54 mg/dL     Comprehensive Metabolic Panel [[id number]]  (Abnormal) Collected: 01/16/24 1020    Specimen: Blood Updated: 01/16/24 1100     Glucose 71 mg/dL      BUN 9 mg/dL      Creatinine 0.64 mg/dL      Sodium 136 mmol/L      Potassium 4.0 mmol/L      Chloride 105 mmol/L      CO2 21.0 mmol/L      Calcium 9.2 mg/dL      Total Protein 5.4 g/dL      Albumin 3.3 g/dL      ALT (SGPT) 21 U/L      AST (SGOT) 16 U/L      Alkaline Phosphatase 97 U/L      Total Bilirubin 0.2 mg/dL      Globulin 2.1 gm/dL      A/G Ratio 1.6 g/dL      BUN/Creatinine Ratio 14.1     Anion Gap 10.0 mmol/L      eGFR 122.1 mL/min/1.73     Narrative:      GFR Normal >60  Chronic Kidney Disease <60  Kidney Failure <15      Lactate Dehydrogenase [[id number]]  (Normal) Collected: 01/16/24 1020    Specimen: Blood Updated: 01/16/24 1100      U/L     Uric Acid [[id number]]  (Abnormal) Collected: 01/16/24 1020    Specimen: Blood Updated: 01/16/24 1100     Uric Acid 6.9 mg/dL     CBC & Differential [[id number]]  (Abnormal) Collected: 01/16/24 1020    Specimen: Blood Updated: 01/16/24 1041    Narrative:      The following orders were created for panel order CBC & Differential.  Procedure                               Abnormality         Status                     ---------                               -----------         ------                     CBC Auto Differential[[id number]]        Abnormal             Final result                 Please view results for these tests on the individual orders.    CBC Auto Differential [[id number]]  (Abnormal) Collected: 01/16/24 1020    Specimen: Blood Updated: 01/16/24 1041     WBC 12.62 10*3/mm3      RBC 4.53 10*6/mm3      Hemoglobin 12.8 g/dL      Hematocrit 38.9 %      MCV 85.9 fL      MCH 28.3 pg      MCHC 32.9 g/dL      RDW 12.0 %      RDW-SD 37.6 fl      MPV 11.3 fL      Platelets 238 10*3/mm3      Neutrophil % 63.8 %      Lymphocyte % 26.5 %      Monocyte % 8.7 %      Eosinophil % 0.2 %      Basophil % 0.2 %      Immature Grans % 0.6 %      Neutrophils, Absolute 8.05 10*3/mm3      Lymphocytes, Absolute 3.34 10*3/mm3      Monocytes, Absolute 1.10 10*3/mm3      Eosinophils, Absolute 0.03 10*3/mm3      Basophils, Absolute 0.03 10*3/mm3      Immature Grans, Absolute 0.07 10*3/mm3      nRBC 0.0 /100 WBC     POC Glucose Once [[id number]]  (Normal) Collected: 01/16/24 0939    Specimen: Blood Updated: 01/16/24 0940     Glucose 72 mg/dL     POC Glucose Once [[id number]]  (Abnormal) Collected: 01/16/24 0651    Specimen: Blood Updated: 01/16/24 0652     Glucose 60 mg/dL     POC Glucose Once [[id number]]  (Normal) Collected: 01/15/24 2133    Specimen: Blood Updated: 01/15/24 2134     Glucose 80 mg/dL     POC Glucose Once [[id number]]  (Abnormal) Collected: 01/15/24 1933    Specimen: Blood Updated: 01/15/24 1934     Glucose 143 mg/dL     POC Glucose Once [[id number]]  (Abnormal) Collected: 01/15/24 1805    Specimen: Blood Updated: 01/15/24 1806     Glucose 65 mg/dL     POC Glucose Once [[id number]]  (Normal) Collected: 01/15/24 1331    Specimen: Blood Updated: 01/15/24 1332     Glucose 85 mg/dL     POC Glucose Once [[id number]]  (Abnormal) Collected: 01/15/24 1203    Specimen: Blood Updated: 01/15/24 1204     Glucose 67 mg/dL                 Assessment & Plan       Pregnancy    33 weeks gestation of pregnancy    Rh negative status during pregnancy in first trimester    Group B streptococcal  bacteriuria    Preeclampsia    Insulin controlled gestational diabetes mellitus (GDM) in third trimester    Breech presentation        Assessment:  1.  Intrauterine pregnancy at 33w3d weeks gestation with reactive fetal status.    2.  pre-eclampsia severe- severe range BP but responsive to protocol, no headache.   3. Gestational diabetes controlled by insulin, decent control  4.  GBS status: positive by urine culture this pregnancy    Plan:  1. fetal and uterine monitoring  intermittent and maternal labs  Delivery at 34 weeks, sooner if worsening hypertension or becomes symptomatic  2. Plan of care has been reviewed with patient and family, MFM consult appreciated-   3. Continue careful hospital management.         Mony Stock MD 1/16/2024 12:04 EST

## 2024-01-16 NOTE — PLAN OF CARE
Goal Outcome Evaluation:           Progress: improving  Outcome Evaluation: Elevated BPs in AM, treated w/ protocol and now VSS and afebrile. RNST this afternoon. Pt denies Pre-E symptoms. Pt visited by family and educated on relaxing and taking it easy for BPs. Pt denies CTX, VB, and LOF. Pt verbalizes +FM. BG WNL at this time. Will check BG and deliver dinner insulin when pt is ready.

## 2024-01-16 NOTE — NURSING NOTE
Pt. Ate a bowl of rice krispies with about 2 oz of milk for a snack tonight. This is approximately 24 grams of sugar. Patient's blood sugars have run on the lower side all day today. Patient was given 25 units of her long acting insulin tonight and was instructed to inform nurse for any signs or symptoms of low blood sugar. Patient verbalized understanding and agreed to do so.

## 2024-01-16 NOTE — NON STRESS TEST
Desi АЛЕКСАНДР Alexis, a  at 33w2d with an ARACELY of 3/2/2024, by Last Menstrual Period, was seen at [facility]TE[facility] for a nonstress test.    Chief Complaint   Patient presents with    Elevated Blood Pressure     Pt sent from office for bp check and labs       Patient Active Problem List   Diagnosis    33 weeks gestation of pregnancy    Rh negative status during pregnancy in first trimester    Group B streptococcal bacteriuria    Environmental allergies    Pregnancy    Preeclampsia    Insulin controlled gestational diabetes mellitus (GDM) in third trimester    Breech presentation       Start Time:   Stop Time:     Interpretation A  Nonstress Test Interpretation A: Reactive      REACTIVE

## 2024-01-16 NOTE — NON STRESS TEST
Desi Espinoza, a  at 33w3d with an ARACELY of 3/2/2024, by Last Menstrual Period, was seen at [facility] for a nonstress test.    Chief Complaint   Patient presents with    Elevated Blood Pressure     Pt sent from office for bp check and labs       Patient Active Problem List   Diagnosis    33 weeks gestation of pregnancy    Rh negative status during pregnancy in first trimester    Group B streptococcal bacteriuria    Environmental allergies    Pregnancy    Preeclampsia    Insulin controlled gestational diabetes mellitus (GDM) in third trimester    Breech presentation       Start Time: 932  Stop Time: 1208    Interpretation A  Nonstress Test Interpretation A: Reactive           NST was reviewed:    Patient Active Problem List   Diagnosis    33 weeks gestation of pregnancy    Rh negative status during pregnancy in first trimester    Group B streptococcal bacteriuria    Environmental allergies    Pregnancy    Preeclampsia    Insulin controlled gestational diabetes mellitus (GDM) in third trimester    Breech presentation         33w3d    Indication for preeclampsia    NST is reassuring, Cat 1 tracing.      Recommendation:  continue monitoring while inpatient    Reviewed by Mony Fuller MD    16:56 EST

## 2024-01-16 NOTE — NURSING NOTE
Fasting BS=60. Patient given 4oz apple juice and given her long acting insulin this morning. Breakfast should be served within 30 minutes to an hour from now. Oncoming nurse informed that glucometer at bedside and if greater than 45 minutes from last BS result, a new BS reading will be obtained.

## 2024-01-17 ENCOUNTER — ANESTHESIA (OUTPATIENT)
Dept: LABOR AND DELIVERY | Facility: HOSPITAL | Age: 29
End: 2024-01-17
Payer: COMMERCIAL

## 2024-01-17 ENCOUNTER — ANESTHESIA EVENT (OUTPATIENT)
Dept: LABOR AND DELIVERY | Facility: HOSPITAL | Age: 29
End: 2024-01-17
Payer: COMMERCIAL

## 2024-01-17 LAB
ABO GROUP BLD: NORMAL
ABO GROUP BLD: NORMAL
ALBUMIN SERPL-MCNC: 3.2 G/DL (ref 3.5–5.2)
ALBUMIN/GLOB SERPL: 1.3 G/DL
ALP SERPL-CCNC: 98 U/L (ref 39–117)
ALT SERPL W P-5'-P-CCNC: 17 U/L (ref 1–33)
ANION GAP SERPL CALCULATED.3IONS-SCNC: 12.6 MMOL/L (ref 5–15)
AST SERPL-CCNC: 14 U/L (ref 1–32)
ATMOSPHERIC PRESS: 752.1 MMHG
ATMOSPHERIC PRESS: 753.6 MMHG
BASE EXCESS BLDCOA CALC-SCNC: -4.6 MMOL/L (ref -2–2)
BASE EXCESS BLDCOV CALC-SCNC: -4.4 MMOL/L (ref -30–30)
BASOPHILS # BLD AUTO: 0.02 10*3/MM3 (ref 0–0.2)
BASOPHILS NFR BLD AUTO: 0.1 % (ref 0–1.5)
BDY SITE: ABNORMAL
BDY SITE: ABNORMAL
BILIRUB SERPL-MCNC: <0.2 MG/DL (ref 0–1.2)
BLD GP AB SCN SERPL QL: POSITIVE
BUN SERPL-MCNC: 8 MG/DL (ref 6–20)
BUN/CREAT SERPL: 15.1 (ref 7–25)
CALCIUM SPEC-SCNC: 8.2 MG/DL (ref 8.6–10.5)
CHLORIDE SERPL-SCNC: 103 MMOL/L (ref 98–107)
CO2 BLDA-SCNC: 27.4 MMOL/L (ref 23–27)
CO2 BLDA-SCNC: 28.1 MMOL/L (ref 23–27)
CO2 SERPL-SCNC: 17.4 MMOL/L (ref 22–29)
CREAT SERPL-MCNC: 0.53 MG/DL (ref 0.57–1)
DEPRECATED RDW RBC AUTO: 41.3 FL (ref 37–54)
EGFRCR SERPLBLD CKD-EPI 2021: 127.8 ML/MIN/1.73
EOSINOPHIL # BLD AUTO: 0.01 10*3/MM3 (ref 0–0.4)
EOSINOPHIL NFR BLD AUTO: 0.1 % (ref 0.3–6.2)
ERYTHROCYTE [DISTWIDTH] IN BLOOD BY AUTOMATED COUNT: 12.7 % (ref 12.3–15.4)
EXPIRATION DATE: ABNORMAL
FETAL BLEED: NEGATIVE
GLOBULIN UR ELPH-MCNC: 2.4 GM/DL
GLUCOSE BLDC GLUCOMTR-MCNC: 110 MG/DL (ref 70–130)
GLUCOSE BLDC GLUCOMTR-MCNC: 81 MG/DL (ref 70–130)
GLUCOSE BLDC GLUCOMTR-MCNC: 86 MG/DL (ref 70–130)
GLUCOSE SERPL-MCNC: 90 MG/DL (ref 65–99)
HCO3 BLDCOA-SCNC: 26 MMOL/L (ref 22–28)
HCO3 BLDCOV-SCNC: 25.4 MMOL/L
HCT VFR BLD AUTO: 39.1 % (ref 34–46.6)
HGB BLD-MCNC: 13.3 G/DL (ref 12–15.9)
IMM GRANULOCYTES # BLD AUTO: 0.08 10*3/MM3 (ref 0–0.05)
IMM GRANULOCYTES NFR BLD AUTO: 0.6 % (ref 0–0.5)
LDH SERPL-CCNC: 212 U/L (ref 135–214)
LYMPHOCYTES # BLD AUTO: 2.36 10*3/MM3 (ref 0.7–3.1)
LYMPHOCYTES NFR BLD AUTO: 16.4 % (ref 19.6–45.3)
Lab: ABNORMAL
MCH RBC QN AUTO: 30 PG (ref 26.6–33)
MCHC RBC AUTO-ENTMCNC: 34 G/DL (ref 31.5–35.7)
MCV RBC AUTO: 88.1 FL (ref 79–97)
MODALITY: ABNORMAL
MODALITY: ABNORMAL
MONOCYTES # BLD AUTO: 0.81 10*3/MM3 (ref 0.1–0.9)
MONOCYTES NFR BLD AUTO: 5.6 % (ref 5–12)
NEUTROPHILS NFR BLD AUTO: 11.1 10*3/MM3 (ref 1.7–7)
NEUTROPHILS NFR BLD AUTO: 77.2 % (ref 42.7–76)
NRBC BLD AUTO-RTO: 0 /100 WBC (ref 0–0.2)
NUMBER OF DOSES: NORMAL
PCO2 BLDCOA: 68.4 MMHG (ref 43–63)
PCO2 BLDCOV: 63.4 MM HG (ref 35–51.3)
PH BLDCOA: 7.19 PH UNITS (ref 7.18–7.34)
PH BLDCOV: 7.21 PH UNITS (ref 7.26–7.4)
PLATELET # BLD AUTO: 232 10*3/MM3 (ref 140–450)
PMV BLD AUTO: 11.6 FL (ref 6–12)
PO2 BLDCOA: <18.1 MMHG (ref 12–26)
PO2 BLDCOV: <18.1 MM HG (ref 19–39)
POTASSIUM SERPL-SCNC: 4 MMOL/L (ref 3.5–5.2)
PROT SERPL-MCNC: 5.6 G/DL (ref 6–8.5)
PROT UR STRIP-MCNC: ABNORMAL MG/DL
RBC # BLD AUTO: 4.44 10*6/MM3 (ref 3.77–5.28)
RESIDUAL RHIG DETECTED: NORMAL
RH BLD: NEGATIVE
RH BLD: NEGATIVE
SAO2 % BLDCOV: ABNORMAL %
SODIUM SERPL-SCNC: 133 MMOL/L (ref 136–145)
T PALLIDUM IGG SER QL: NORMAL
T&S EXPIRATION DATE: NORMAL
URATE SERPL-MCNC: 7 MG/DL (ref 2.4–5.7)
WBC NRBC COR # BLD AUTO: 14.38 10*3/MM3 (ref 3.4–10.8)

## 2024-01-17 PROCEDURE — 88307 TISSUE EXAM BY PATHOLOGIST: CPT

## 2024-01-17 PROCEDURE — 25010000002 CLONIDINE PER 1 MG: Performed by: OBSTETRICS & GYNECOLOGY

## 2024-01-17 PROCEDURE — 25010000002 MAGNESIUM SULFATE 20 GM/500ML SOLUTION: Performed by: OBSTETRICS & GYNECOLOGY

## 2024-01-17 PROCEDURE — 84550 ASSAY OF BLOOD/URIC ACID: CPT | Performed by: OBSTETRICS & GYNECOLOGY

## 2024-01-17 PROCEDURE — 25010000002 RHO D IMMUNE GLOBULIN 1500 UNIT/2ML SOLUTION PREFILLED SYRINGE: Performed by: OBSTETRICS & GYNECOLOGY

## 2024-01-17 PROCEDURE — 86900 BLOOD TYPING SEROLOGIC ABO: CPT | Performed by: OBSTETRICS & GYNECOLOGY

## 2024-01-17 PROCEDURE — 86850 RBC ANTIBODY SCREEN: CPT | Performed by: OBSTETRICS & GYNECOLOGY

## 2024-01-17 PROCEDURE — 82948 REAGENT STRIP/BLOOD GLUCOSE: CPT

## 2024-01-17 PROCEDURE — 25010000002 MORPHINE PER 10 MG: Performed by: STUDENT IN AN ORGANIZED HEALTH CARE EDUCATION/TRAINING PROGRAM

## 2024-01-17 PROCEDURE — 85025 COMPLETE CBC W/AUTO DIFF WBC: CPT | Performed by: OBSTETRICS & GYNECOLOGY

## 2024-01-17 PROCEDURE — 25010000002 FENTANYL CITRATE (PF) 50 MCG/ML SOLUTION: Performed by: STUDENT IN AN ORGANIZED HEALTH CARE EDUCATION/TRAINING PROGRAM

## 2024-01-17 PROCEDURE — 83615 LACTATE (LD) (LDH) ENZYME: CPT | Performed by: OBSTETRICS & GYNECOLOGY

## 2024-01-17 PROCEDURE — 81002 URINALYSIS NONAUTO W/O SCOPE: CPT | Performed by: OBSTETRICS & GYNECOLOGY

## 2024-01-17 PROCEDURE — 82803 BLOOD GASES ANY COMBINATION: CPT | Performed by: OBSTETRICS & GYNECOLOGY

## 2024-01-17 PROCEDURE — 80053 COMPREHEN METABOLIC PANEL: CPT | Performed by: OBSTETRICS & GYNECOLOGY

## 2024-01-17 PROCEDURE — 25010000002 CEFAZOLIN IN DEXTROSE 2-4 GM/100ML-% SOLUTION: Performed by: OBSTETRICS & GYNECOLOGY

## 2024-01-17 PROCEDURE — 82803 BLOOD GASES ANY COMBINATION: CPT

## 2024-01-17 PROCEDURE — 86870 RBC ANTIBODY IDENTIFICATION: CPT | Performed by: OBSTETRICS & GYNECOLOGY

## 2024-01-17 PROCEDURE — 25010000002 DIPHENHYDRAMINE PER 50 MG: Performed by: OBSTETRICS & GYNECOLOGY

## 2024-01-17 PROCEDURE — 25010000002 EPINEPHRINE 1 MG/ML SOLUTION 30 ML VIAL: Performed by: OBSTETRICS & GYNECOLOGY

## 2024-01-17 PROCEDURE — 25010000002 HYDRALAZINE PER 20 MG: Performed by: OBSTETRICS & GYNECOLOGY

## 2024-01-17 PROCEDURE — 25010000002 ONDANSETRON PER 1 MG: Performed by: OBSTETRICS & GYNECOLOGY

## 2024-01-17 PROCEDURE — 25010000002 DIPHENHYDRAMINE PER 50 MG: Performed by: STUDENT IN AN ORGANIZED HEALTH CARE EDUCATION/TRAINING PROGRAM

## 2024-01-17 PROCEDURE — 25010000002 LABETALOL 5 MG/ML SOLUTION: Performed by: OBSTETRICS & GYNECOLOGY

## 2024-01-17 PROCEDURE — 25810000003 LACTATED RINGERS PER 1000 ML: Performed by: OBSTETRICS & GYNECOLOGY

## 2024-01-17 PROCEDURE — 59510 CESAREAN DELIVERY: CPT | Performed by: OBSTETRICS & GYNECOLOGY

## 2024-01-17 PROCEDURE — 25010000002 ROPIVACAINE PER 1 MG: Performed by: OBSTETRICS & GYNECOLOGY

## 2024-01-17 PROCEDURE — 86901 BLOOD TYPING SEROLOGIC RH(D): CPT | Performed by: OBSTETRICS & GYNECOLOGY

## 2024-01-17 PROCEDURE — 86780 TREPONEMA PALLIDUM: CPT | Performed by: OBSTETRICS & GYNECOLOGY

## 2024-01-17 PROCEDURE — 25010000002 BUPIVACAINE PF 0.75 % SOLUTION: Performed by: STUDENT IN AN ORGANIZED HEALTH CARE EDUCATION/TRAINING PROGRAM

## 2024-01-17 PROCEDURE — 25010000002 ONDANSETRON PER 1 MG: Performed by: STUDENT IN AN ORGANIZED HEALTH CARE EDUCATION/TRAINING PROGRAM

## 2024-01-17 PROCEDURE — 85461 HEMOGLOBIN FETAL: CPT | Performed by: OBSTETRICS & GYNECOLOGY

## 2024-01-17 RX ORDER — HYDROMORPHONE HYDROCHLORIDE 1 MG/ML
0.5 INJECTION, SOLUTION INTRAMUSCULAR; INTRAVENOUS; SUBCUTANEOUS
Status: ACTIVE | OUTPATIENT
Start: 2024-01-17 | End: 2024-01-17

## 2024-01-17 RX ORDER — KETOROLAC TROMETHAMINE 30 MG/ML
30 INJECTION, SOLUTION INTRAMUSCULAR; INTRAVENOUS ONCE
Status: DISCONTINUED | OUTPATIENT
Start: 2024-01-17 | End: 2024-01-18

## 2024-01-17 RX ORDER — MAGNESIUM SULFATE HEPTAHYDRATE 40 MG/ML
INJECTION, SOLUTION INTRAVENOUS
Status: COMPLETED
Start: 2024-01-17 | End: 2024-01-17

## 2024-01-17 RX ORDER — MAGNESIUM SULFATE HEPTAHYDRATE 40 MG/ML
2 INJECTION, SOLUTION INTRAVENOUS CONTINUOUS
Status: DISPENSED | OUTPATIENT
Start: 2024-01-17 | End: 2024-01-18

## 2024-01-17 RX ORDER — TRANEXAMIC ACID 10 MG/ML
INJECTION, SOLUTION INTRAVENOUS
Status: ACTIVE
Start: 2024-01-17 | End: 2024-01-18

## 2024-01-17 RX ORDER — OXYTOCIN/0.9 % SODIUM CHLORIDE 30/500 ML
999 PLASTIC BAG, INJECTION (ML) INTRAVENOUS ONCE
Status: COMPLETED | OUTPATIENT
Start: 2024-01-17 | End: 2024-01-17

## 2024-01-17 RX ORDER — MORPHINE SULFATE 1 MG/ML
INJECTION, SOLUTION EPIDURAL; INTRATHECAL; INTRAVENOUS AS NEEDED
Status: DISCONTINUED | OUTPATIENT
Start: 2024-01-17 | End: 2024-01-17 | Stop reason: SURG

## 2024-01-17 RX ORDER — CITRIC ACID/SODIUM CITRATE 334-500MG
30 SOLUTION, ORAL ORAL ONCE
Status: COMPLETED | OUTPATIENT
Start: 2024-01-17 | End: 2024-01-17

## 2024-01-17 RX ORDER — MORPHINE SULFATE 2 MG/ML
2 INJECTION, SOLUTION INTRAMUSCULAR; INTRAVENOUS
Status: ACTIVE | OUTPATIENT
Start: 2024-01-17 | End: 2024-01-17

## 2024-01-17 RX ORDER — MISOPROSTOL 200 UG/1
TABLET ORAL
Status: ACTIVE
Start: 2024-01-17 | End: 2024-01-18

## 2024-01-17 RX ORDER — OXYTOCIN/0.9 % SODIUM CHLORIDE 30/500 ML
125 PLASTIC BAG, INJECTION (ML) INTRAVENOUS CONTINUOUS PRN
Status: DISCONTINUED | OUTPATIENT
Start: 2024-01-17 | End: 2024-01-17

## 2024-01-17 RX ORDER — ACETAMINOPHEN 325 MG/1
650 TABLET ORAL EVERY 6 HOURS
Status: DISCONTINUED | OUTPATIENT
Start: 2024-01-19 | End: 2024-01-21 | Stop reason: HOSPADM

## 2024-01-17 RX ORDER — DIPHENHYDRAMINE HYDROCHLORIDE 50 MG/ML
25 INJECTION INTRAMUSCULAR; INTRAVENOUS EVERY 4 HOURS PRN
Status: DISCONTINUED | OUTPATIENT
Start: 2024-01-17 | End: 2024-01-18

## 2024-01-17 RX ORDER — SODIUM CHLORIDE 0.9 % (FLUSH) 0.9 %
10 SYRINGE (ML) INJECTION AS NEEDED
Status: DISCONTINUED | OUTPATIENT
Start: 2024-01-17 | End: 2024-01-18

## 2024-01-17 RX ORDER — OXYTOCIN/0.9 % SODIUM CHLORIDE 30/500 ML
75 PLASTIC BAG, INJECTION (ML) INTRAVENOUS CONTINUOUS PRN
Status: COMPLETED | OUTPATIENT
Start: 2024-01-17 | End: 2024-01-17

## 2024-01-17 RX ORDER — PHENYLEPHRINE HCL IN 0.9% NACL 1 MG/10 ML
SYRINGE (ML) INTRAVENOUS AS NEEDED
Status: DISCONTINUED | OUTPATIENT
Start: 2024-01-17 | End: 2024-01-17 | Stop reason: SURG

## 2024-01-17 RX ORDER — OXYTOCIN/0.9 % SODIUM CHLORIDE 30/500 ML
250 PLASTIC BAG, INJECTION (ML) INTRAVENOUS CONTINUOUS
Status: ACTIVE | OUTPATIENT
Start: 2024-01-17 | End: 2024-01-17

## 2024-01-17 RX ORDER — DIPHENHYDRAMINE HCL 25 MG
25 CAPSULE ORAL EVERY 4 HOURS PRN
Status: DISCONTINUED | OUTPATIENT
Start: 2024-01-17 | End: 2024-01-18

## 2024-01-17 RX ORDER — ONDANSETRON 2 MG/ML
4 INJECTION INTRAMUSCULAR; INTRAVENOUS ONCE AS NEEDED
Status: COMPLETED | OUTPATIENT
Start: 2024-01-17 | End: 2024-01-17

## 2024-01-17 RX ORDER — DROPERIDOL 2.5 MG/ML
0.62 INJECTION, SOLUTION INTRAMUSCULAR; INTRAVENOUS
Status: DISCONTINUED | OUTPATIENT
Start: 2024-01-17 | End: 2024-01-18

## 2024-01-17 RX ORDER — CARBOPROST TROMETHAMINE 250 UG/ML
INJECTION, SOLUTION INTRAMUSCULAR
Status: ACTIVE
Start: 2024-01-17 | End: 2024-01-18

## 2024-01-17 RX ORDER — MAGNESIUM SULFATE HEPTAHYDRATE 40 MG/ML
2 INJECTION, SOLUTION INTRAVENOUS CONTINUOUS
Status: DISCONTINUED | OUTPATIENT
Start: 2024-01-17 | End: 2024-01-17

## 2024-01-17 RX ORDER — ACETAMINOPHEN 500 MG
1000 TABLET ORAL ONCE
Status: COMPLETED | OUTPATIENT
Start: 2024-01-17 | End: 2024-01-17

## 2024-01-17 RX ORDER — CEFAZOLIN SODIUM 2 G/100ML
2000 INJECTION, SOLUTION INTRAVENOUS ONCE
Status: COMPLETED | OUTPATIENT
Start: 2024-01-17 | End: 2024-01-17

## 2024-01-17 RX ORDER — BUPIVACAINE HYDROCHLORIDE 7.5 MG/ML
INJECTION, SOLUTION EPIDURAL; RETROBULBAR
Status: COMPLETED | OUTPATIENT
Start: 2024-01-17 | End: 2024-01-17

## 2024-01-17 RX ORDER — ONDANSETRON 2 MG/ML
4 INJECTION INTRAMUSCULAR; INTRAVENOUS ONCE AS NEEDED
Status: DISCONTINUED | OUTPATIENT
Start: 2024-01-17 | End: 2024-01-18

## 2024-01-17 RX ORDER — FAMOTIDINE 10 MG/ML
20 INJECTION, SOLUTION INTRAVENOUS ONCE AS NEEDED
Status: COMPLETED | OUTPATIENT
Start: 2024-01-17 | End: 2024-01-17

## 2024-01-17 RX ORDER — CARBOPROST TROMETHAMINE 250 UG/ML
250 INJECTION, SOLUTION INTRAMUSCULAR ONCE AS NEEDED
Status: DISCONTINUED | OUTPATIENT
Start: 2024-01-17 | End: 2024-01-18

## 2024-01-17 RX ORDER — ACETAMINOPHEN 500 MG
1000 TABLET ORAL EVERY 6 HOURS
Status: COMPLETED | OUTPATIENT
Start: 2024-01-17 | End: 2024-01-18

## 2024-01-17 RX ORDER — MORPHINE SULFATE 4 MG/ML
INJECTION, SOLUTION INTRAMUSCULAR; INTRAVENOUS
Status: DISCONTINUED | OUTPATIENT
Start: 2024-01-17 | End: 2024-01-17

## 2024-01-17 RX ORDER — DIPHENHYDRAMINE HYDROCHLORIDE 50 MG/ML
25 INJECTION INTRAMUSCULAR; INTRAVENOUS ONCE
Status: COMPLETED | OUTPATIENT
Start: 2024-01-17 | End: 2024-01-17

## 2024-01-17 RX ORDER — FENTANYL CITRATE 50 UG/ML
INJECTION, SOLUTION INTRAMUSCULAR; INTRAVENOUS
Status: COMPLETED | OUTPATIENT
Start: 2024-01-17 | End: 2024-01-17

## 2024-01-17 RX ORDER — SODIUM CHLORIDE, SODIUM LACTATE, POTASSIUM CHLORIDE, CALCIUM CHLORIDE 600; 310; 30; 20 MG/100ML; MG/100ML; MG/100ML; MG/100ML
75 INJECTION, SOLUTION INTRAVENOUS CONTINUOUS
Status: DISCONTINUED | OUTPATIENT
Start: 2024-01-17 | End: 2024-01-18

## 2024-01-17 RX ORDER — FAMOTIDINE 10 MG/ML
20 INJECTION, SOLUTION INTRAVENOUS ONCE AS NEEDED
Status: DISCONTINUED | OUTPATIENT
Start: 2024-01-17 | End: 2024-01-18

## 2024-01-17 RX ADMIN — LABETALOL HYDROCHLORIDE 80 MG: 5 INJECTION, SOLUTION INTRAVENOUS at 05:24

## 2024-01-17 RX ADMIN — Medication 50 MCG: at 18:38

## 2024-01-17 RX ADMIN — Medication 10 ML: at 05:11

## 2024-01-17 RX ADMIN — Medication 10 ML: at 06:02

## 2024-01-17 RX ADMIN — CLONIDINE HYDROCHLORIDE 15 ML: 0.1 INJECTION, SOLUTION EPIDURAL at 19:31

## 2024-01-17 RX ADMIN — Medication 200 MCG: at 19:09

## 2024-01-17 RX ADMIN — Medication 100 MCG: at 19:06

## 2024-01-17 RX ADMIN — FAMOTIDINE 20 MG: 10 INJECTION INTRAVENOUS at 18:04

## 2024-01-17 RX ADMIN — Medication 75 ML/HR: at 20:37

## 2024-01-17 RX ADMIN — Medication 100 MCG: at 19:17

## 2024-01-17 RX ADMIN — Medication 50 MCG: at 18:43

## 2024-01-17 RX ADMIN — DIPHENHYDRAMINE HYDROCHLORIDE 25 MG: 50 INJECTION, SOLUTION INTRAMUSCULAR; INTRAVENOUS at 20:23

## 2024-01-17 RX ADMIN — MAGNESIUM SULFATE HEPTAHYDRATE 6 G: 40 INJECTION, SOLUTION INTRAVENOUS at 06:30

## 2024-01-17 RX ADMIN — SODIUM CITRATE AND CITRIC ACID MONOHYDRATE 30 ML: 334; 500 SOLUTION ORAL at 18:02

## 2024-01-17 RX ADMIN — HUMAN RHO(D) IMMUNE GLOBULIN 1500 UNITS: 1500 SOLUTION INTRAMUSCULAR; INTRAVENOUS at 23:50

## 2024-01-17 RX ADMIN — Medication 50 MCG: at 18:35

## 2024-01-17 RX ADMIN — Medication 100 MCG: at 19:03

## 2024-01-17 RX ADMIN — HYDRALAZINE HYDROCHLORIDE 5 MG: 20 INJECTION INTRAMUSCULAR; INTRAVENOUS at 06:00

## 2024-01-17 RX ADMIN — Medication 100 MCG: at 19:14

## 2024-01-17 RX ADMIN — HYDRALAZINE HYDROCHLORIDE 10 MG: 20 INJECTION INTRAMUSCULAR; INTRAVENOUS at 06:31

## 2024-01-17 RX ADMIN — DIPHENHYDRAMINE HYDROCHLORIDE 25 MG: 50 INJECTION, SOLUTION INTRAMUSCULAR; INTRAVENOUS at 21:50

## 2024-01-17 RX ADMIN — Medication 100 MCG: at 19:10

## 2024-01-17 RX ADMIN — FENTANYL CITRATE 15 MCG: 50 INJECTION, SOLUTION INTRAMUSCULAR; INTRAVENOUS at 18:25

## 2024-01-17 RX ADMIN — Medication 999 ML/HR: at 18:55

## 2024-01-17 RX ADMIN — Medication 100 MCG: at 19:27

## 2024-01-17 RX ADMIN — MAGNESIUM SULFATE HEPTAHYDRATE 2 G/HR: 40 INJECTION, SOLUTION INTRAVENOUS at 13:50

## 2024-01-17 RX ADMIN — Medication 50 MCG: at 18:49

## 2024-01-17 RX ADMIN — Medication 50 MCG: at 18:41

## 2024-01-17 RX ADMIN — ONDANSETRON 4 MG: 2 INJECTION INTRAMUSCULAR; INTRAVENOUS at 14:28

## 2024-01-17 RX ADMIN — Medication 100 MCG: at 18:51

## 2024-01-17 RX ADMIN — Medication 100 MCG: at 19:21

## 2024-01-17 RX ADMIN — Medication 10 ML: at 23:53

## 2024-01-17 RX ADMIN — Medication 50 MCG: at 18:46

## 2024-01-17 RX ADMIN — Medication 10 ML: at 05:25

## 2024-01-17 RX ADMIN — ACETAMINOPHEN 1000 MG: 500 TABLET ORAL at 23:26

## 2024-01-17 RX ADMIN — Medication 100 MCG: at 19:16

## 2024-01-17 RX ADMIN — SODIUM CHLORIDE, POTASSIUM CHLORIDE, SODIUM LACTATE AND CALCIUM CHLORIDE 75 ML/HR: 600; 310; 30; 20 INJECTION, SOLUTION INTRAVENOUS at 06:00

## 2024-01-17 RX ADMIN — ONDANSETRON 4 MG: 2 INJECTION INTRAMUSCULAR; INTRAVENOUS at 20:16

## 2024-01-17 RX ADMIN — BUPIVACAINE HYDROCHLORIDE 1.6 ML: 7.5 INJECTION, SOLUTION EPIDURAL; RETROBULBAR at 18:25

## 2024-01-17 RX ADMIN — LABETALOL HYDROCHLORIDE 40 MG: 5 INJECTION, SOLUTION INTRAVENOUS at 05:11

## 2024-01-17 RX ADMIN — MORPHINE SULFATE 150 MCG: 1 INJECTION, SOLUTION EPIDURAL; INTRATHECAL; INTRAVENOUS at 18:25

## 2024-01-17 RX ADMIN — Medication 100 MCG: at 19:00

## 2024-01-17 RX ADMIN — MAGNESIUM SULFATE HEPTAHYDRATE 2 G/HR: 40 INJECTION, SOLUTION INTRAVENOUS at 07:00

## 2024-01-17 RX ADMIN — CEFAZOLIN SODIUM 2000 MG: 2 INJECTION, SOLUTION INTRAVENOUS at 18:04

## 2024-01-17 RX ADMIN — MONTELUKAST SODIUM 10 MG: 10 TABLET, FILM COATED ORAL at 22:28

## 2024-01-17 RX ADMIN — LABETALOL HYDROCHLORIDE 20 MG: 5 INJECTION, SOLUTION INTRAVENOUS at 04:57

## 2024-01-17 RX ADMIN — ACETAMINOPHEN 1000 MG: 500 TABLET ORAL at 18:03

## 2024-01-17 RX ADMIN — Medication 50 MCG: at 18:48

## 2024-01-17 RX ADMIN — Medication 50 MCG: at 18:57

## 2024-01-17 NOTE — PAYOR COMM NOTE
"Dhiraj Perez (28 y.o. Female)       Date of Birth   1995    Social Security Number       Address   [address] [place] [address]    Home Phone   [phone]    MRN   [id number]       Roman Catholic   Patient Refused    Marital Status                               Admission Date   1/11/24    Admission Type   Emergency    Admitting Provider   Paloma Rodriguez MD    Attending Provider   Paloma Rodriguez MD    Department, Room/Bed   [facility] LABOR DELIVERY, L14/1       Discharge Date       Discharge Disposition       Discharge Destination                                 Attending Provider: Paloma Rodriguez MD    Allergies: No Known Allergies    Isolation: None   Infection: None   Code Status: CPR    Ht: 162.6 cm (64\")   Wt: 118 kg (260 lb 9.6 oz)    Admission Cmt: None   Principal Problem: Pregnancy [Z34.90]                   Active Insurance as of 1/11/2024       Primary Coverage       Payor Plan Insurance Group Employer/Plan Group    [facility] [employer] EMPL[employer]EE [id number]       Payor Plan Address Payor Plan Phone Number Payor Plan Fax Number Effective Dates    [address] [phone]  1/1/2022 - None Entered    [facility] [id number]         Subscriber Name Subscriber Birth Date Member ID       PEREZDHIRAJ HAMPTON АЛЕКСАНДР 1995 [id number]                     Emergency Contacts        (Rel.) Home Phone Work Phone Mobile Phone    PerezDong (Spouse) -- -- [phone]    GalenCriss fuentes (Mother) -- -- [phone]              Insurance Information                  [facility] [employer] [employer] Phone: [phone]    Subscriber: Dhiraj Perez Subscriber#: [id number]    Group#: [id number] Precert#: --             History & Physical        Paloma Rodriguez MD at 01/11/24 [address] [place] [address]  Obstetric History and Physical    Chief Complaint   Patient presents with    Elevated Blood Pressure     Pt sent from office " for bp check and labs       Subjective    Patient is a 28 y.o. female  currently at 32w5d, who presents from the office due to elevated BP.  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  Her blood pressure was in the 170s systolic.  She denies any headache, visual changes.  No cramping, bleeding.  She notes normal fetal movement    Her prenatal care is complicated by gestational diabetes only moderately well-controlled with insulin, have been adjusting her regimen frequently due to episodes of hypoglycemia on weight-based dosing.  She also has group B strep bacteriuria.  She is Rh- and has received RhoGAM.  Baby has been breech on most recent ultrasound. Nst only at last office visit.    The following portions of the patients history were reviewed and updated as appropriate: current medications, allergies, past medical history, past surgical history, problem list, and ob hx  .       Prenatal Information:       Prenatal Labs for [facility] Patients  Lab Results   Component Value Date    HGB 12.4 2024    HEPBSAG Negative 2023    ABSCRN Negative 2023    [id number] Non Reactive 2023    HEPCVIRUSABY Non Reactive 2023       Prenatal Labs -- transcribed from paper records by Nurse  Lab Results   Component Value Date    ABO B 2023    RH Negative 2023    HEPBSAG Negative 2023    RPR Non Reactive 2023    [id number] Non Reactive 2023           Past OB History:       OB History    Para Term  AB Living   1 0 0 0 0 0   SAB IAB Ectopic Molar Multiple Live Births   0 0 0 0 0 0      # Outcome Date GA Lbr Ochoa/2nd Weight Sex Delivery Anes PTL Lv   1 Current                Past Medical History: Past Medical History:   Diagnosis Date    Gestational diabetes     Gestational hypertension     Preeclampsia 2024    Pregnancy 2024    Seasonal allergies       Past  Surgical History Past Surgical History:   Procedure Laterality Date    NO PAST SURGERIES        Family History: Family History   Problem Relation Age of Onset    Diabetes Father     Diabetes Mother     Breast cancer Mother     Polycystic ovary syndrome Sister     Congenital heart disease Neg Hx       Social History:  reports that she has never smoked. She has never used smokeless tobacco.   reports that she does not currently use alcohol.   reports no history of drug use.           Objective      Vital Signs Range for the last 24 hours  Temperature: Temp:  [97.5 °F (36.4 °C)] 97.5 °F (36.4 °C)   Temp Source: Temp src: Oral   BP: BP: (120-149)/(80-92) 148/87   Pulse: Heart Rate:  [65-83] 68   Respirations: Resp:  [18] 18   SPO2: SpO2:  [100 %] 100 %   O2 Amount (l/min):     O2 Devices     Weight: Weight:  [119 kg (263 lb)] 119 kg (263 lb)     Physical Examination: General appearance - alert, well appearing, and in no distress  Chest - no tachypnea, retractions or cyanosis  Abdomen -fundus soft, nontender.  No right upper quadrant abdominal tenderness  Neurological - alert, oriented, normal speech, no focal findings or movement disorder noted, normal patellar DTR  Extremities -trace pedal edema bilateral shins  Skin - normal coloration and turgor, no rashes, no suspicious skin lesions noted    Presentation: Not yet evaluated   Cervix: Exam by:     Dilation:     Effacement:     Station:         Fetal Heart Rate Assessment   Method: Fetal HR Assessment Method: external   Beats/min: Fetal HR (beats/min): 140   Baseline: Fetal HR Baseline: normal range   Varibility: Fetal HR Variability: moderate (amplitude range 6 to 25 bpm)   Accels: Fetal HR Accelerations: greater than/equal to 15 bpm, lasting at least 15 seconds   Decels: Fetal HR Decelerations: absent   Tracing Category:       Uterine Assessment   Method: Method: palpation, external tocotransducer   Frequency (min):     Ctx Count in 10 min:     Duration:      Intensity: Contraction Intensity: no contractions   Intensity by IUPC:     Resting Tone: Uterine Resting Tone: soft by palpation   Resting Tone by IUPC:     [place] Units:       Results from last 7 days   Lab Units 01/11/24  1653   WBC 10*3/mm3 12.84*   HEMOGLOBIN g/dL 12.4   HEMATOCRIT % 38.1   PLATELETS 10*3/mm3 231       Assessment & Plan      Pregnancy    Rh negative status during pregnancy in first trimester    Group B streptococcal bacteriuria    Preeclampsia        Assessment:  1.  Intrauterine pregnancy at 32w5d weeks gestation with reactive fetal status.    2.  Preeclampsia.  Insulin controlled gestational diabetes  3.  Obstetrical history benign  4.  GBS status: Positive bacteriuria    Plan:  1.  She has mildly elevated blood pressures and significant protein creatinine ratio 800 mg.  She is asymptomatic and otherwise her labs are within normal limits.  Given this quick onset of hypertension and proteinuria, recommended observing as she may progress to severe preeclampsia.  Plan for 24-hour urine, we will consult MFM tomorrow as she has gestational diabetes only moderately well-controlled with her current insulin regimen and may need steroids for fetal lung maturity.  2.  Insulin controlled gestational diabetes-she had episodes of hypoglycemia on a weight-based regimen and her most recent regimen has been 8 units of NPH in the morning and 20 units NPH at night.  24 units of short acting with breakfast and 8 with dinner.  3.  Rh negative- she has received RhoGAM  For DVT prophylaxis-SCDs ordered        Paloma Rodriguez MD  01/11/2024  19:55 EST        Electronically signed by Paloma Rodriguez MD at 01/11/24 1957       Facility-Administered Medications as of 1/17/2024   Medication Dose Route Frequency Provider Last Rate Last Admin    acetaminophen (TYLENOL) tablet 1,000 mg  1,000 mg Oral Once Tonya Valencia MD        [COMPLETED] betamethasone acetate-betamethasone sodium phosphate (CELESTONE  SOLUSPAN) injection 12 mg  12 mg Intramuscular Q24H María Mcnair MD   12 mg at 01/13/24 1051    ceFAZolin in dextrose (ANCEF) IVPB solution 2,000 mg  2,000 mg Intravenous Once Tonya Valencia MD        dextrose (D50W) (25 g/50 mL) IV injection 25 g  25 g Intravenous Q15 Min PRN Paloma Rodriguez MD        dextrose (GLUTOSE) oral gel 15 g  15 g Oral Q15 Min PRN Paloma Rodriguez MD        famotidine (PEPCID) injection 20 mg  20 mg Intravenous Once PRN Tonya Valencia MD        glucagon HCl (Diagnostic) injection 1 mg  1 mg Intramuscular Q15 Min PRN Paloma Rodriguez MD        hydrALAZINE (APRESOLINE) injection 5-10 mg  5-10 mg Intravenous Q20 Min PRN Mony Stock MD   10 mg at 01/17/24 0631    Or    labetalol (NORMODYNE,TRANDATE) injection 20-80 mg  20-80 mg Intravenous Q10 Min PRN Mony Stock MD   80 mg at 01/17/24 0524    Or    NIFEdipine (PROCARDIA) capsule 10-20 mg  10-20 mg Oral Q20 Min PRN Mony Stock MD        ketorolac (TORADOL) injection 30 mg  30 mg Intravenous Once Tonya Valencia MD        lactated ringers infusion  75 mL/hr Intravenous Continuous Mony Stock MD 75 mL/hr at 01/17/24 0600 75 mL/hr at 01/17/24 0600    loratadine (CLARITIN) tablet 10 mg  10 mg Oral Daily Teena Pa MD   10 mg at 01/16/24 0734    magnesium sulfate 20 GM/500ML infusion  2 g/hr Intravenous Continuous Mony Stock MD 50 mL/hr at 01/17/24 0700 2 g/hr at 01/17/24 0700    [COMPLETED] magnesium sulfate bolus from bag 0.04 g/mL solution 6 g  6 g Intravenous Once Mony Stock  mL/hr at 01/17/24 0630 6 g at 01/17/24 0630    montelukast (SINGULAIR) tablet 10 mg  10 mg Oral Nightly Teena Pa, MD   10 mg at 01/16/24 2105    ondansetron (ZOFRAN) injection 4 mg  4 mg Intravenous Once PRN Tonya Valencia MD        ondansetron (ZOFRAN) tablet 4 mg  4 mg Oral Q8H PRN Haroon Barroso MD        oxytocin (PITOCIN) 30  units in 0.9% sodium chloride 500 mL (premix)  999 mL/hr Intravenous Once Tonya Valencia MD        Followed by    oxytocin (PITOCIN) 30 units in 0.9% sodium chloride 500 mL (premix)  250 mL/hr Intravenous Continuous Tonya Valencia MD        prenatal vitamin tablet 1 tablet  1 tablet Oral Daily Teena Pa MD   1 tablet at 01/16/24 0734    sodium chloride 0.9 % flush 10 mL  10 mL Intravenous Q12H Mony Stock MD   10 mL at 01/16/24 2106    sodium chloride 0.9 % flush 10 mL  10 mL Intravenous PRN Mony Stock MD   10 mL at 01/17/24 0602     Lab Results (last 72 hours)       Procedure Component Value Units Date/Time    CBC & Differential [[id number]]  (Abnormal) Collected: 01/17/24 1038    Specimen: Blood Updated: 01/17/24 1055    Narrative:      The following orders were created for panel order CBC & Differential.  Procedure                               Abnormality         Status                     ---------                               -----------         ------                     CBC Auto Differential[[id number]]        Abnormal            Final result                 Please view results for these tests on the individual orders.    CBC Auto Differential [[id number]]  (Abnormal) Collected: 01/17/24 1038    Specimen: Blood Updated: 01/17/24 1055     WBC 14.38 10*3/mm3      RBC 4.44 10*6/mm3      Hemoglobin 13.3 g/dL      Hematocrit 39.1 %      MCV 88.1 fL      MCH 30.0 pg      MCHC 34.0 g/dL      RDW 12.7 %      RDW-SD 41.3 fl      MPV 11.6 fL      Platelets 232 10*3/mm3      Neutrophil % 77.2 %      Lymphocyte % 16.4 %      Monocyte % 5.6 %      Eosinophil % 0.1 %      Basophil % 0.1 %      Immature Grans % 0.6 %      Neutrophils, Absolute 11.10 10*3/mm3      Lymphocytes, Absolute 2.36 10*3/mm3      Monocytes, Absolute 0.81 10*3/mm3      Eosinophils, Absolute 0.01 10*3/mm3      Basophils, Absolute 0.02 10*3/mm3      Immature Grans, Absolute 0.08 10*3/mm3      nRBC 0.0  /100 WBC     Comprehensive Metabolic Panel [[id number]] Collected: 01/17/24 1038    Specimen: Blood Updated: 01/17/24 1044    Uric Acid [[id number]] Collected: 01/17/24 1038    Specimen: Blood Updated: 01/17/24 1044    Lactate Dehydrogenase [[id number]] Collected: 01/17/24 1038    Specimen: Blood Updated: 01/17/24 1044    T Pallidum Antibody w/ reflex RPR [[id number]] Collected: 01/17/24 1038    Specimen: Blood Updated: 01/17/24 1044    POC Glucose Once [[id number]]  (Normal) Collected: 01/17/24 0640    Specimen: Blood Updated: 01/17/24 0641     Glucose 81 mg/dL     POC Glucose Once [[id number]]  (Normal) Collected: 01/16/24 2209    Specimen: Blood Updated: 01/16/24 2210     Glucose 77 mg/dL     POC Glucose Once [[id number]]  (Normal) Collected: 01/16/24 1942    Specimen: Blood Updated: 01/16/24 1944     Glucose 110 mg/dL     Group B Streptococcus Culture - Swab, Vaginal/Rectum [[id number]] Resulted: 08/01/23    Specimen: Swab from Vaginal/Rectum Updated: 01/16/24 1921     External Strep Group B Ag Positive    POC Glucose Once [[id number]]  (Abnormal) Collected: 01/16/24 1804    Specimen: Blood Updated: 01/16/24 1805     Glucose 66 mg/dL     POC Glucose Once [[id number]]  (Abnormal) Collected: 01/16/24 1345    Specimen: Blood Updated: 01/16/24 1346     Glucose 69 mg/dL     POC Glucose Once [[id number]]  (Abnormal) Collected: 01/16/24 1211    Specimen: Blood Updated: 01/16/24 1214     Glucose 64 mg/dL     POC Glucose Once [[id number]]  (Abnormal) Collected: 01/16/24 1154    Specimen: Blood Updated: 01/16/24 1156     Glucose 54 mg/dL     Comprehensive Metabolic Panel [[id number]]  (Abnormal) Collected: 01/16/24 1020    Specimen: Blood Updated: 01/16/24 1100     Glucose 71 mg/dL      BUN 9 mg/dL      Creatinine 0.64 mg/dL      Sodium 136 mmol/L      Potassium 4.0 mmol/L      Chloride 105 mmol/L      CO2 21.0 mmol/L      Calcium 9.2 mg/dL      Total Protein 5.4 g/dL      Albumin 3.3 g/dL      ALT (SGPT) 21 U/L      AST (SGOT) 16 U/L       Alkaline Phosphatase 97 U/L      Total Bilirubin 0.2 mg/dL      Globulin 2.1 gm/dL      A/G Ratio 1.6 g/dL      BUN/Creatinine Ratio 14.1     Anion Gap 10.0 mmol/L      eGFR 122.1 mL/min/1.73     Narrative:      GFR Normal >60  Chronic Kidney Disease <60  Kidney Failure <15      Lactate Dehydrogenase [[id number]]  (Normal) Collected: 01/16/24 1020    Specimen: Blood Updated: 01/16/24 1100      U/L     Uric Acid [[id number]]  (Abnormal) Collected: 01/16/24 1020    Specimen: Blood Updated: 01/16/24 1100     Uric Acid 6.9 mg/dL     CBC & Differential [[id number]]  (Abnormal) Collected: 01/16/24 1020    Specimen: Blood Updated: 01/16/24 1041    Narrative:      The following orders were created for panel order CBC & Differential.  Procedure                               Abnormality         Status                     ---------                               -----------         ------                     CBC Auto Differential[[id number]]        Abnormal            Final result                 Please view results for these tests on the individual orders.    CBC Auto Differential [[id number]]  (Abnormal) Collected: 01/16/24 1020    Specimen: Blood Updated: 01/16/24 1041     WBC 12.62 10*3/mm3      RBC 4.53 10*6/mm3      Hemoglobin 12.8 g/dL      Hematocrit 38.9 %      MCV 85.9 fL      MCH 28.3 pg      MCHC 32.9 g/dL      RDW 12.0 %      RDW-SD 37.6 fl      MPV 11.3 fL      Platelets 238 10*3/mm3      Neutrophil % 63.8 %      Lymphocyte % 26.5 %      Monocyte % 8.7 %      Eosinophil % 0.2 %      Basophil % 0.2 %      Immature Grans % 0.6 %      Neutrophils, Absolute 8.05 10*3/mm3      Lymphocytes, Absolute 3.34 10*3/mm3      Monocytes, Absolute 1.10 10*3/mm3      Eosinophils, Absolute 0.03 10*3/mm3      Basophils, Absolute 0.03 10*3/mm3      Immature Grans, Absolute 0.07 10*3/mm3      nRBC 0.0 /100 WBC     POC Glucose Once [[id number]]  (Normal) Collected: 01/16/24 0939    Specimen: Blood Updated: 01/16/24 0940      Glucose 72 mg/dL     POC Glucose Once [[id number]]  (Abnormal) Collected: 01/16/24 0651    Specimen: Blood Updated: 01/16/24 0652     Glucose 60 mg/dL     POC Glucose Once [[id number]]  (Normal) Collected: 01/15/24 2133    Specimen: Blood Updated: 01/15/24 2134     Glucose 80 mg/dL     POC Glucose Once [[id number]]  (Abnormal) Collected: 01/15/24 1933    Specimen: Blood Updated: 01/15/24 1934     Glucose 143 mg/dL     POC Glucose Once [[id number]]  (Abnormal) Collected: 01/15/24 1805    Specimen: Blood Updated: 01/15/24 1806     Glucose 65 mg/dL     POC Glucose Once [[id number]]  (Normal) Collected: 01/15/24 1331    Specimen: Blood Updated: 01/15/24 1332     Glucose 85 mg/dL     POC Glucose Once [[id number]]  (Abnormal) Collected: 01/15/24 1203    Specimen: Blood Updated: 01/15/24 1204     Glucose 67 mg/dL     POC Glucose Once [[id number]]  (Normal) Collected: 01/15/24 0926    Specimen: Blood Updated: 01/15/24 0927     Glucose 94 mg/dL     POC Glucose Once [[id number]]  (Normal) Collected: 01/15/24 0708    Specimen: Blood Updated: 01/15/24 0712     Glucose 75 mg/dL     POC Glucose Once [[id number]]  (Abnormal) Collected: 01/15/24 0651    Specimen: Blood Updated: 01/15/24 0652     Glucose 59 mg/dL     Comprehensive Metabolic Panel [[id number]]  (Abnormal) Collected: 01/15/24 0529    Specimen: Blood Updated: 01/15/24 0641     Glucose 61 mg/dL      BUN 10 mg/dL      Creatinine 0.55 mg/dL      Sodium 138 mmol/L      Potassium 4.1 mmol/L      Chloride 106 mmol/L      CO2 21.4 mmol/L      Calcium 8.2 mg/dL      Total Protein 4.9 g/dL      Albumin 2.8 g/dL      ALT (SGPT) 17 U/L      AST (SGOT) 18 U/L      Alkaline Phosphatase 83 U/L      Total Bilirubin <0.2 mg/dL      Globulin 2.1 gm/dL      A/G Ratio 1.3 g/dL      BUN/Creatinine Ratio 18.2     Anion Gap 10.6 mmol/L      eGFR 126.6 mL/min/1.73     Narrative:      GFR Normal >60  Chronic Kidney Disease <60  Kidney Failure <15      Uric Acid [[id number]]  (Abnormal) Collected:  01/15/24 0529    Specimen: Blood Updated: 01/15/24 0641     Uric Acid 7.3 mg/dL     Lactate Dehydrogenase [[id number]]  (Normal) Collected: 01/15/24 0529    Specimen: Blood Updated: 01/15/24 0641      U/L     CBC & Differential [[id number]]  (Abnormal) Collected: 01/15/24 0529    Specimen: Blood Updated: 01/15/24 0558    Narrative:      The following orders were created for panel order CBC & Differential.  Procedure                               Abnormality         Status                     ---------                               -----------         ------                     CBC Auto Differential[[id number]]        Abnormal            Final result                 Please view results for these tests on the individual orders.    CBC Auto Differential [[id number]]  (Abnormal) Collected: 01/15/24 0529    Specimen: Blood Updated: 01/15/24 0558     WBC 12.25 10*3/mm3      RBC 3.94 10*6/mm3      Hemoglobin 11.9 g/dL      Hematocrit 35.0 %      MCV 88.8 fL      MCH 30.2 pg      MCHC 34.0 g/dL      RDW 12.3 %      RDW-SD 40.2 fl      MPV 11.4 fL      Platelets 215 10*3/mm3      Neutrophil % 58.5 %      Lymphocyte % 32.1 %      Monocyte % 8.4 %      Eosinophil % 0.3 %      Basophil % 0.2 %      Immature Grans % 0.5 %      Neutrophils, Absolute 7.16 10*3/mm3      Lymphocytes, Absolute 3.93 10*3/mm3      Monocytes, Absolute 1.03 10*3/mm3      Eosinophils, Absolute 0.04 10*3/mm3      Basophils, Absolute 0.03 10*3/mm3      Immature Grans, Absolute 0.06 10*3/mm3      nRBC 0.0 /100 WBC     POC Glucose Once [[id number]]  (Normal) Collected: 01/14/24 2254    Specimen: Blood Updated: 01/14/24 2255     Glucose 109 mg/dL     POC Glucose Once [[id number]]  (Abnormal) Collected: 01/14/24 2101    Specimen: Blood Updated: 01/14/24 2102     Glucose 177 mg/dL     POC Glucose Once [[id number]]  (Normal) Collected: 01/14/24 1935    Specimen: Blood Updated: 01/14/24 1937     Glucose 77 mg/dL     POC Glucose Once [[id number]]  (Abnormal)  Collected: 01/14/24 1703    Specimen: Blood Updated: 01/14/24 1704     Glucose 135 mg/dL     POC Glucose Once [[id number]]  (Normal) Collected: 01/14/24 1445    Specimen: Blood Updated: 01/14/24 1446     Glucose 73 mg/dL     POC Glucose Once [[id number]]  (Abnormal) Collected: 01/14/24 1422    Specimen: Blood Updated: 01/14/24 1423     Glucose 58 mg/dL     POC Glucose Once [[id number]]  (Normal) Collected: 01/14/24 1158    Specimen: Blood Updated: 01/14/24 1159     Glucose 103 mg/dL           Imaging Results (Last 72 Hours)       Procedure Component Value Units Date/Time    [facility] [[id number]] Collected: 01/16/24 0847     Updated: 01/16/24 1025    Narrative:      PAT NAME: DHIRAJ PEREZ  MED REC#: [id number]  BIRTH [birth year]: [id number]  PAT GEND: F  ACCOUNT#: [id number]  PAT TYPE: I  EXAM UYEN: [id number]  REF PHYS TOM AGUILAR  ACCESSION [id number]      Comparison Studies  =================    The findings of this study are compared to the prior ultrasound study dated 1/12/24      Patient Status  ============    Inpatient      Indication  ========    Hypertension/ Preeclampsia      Maternal Assessment  ==================    Height 163 cm  Height (ft) 5 ft  Height (in) 4 in  Weight 118 kg  Weight (lb) 260 lb  BMI 44.63 kg/m²      Method  =======    Transabdominal ultrasound examination. View: Suboptimal view: limited by maternal body habitusSuboptimal view: limited by fetal position      Pregnancy  =========    Morfin pregnancy. Number of fetuses: 1      Dating  ======    Method of dating: based on stated ARACELY  Prior assessment by: dates from [facility]  [place] by prior assessment 33 w + 2 d  ARACELY by prior assessment: 3/3/2024  Previous dating: based on stated ARACELY, selected on 01/12/2024  Agreed ARACELY of previous dating: 3/3/2024  Assigned: based on stated ARACELY (dates from [facility]), selected on 01/16/2024  Assigned GA 33 w + 2 d  Assigned ARACELY: 3/3/2024  Pregnancy length 280 d      General  Evaluation  ================    Cardiac activity present.  bpm.  Fetal movements present.  Presentation cephalic.  Placenta posterior.  Umbilical cord 3 vessel cord; normal placental insertion.  Amniotic fluid Amount of AF: normal. MVP 6.2 cm. TONA 21.9 cm. Q1 6.2 cm, Q2 4.0 cm, Q3 5.7 cm, Q4 6.1 cm.      Fetal Anatomy  ============    Cranium: suboptimal  4-chamber view: suboptimal  Heart / Thorax  4-chamber view: patent foramen ovale  Cord insertion: Appears normal  Stomach: Appears normal  Kidneys: Appears normal  Bladder: Appears normal  Wants to know gender: yes      Biophysical Profile  ===============    2: Fetal breathing movements  2: Gross body movements  2: Fetal tone  2: Amniotic fluid volume  8/8 Biophysical profile score      Impression  =========    Cephalic presentation.  Posterior placenta.  TONA 21 cm, which is normal.  BPP 8/8      Recommendation  ===============    See epic consult note.      Coding  ======    Description: [id number] BPP without NST        Sonographer: Karen Bond RDMS  Physician: María Mcnair MD    Electronically signed by: María Mcnair MD at: 2024/01/16 10:24          ECG/EMG Results (last 72 hours)       ** No results found for the last 72 hours. **          Orders (last 72 hrs)        Start     Ordered    01/19/24 0000  [facility]  1 Time Imaging         01/14/24 1150    01/18/24 0600  CBC & Differential  Every Mon, Thu       01/15/24 1219    01/18/24 0600  Comprehensive Metabolic Panel  Every Mon, Thu       01/15/24 1219    01/18/24 0600  Lactate Dehydrogenase  Every Mon, Thu       01/15/24 1219    01/18/24 0600  Uric Acid  Every Mon, Thu       01/15/24 1912    01/17/24 1115  oxytocin (PITOCIN) 30 units in 0.9% sodium chloride 500 mL (premix)  Continuous        See Hyperspace for full Linked Orders Report.    01/17/24 1012    01/17/24 1100  acetaminophen (TYLENOL) tablet 1,000 mg  Once         01/17/24 1012    01/17/24 1100  ketorolac  (TORADOL) injection 30 mg  Once         01/17/24 1012    01/17/24 1100  oxytocin (PITOCIN) 30 units in 0.9% sodium chloride 500 mL (premix)  Once        See Hyperspace for full Linked Orders Report.    01/17/24 1012    01/17/24 1100  ceFAZolin in dextrose (ANCEF) IVPB solution 2,000 mg  Once         01/17/24 1012    01/17/24 1016  T Pallidum Antibody w/ reflex RPR  Once         01/17/24 1015    01/17/24 1011  Lactate Dehydrogenase  Once         01/17/24 1012    01/17/24 1011  Type & Screen  STAT         01/17/24 1012    01/17/24 1010  ondansetron (ZOFRAN) injection 4 mg  Once As Needed         01/17/24 1012    01/17/24 1010  CBC & Differential  STAT         01/17/24 1012    01/17/24 1010  Comprehensive Metabolic Panel  STAT         01/17/24 1012    01/17/24 1010  Uric Acid  Once         01/17/24 1012    01/17/24 1010  CBC Auto Differential  PROCEDURE ONCE         01/17/24 1012    01/17/24 1007  Case Request  Once         01/17/24 1012    01/17/24 1006  Abdominal Prep With Clippers  Once         01/17/24 1012    01/17/24 1006  Chlorhexadine Skin Prep Unless Otherwise Indicated  Once         01/17/24 1012    01/17/24 1006  SCD (Sequential Compression Devices)  Once         01/17/24 1012    01/17/24 1006  Document Gatorade Consumption Prior to Admission (Yes or No)  Once         01/17/24 1012    01/17/24 1006  NPO Diet NPO Type: Strict NPO  Diet Effective Now         01/17/24 1012    01/17/24 1006  Inpatient Anesthesiology Consult  Once        Specialty:  Anesthesiology  Provider:  (Not yet assigned)    01/17/24 1012    01/17/24 1005  famotidine (PEPCID) injection 20 mg  Once As Needed         01/17/24 1012    01/17/24 0815  lactated ringers infusion  Continuous         01/17/24 0727    01/17/24 0730  magnesium sulfate bolus from bag 0.04 g/mL solution 6 g  Once         01/17/24 0634    01/17/24 0730  magnesium sulfate 20 GM/500ML infusion  Continuous         01/17/24 0634    01/17/24 0715  magnesium sulfate 20  GM/500ML infusion  Continuous,   Status:  Discontinued         01/17/24 0626    01/17/24 0715  magnesium sulfate 20 GM/500ML infusion  Continuous,   Status:  Discontinued         01/17/24 0626    01/17/24 0715  magnesium sulfate bolus from bag 0.04 g/mL solution 6 g  Once,   Status:  Discontinued         01/17/24 0626    01/17/24 0700  insulin NPH (humuLIN N,novoLIN N) injection 10 Units  Every Morning,   Status:  Discontinued         01/16/24 1026    01/17/24 0642  POC Glucose Once  PROCEDURE ONCE        Comments: Complete no more than 45 minutes prior to patient eating      01/17/24 0640    01/17/24 0634  Vital Signs  Per Order Details        Comments: Every 15 Minutes For 1 Hour, Then Every Hour.    01/17/24 0634    01/17/24 0634  Continuous Pulse Oximetry  Continuous         01/17/24 0634    01/17/24 0634  Check Clonus (DTRs) With Vitals  Per Hospital Policy         01/17/24 0634    01/17/24 0634  Assess LOC With Vitals  Per Hospital Policy         01/17/24 0634    01/17/24 0634  Strict Intake & Output  Every Shift       01/17/24 0634    01/17/24 0634  Total IV Fluids Should Equal 125 mL/hour  Continuous         01/17/24 0634    01/17/24 0556  Transfer to L&D now.  Physician Communication Order  Once        Comments: Transfer to L&D now.    01/17/24 0556    01/17/24 0514  sodium chloride 0.9 % flush 10 mL  As Needed         01/17/24 0515    01/16/24 2210  POC Glucose Once  PROCEDURE ONCE        Comments: Complete no more than 45 minutes prior to patient eating      01/16/24 2209    01/16/24 2100  insulin NPH (humuLIN N,novoLIN N) injection 22 Units  Nightly,   Status:  Discontinued         01/16/24 1026    01/16/24 1945  POC Glucose Once  PROCEDURE ONCE        Comments: Complete no more than 45 minutes prior to patient eating      01/16/24 1942    01/16/24 1806  POC Glucose Once  PROCEDURE ONCE        Comments: Complete no more than 45 minutes prior to patient eating      01/16/24 1804    01/16/24 1347  POC  Glucose Once  PROCEDURE ONCE        Comments: Complete no more than 45 minutes prior to patient eating      01/16/24 1345    01/16/24 1215  POC Glucose Once  PROCEDURE ONCE        Comments: Complete no more than 45 minutes prior to patient eating      01/16/24 1211    01/16/24 1210  Inpatient Consult to Neonatology  Once        Specialty:  Neonatology  Provider:  Venu Villarreal MD    01/16/24 1209    01/16/24 1157  POC Glucose Once  PROCEDURE ONCE        Comments: Complete no more than 45 minutes prior to patient eating      01/16/24 1154    01/16/24 1006  Uric Acid  Once         01/16/24 1006    01/16/24 1003  Lactate Dehydrogenase  Once         01/16/24 1002    01/16/24 1002  CBC & Differential  STAT         01/16/24 1002    01/16/24 1002  Comprehensive Metabolic Panel  Once         01/16/24 1002    01/16/24 1002  CBC Auto Differential  PROCEDURE ONCE         01/16/24 1002    01/16/24 0941  POC Glucose Once  PROCEDURE ONCE        Comments: Complete no more than 45 minutes prior to patient eating      01/16/24 0939    01/16/24 0920  hydrALAZINE (APRESOLINE) injection 5-10 mg  Every 20 Minutes PRN        See Hyperspace for full Linked Orders Report.    01/16/24 0921    01/16/24 0920  labetalol (NORMODYNE,TRANDATE) injection 20-80 mg  Every 10 Minutes PRN        See Hyperspace for full Linked Orders Report.    01/16/24 0921    01/16/24 0920  NIFEdipine (PROCARDIA) capsule 10-20 mg  Every 20 Minutes PRN        See Hyperspace for full Linked Orders Report.    01/16/24 0921    01/16/24 0653  POC Glucose Once  PROCEDURE ONCE        Comments: Complete no more than 45 minutes prior to patient eating      01/16/24 0651    01/16/24 0000  [facility]  1 Time Imaging         01/14/24 1145    01/16/24 0000  Group B Streptococcus Culture - Swab, Vaginal/Rectum        Comments: This is an external result entered through the Results Console.      01/16/24 1921    01/15/24 2135  POC Glucose Once  PROCEDURE  ONCE        Comments: Complete no more than 45 minutes prior to patient eating      01/15/24 2133    01/15/24 1935  POC Glucose Once  PROCEDURE ONCE        Comments: Complete no more than 45 minutes prior to patient eating      01/15/24 1933    01/15/24 1807  POC Glucose Once  PROCEDURE ONCE        Comments: Complete no more than 45 minutes prior to patient eating      01/15/24 1805    01/15/24 1333  POC Glucose Once  PROCEDURE ONCE        Comments: Complete no more than 45 minutes prior to patient eating      01/15/24 1331    01/15/24 1205  POC Glucose Once  PROCEDURE ONCE        Comments: Complete no more than 45 minutes prior to patient eating      01/15/24 1203    01/15/24 0928  POC Glucose Once  PROCEDURE ONCE        Comments: Complete no more than 45 minutes prior to patient eating      01/15/24 0926    01/15/24 0717  Admit To Obstetrics Inpatient  Once         01/15/24 0717    01/15/24 0713  POC Glucose Once  PROCEDURE ONCE        Comments: Complete no more than 45 minutes prior to patient eating      01/15/24 0708    01/15/24 0653  POC Glucose Once  PROCEDURE ONCE        Comments: Complete no more than 45 minutes prior to patient eating      01/15/24 0651    01/15/24 0600  CBC Auto Differential  PROCEDURE ONCE         01/14/24 2201    01/14/24 2256  POC Glucose Once  PROCEDURE ONCE        Comments: Complete no more than 45 minutes prior to patient eating      01/14/24 2254    01/14/24 2103  POC Glucose Once  PROCEDURE ONCE        Comments: Complete no more than 45 minutes prior to patient eating      01/14/24 2101    01/14/24 1938  POC Glucose Once  PROCEDURE ONCE        Comments: Complete no more than 45 minutes prior to patient eating      01/14/24 1935    01/14/24 1705  POC Glucose Once  PROCEDURE ONCE        Comments: Complete no more than 45 minutes prior to patient eating      01/14/24 1703    01/14/24 1507  ondansetron (ZOFRAN) tablet 4 mg  Every 8 Hours PRN         01/14/24 1508    01/14/24 1447  POC  Glucose Once  PROCEDURE ONCE        Comments: Complete no more than 45 minutes prior to patient eating      01/14/24 1445    01/14/24 1424  POC Glucose Once  PROCEDURE ONCE        Comments: Complete no more than 45 minutes prior to patient eating      01/14/24 1422    01/14/24 1230  heparin (porcine) 5000 UNIT/ML injection 10,000 Units  Every 12 Hours Scheduled,   Status:  Discontinued         01/14/24 1144    01/14/24 1200  POC Glucose Once  PROCEDURE ONCE        Comments: Complete no more than 45 minutes prior to patient eating      01/14/24 1158    01/14/24 0900  Enoxaparin Sodium (LOVENOX) syringe 40 mg  Daily,   Status:  Discontinued         01/13/24 1157    01/14/24 0900  loratadine (CLARITIN) tablet 10 mg  Daily         01/13/24 2242 01/14/24 0900  prenatal vitamin tablet 1 tablet  Daily         01/13/24 2242 01/13/24 2330  montelukast (SINGULAIR) tablet 10 mg  Nightly         01/13/24 2242 01/13/24 0700  insulin NPH (humuLIN N,novoLIN N) injection 12 Units  Every Morning,   Status:  Discontinued         01/12/24 1127    01/13/24 0600  CBC & Differential  Daily,   Status:  Canceled       01/12/24 1223    01/13/24 0600  Comprehensive Metabolic Panel  Daily,   Status:  Canceled       01/12/24 1223    01/13/24 0600  Uric Acid  Daily,   Status:  Canceled       01/12/24 1223    01/13/24 0600  Lactate Dehydrogenase  Daily,   Status:  Canceled       01/12/24 1233    01/12/24 2100  Fetal Nonstress Test  2 Times Daily      Comments: Patient presents with:  Elevated Blood Pressure: Pt sent from office for bp check and labs      01/12/24 1125    01/12/24 2100  insulin NPH (humuLIN N,novoLIN N) injection 25 Units  Nightly,   Status:  Discontinued         01/12/24 1127    01/12/24 2045  sodium chloride 0.9 % flush 10 mL  Every 12 Hours Scheduled         01/12/24 1952    01/12/24 1200  Vital Signs  Every 4 Hours      Comments: Every 4 hours, WHILE AWAKE    01/12/24 1107    01/12/24 0800  insulin regular (humuLIN  R,novoLIN R) injection 24 Units  Daily With Breakfast,   Status:  Discontinued         01/11/24 1916 01/12/24 0600  POC Glucose 4x Daily Fasting & PC  4 Times Daily Fasting & After Meals      Comments: Complete no more than 45 minutes prior to patient eatingAnd complete one hour after eating meal please      01/11/24 1950 01/11/24 2200  POC Glucose 4x Daily Before Meals & at Bedtime  4 Times Daily Before Meals & at Bedtime      Comments: Complete no more than 45 minutes prior to patient eating      01/11/24 1950 01/11/24 2015  insulin regular (humuLIN R,novoLIN R) injection 8 Units  Daily With Dinner,   Status:  Discontinued         01/11/24 1916 01/11/24 1950  dextrose (GLUTOSE) oral gel 15 g  Every 15 Minutes PRN         01/11/24 1950 01/11/24 1950  dextrose (D50W) (25 g/50 mL) IV injection 25 g  Every 15 Minutes PRN         01/11/24 1950 01/11/24 1950  glucagon HCl (Diagnostic) injection 1 mg  Every 15 Minutes PRN         01/11/24 1950 12/22/23 0000  NovoLIN N FlexPen 100 UNIT/ML injection         01/11/24 1644    12/06/23 0000  Blood Glucose Monitoring Suppl (Accu-Chek Guide Me) w/Device kit         01/11/24 1644    Unscheduled  Position Change - For Intra-Uterine Resusitation for Hypertonus, HyperStimulation or Non-Reassuring Fetal Status  As Needed       01/11/24 1802    Unscheduled  Position Change - For Intra-Uterine Resusitation for Hypertonus, HyperStimulation or Non-Reassuring Fetal Status  As Needed       01/11/24 1802    Unscheduled  Follow Hypoglycemia Standing Orders For Blood Glucose <70 & Notify Provider of Treatment  As Needed      Comments: Follow Hypoglycemia Orders As Outlined in Process Instructions (Open Order Report to View Full Instructions)  Notify Provider Any Time Hypoglycemia Treatment is Administered    01/11/24 1950                  Operative/Procedure Notes (last 72 hours)  Notes from 01/14/24 1105 through 01/17/24 1105   No notes of this type exist for this  "encounter.          Physician Progress Notes (last 4 days)        Tonya Valencia MD at 01/17/24 0839          [facility]  Obstetric Progress Note    Subjective     Patient:    The patient feels comfortable. She denies headache, vision changes or abdominal pain. She denies soa. She is feeling fetal movement.      Objective     Vital Signs Range for the last 24 hours  Temp:  [97.8 °F (36.6 °C)-98.3 °F (36.8 °C)] 98.2 °F (36.8 °C)   Temp src: Oral   BP: (125-177)/(68-99) 133/77   Heart Rate:  [] 91   Resp:  [15-17] 16   SpO2:  [96 %-100 %] 96 %       Device (Oxygen Therapy): room air           Flowsheet Rows      Flowsheet Row First Filed Value   Admission Height 162.6 cm (64\") Documented at 01/11/2024 1646   Admission Weight 119 kg (263 lb) Documented at 01/11/2024 1818            Intake/Output last 24 hours:      Intake/Output Summary (Last 24 hours) at 1/17/2024 0845  Last data filed at 1/17/2024 0725  Gross per 24 hour   Intake 241.22 ml   Output --   Net 241.22 ml       Intake/Output this shift:    I/O this shift:  In: 241.2 [I.V.:241.2]  Out: -     Physical Exam:  General: Patient is comfortable and in no acute distress   Heart CVS exam: normal rate and regular rhythm.   Lungs Chest: clear to auscultation, no wheezes, rales or rhonchi, symmetric air entry.     Abdomen Abdominal exam: graivd, soft.   Extremities Exam of extremities: bilateral lower ext with trace edema; no cords or tenderness; 2+ DTR's     Presentation: Vtx confirmed with bedside u/s   Cervix: Exam by: Method: sterile exam per physician (Dr. Valencia)   Dilation: Cervical Dilation (cm): 0   Effacement: Cervical Effacement: 0-10%   Station: Fetal Station: 0-->-3         Fetal Heart Rate Assessment   Method: Fetal HR Assessment Method: external   Beats/min: Fetal HR (beats/min): 135   Baseline: Fetal HR Baseline: normal range   Variability: Fetal HR Variability: moderate (amplitude range 6 to 25 bpm)   Accels: Fetal HR Accelerations: " lasting at least 15 seconds, greater than/equal to 15 bpm   Decels: Fetal HR Decelerations: absent   Tracing Category:       Uterine Assessment   Method: Method: external tocotransducer   Frequency (min): Contraction Frequency (Minutes): 4-7   Ctx Count in 10 min: Contractions in 10 Minutes: 0   Duration:     Intensity: Contraction Intensity: mild by palpation   Intensity by IUPC:     Resting Tone: Uterine Resting Tone: soft by palpation   Resting Tone by IUPC:     [place] Units:         Assessment & Plan       Pregnancy    33 weeks gestation of pregnancy    Rh negative status during pregnancy in first trimester    Group B streptococcal bacteriuria    Preeclampsia    Insulin controlled gestational diabetes mellitus (GDM) in third trimester        Assessment:  1.  Intrauterine pregnancy at 33w4d gestation with reassuring fetal status.    2.  Severe preeclampsia: pt with recurrent severely elevated BP's overnight requiring treatment. She has been transferred to L&D and started on magnesium sulfate this AM. Recommend proceeding with delivery as advised by JUSTICE MARIE for recurrent severe range BP's. Pt notes agreement regarding delivery. Reviewed options of labor induction vs . Discussed that cervix is unfavorable but labor induction is reasonable as her current BP values on magnesium sulfate are stable and fetal status is stable. Discussed risks of  to include bleeding, transfusion, infection, damage to internal organs, need for additional surgeries due to complications, DVT/PE and death. Patient is aware that  may become indicated during induction for unstable fetal or maternal status. Reviewed options for induction with cervidil or cytotec. Risks and benefits of each discussed. Discussed cervical balloon but not recommended at this time given current exam.   3.  Obstetrical history significant for is non-contributory.  4.  GBS status:   External Strep Group B Ag   Date Value Ref Range Status  "  2023 Positive  Final       Plan:  1. Recommend delivery. Pt and spouse are considering options of labor induction vs .  2. Plan of care has been reviewed with patient and spouse  3.  Risks, benefits of treatment plan have been discussed.  4.  All questions have been answered.      Tonya Valencia MD  2024  08:45 EST        Electronically signed by Tonya Valencia MD at 24 0847       Mony Stock MD at 24 1204          [facility]  Obstetric Progress Note    Chief Complaint   Patient presents with    Elevated Blood Pressure     Pt sent from office for bp check and labs       Subjective     Patient:    The patient feels well. No headache. No contractions.  Pt is anxious as this morning some severe range BPs requiring hypertensive protocol.      Review of Systems - ROS:  No fever or chills, no nausea or vomiting, no contractions, no leg pain, no LE edema, no leaking fluid, no bleeding, no headache, no dysuria        Objective     Vital Signs Range for the last 24 hours  Temp:  [97.7 °F (36.5 °C)-98.3 °F (36.8 °C)] 98.3 °F (36.8 °C)   Temp src: Oral   BP: (142-173)/() 144/74   Heart Rate:  [53-86] 66   Resp:  [16] 16   SpO2:  [97 %-98 %] 97 %       Device (Oxygen Therapy): room air           Flowsheet Rows      Flowsheet Row First Filed Value   Admission Height 162.6 cm (64\") Documented at 2024 1646   Admission Weight 119 kg (263 lb) Documented at 2024 1818            Intake/Output last 24 hours:    No intake or output data in the 24 hours ending 24 1204    Intake/Output this shift:    No intake/output data recorded.    Physical Exam:  General: Patient is comfortable, well appearing, and in no acute distress   Heart CVS exam: normal rate and regular rhythm.   Lungs Chest: no tachypnea, retractions or cyanosis.     Abdomen Abdominal exam: gravid, non tender.   Extremities Exam of extremities: pedal edema 1 +     Presentation: Vertex by US " today   Cervix: Exam by:     Dilation:     Effacement:     Station:           Fetal Heart Rate Assessment   Method: Fetal HR Assessment Method: external   Beats/min: Fetal HR (beats/min): 145   Baseline: Fetal HR Baseline: normal range   Varibility: Fetal HR Variability: moderate (amplitude range 6 to 25 bpm)   Accels: Fetal HR Accelerations: lasting at least 15 seconds, greater than/equal to 15 bpm   Decels: Fetal HR Decelerations: absent   Tracing Category:       Uterine Assessment   Method: Method: palpation, per patient report, external tocotransducer   Frequency (min): Contraction Frequency (Minutes): x1   Ctx Count in 10 min:     Duration:     Intensity: Contraction Intensity: no contractions   Intensity by IUPC:     Resting Tone: Uterine Resting Tone: soft by palpation   Resting Tone by IUPC:     [place] Units:       Lab Results (last 24 hours)       Procedure Component Value Units Date/Time    POC Glucose Once [[id number]]  (Abnormal) Collected: 01/16/24 1154    Specimen: Blood Updated: 01/16/24 1156     Glucose 54 mg/dL     Comprehensive Metabolic Panel [[id number]]  (Abnormal) Collected: 01/16/24 1020    Specimen: Blood Updated: 01/16/24 1100     Glucose 71 mg/dL      BUN 9 mg/dL      Creatinine 0.64 mg/dL      Sodium 136 mmol/L      Potassium 4.0 mmol/L      Chloride 105 mmol/L      CO2 21.0 mmol/L      Calcium 9.2 mg/dL      Total Protein 5.4 g/dL      Albumin 3.3 g/dL      ALT (SGPT) 21 U/L      AST (SGOT) 16 U/L      Alkaline Phosphatase 97 U/L      Total Bilirubin 0.2 mg/dL      Globulin 2.1 gm/dL      A/G Ratio 1.6 g/dL      BUN/Creatinine Ratio 14.1     Anion Gap 10.0 mmol/L      eGFR 122.1 mL/min/1.73     Narrative:      GFR Normal >60  Chronic Kidney Disease <60  Kidney Failure <15      Lactate Dehydrogenase [[id number]]  (Normal) Collected: 01/16/24 1020    Specimen: Blood Updated: 01/16/24 1100      U/L     Uric Acid [[id number]]  (Abnormal) Collected: 01/16/24 1020    Specimen: Blood  Updated: 01/16/24 1100     Uric Acid 6.9 mg/dL     CBC & Differential [[id number]]  (Abnormal) Collected: 01/16/24 1020    Specimen: Blood Updated: 01/16/24 1041    Narrative:      The following orders were created for panel order CBC & Differential.  Procedure                               Abnormality         Status                     ---------                               -----------         ------                     CBC Auto Differential[[id number]]        Abnormal            Final result                 Please view results for these tests on the individual orders.    CBC Auto Differential [[id number]]  (Abnormal) Collected: 01/16/24 1020    Specimen: Blood Updated: 01/16/24 1041     WBC 12.62 10*3/mm3      RBC 4.53 10*6/mm3      Hemoglobin 12.8 g/dL      Hematocrit 38.9 %      MCV 85.9 fL      MCH 28.3 pg      MCHC 32.9 g/dL      RDW 12.0 %      RDW-SD 37.6 fl      MPV 11.3 fL      Platelets 238 10*3/mm3      Neutrophil % 63.8 %      Lymphocyte % 26.5 %      Monocyte % 8.7 %      Eosinophil % 0.2 %      Basophil % 0.2 %      Immature Grans % 0.6 %      Neutrophils, Absolute 8.05 10*3/mm3      Lymphocytes, Absolute 3.34 10*3/mm3      Monocytes, Absolute 1.10 10*3/mm3      Eosinophils, Absolute 0.03 10*3/mm3      Basophils, Absolute 0.03 10*3/mm3      Immature Grans, Absolute 0.07 10*3/mm3      nRBC 0.0 /100 WBC     POC Glucose Once [[id number]]  (Normal) Collected: 01/16/24 0939    Specimen: Blood Updated: 01/16/24 0940     Glucose 72 mg/dL     POC Glucose Once [[id number]]  (Abnormal) Collected: 01/16/24 0651    Specimen: Blood Updated: 01/16/24 0652     Glucose 60 mg/dL     POC Glucose Once [[id number]]  (Normal) Collected: 01/15/24 2133    Specimen: Blood Updated: 01/15/24 2134     Glucose 80 mg/dL     POC Glucose Once [[id number]]  (Abnormal) Collected: 01/15/24 1933    Specimen: Blood Updated: 01/15/24 1934     Glucose 143 mg/dL     POC Glucose Once [[id number]]  (Abnormal) Collected: 01/15/24 1805    Specimen:  Blood Updated: 01/15/24 1806     Glucose 65 mg/dL     POC Glucose Once [[id number]]  (Normal) Collected: 01/15/24 1331    Specimen: Blood Updated: 01/15/24 1332     Glucose 85 mg/dL     POC Glucose Once [[id number]]  (Abnormal) Collected: 01/15/24 1203    Specimen: Blood Updated: 01/15/24 1204     Glucose 67 mg/dL                 Assessment & Plan       Pregnancy    33 weeks gestation of pregnancy    Rh negative status during pregnancy in first trimester    Group B streptococcal bacteriuria    Preeclampsia    Insulin controlled gestational diabetes mellitus (GDM) in third trimester    Breech presentation        Assessment:  1.  Intrauterine pregnancy at 33w3d weeks gestation with reactive fetal status.    2.  pre-eclampsia severe- severe range BP but responsive to protocol, no headache.   3. Gestational diabetes controlled by insulin, decent control  4.  GBS status: positive by urine culture this pregnancy    Plan:  1. fetal and uterine monitoring  intermittent and maternal labs  Delivery at 34 weeks, sooner if worsening hypertension or becomes symptomatic  2. Plan of care has been reviewed with patient and family, MFM consult appreciated-   3. Continue careful hospital management.         Mony Stock MD 2024 12:04 EST     Electronically signed by Mony Stock MD at 24 1209       Mraía Mcnair MD at 24 1027          MATERNAL FETAL MEDICINE Consult Note    Dear Dr Paloma Rodriguez MD:    Thank you for your kind referral of Desi Espinoza.  As you know, she is a 28 y.o.   at  33 3/7 weeks gestation (Estimated Date of Delivery: 3/2/24). This is a consult.      Her antepartum course is complicated by:  Preeclampsia now WITH SF  GDMA2 on insulin    Aneuploidy Screening: low risk    HPI: Today, she denies headache, blurry vision, RUQ pain. No vaginal bleeding, no contractions.     Review of History:  Past Medical History:   Diagnosis Date    Gestational diabetes      Gestational hypertension     Preeclampsia 1/11/2024    Pregnancy 01/11/2024    Seasonal allergies      Past Surgical History:   Procedure Laterality Date    NO PAST SURGERIES           Social History     Socioeconomic History    Marital status:    Tobacco Use    Smoking status: Never    Smokeless tobacco: Never   Vaping Use    Vaping Use: Never used   Substance and Sexual Activity    Alcohol use: Not Currently     Comment: states once a month    Drug use: No    Sexual activity: Yes     Partners: Male     Birth control/protection: None     Comment: Dong     Family History   Problem Relation Age of Onset    Diabetes Father     Diabetes Mother     Breast cancer Mother     Polycystic ovary syndrome Sister     Congenital heart disease Neg Hx       No Known Allergies   No current facility-administered medications on file prior to encounter.     Current Outpatient Medications on File Prior to Encounter   Medication Sig Dispense Refill    Blood Glucose Monitoring Suppl (Accu-Chek Guide Me) w/Device kit       Glucose Blood (Blood Glucose Test) strip Use to check glucose seven times daily and as needed. 300 each 11    glucose monitor monitoring kit Use four times daily to check blood glucose. 1 each 0    insulin aspart (novoLOG FLEXPEN) 100 UNIT/ML solution pen-injector sc pen Inject subcutaneously 25 units with breakfast and 20 units with dinner. 16 mL 11    insulin NPH (humuLIN N,novoLIN N) 100 UNIT/ML injection Inject subcutaneously 52 units in the morning and 20 units at bedtime. 8 each 12    Insulin Pen Needle (B-D UF III MINI PEN NEEDLES) 31G X 5 MM misc Use four times daily to inject insulin. 200 each 11    Lancets misc Use to check glucose seven times daily and as needed. 300 each 11    montelukast (SINGULAIR) 10 MG tablet Take 1 tablet by mouth Daily. 90 tablet 3    NovoLIN N FlexPen 100 UNIT/ML injection       omeprazole (priLOSEC) 40 MG capsule Take 1 capsule by mouth Daily. 90 capsule 3    ondansetron  (Zofran) 4 MG tablet Take 1 tablet by mouth Every 6 (Six) Hours As Needed for Nausea or Vomiting. 30 tablet 1    prenatal vitamin (prenatal, CLASSIC, vitamin) tablet Take  by mouth Daily.      Glucagon 0.5 MG/0.1ML solution auto-injector Inject 0.1 mL under the skin into the appropriate area as directed 1 (One) Time As Needed (severe hypoglycemia) for up to 1 dose. 0.1 mL 1    Loratadine-Pseudoephedrine (CLARITIN-D 24 HOUR PO) Take  by mouth.      nystatin (MYCOSTATIN) 263824 UNIT/GM powder Apply  topically to the appropriate area as directed 3 (Three) Times a Day. 30 g 2        Past obstetric, gynecological, medical, surgical, family and social history reviewed.  Relevant lab work and imaging reviewed.    Review of systems  Constitutional:  denies fever, chills, malaise.   ENT/Mouth:  denies sore throat, tinnitus  Eyes: denies vision changes/pain  CV:  denies chest pain  Respiratory:  denies cough/SOB  GI:  denies N/V, diarrhea, abdominal pain.    :   denies dysuria  Skin:  denies lesions or pruritus   Neuro:  denies weakness, focal neurologic symptoms    Vitals:    24 1011 24 1012 24 1021 24 1022   BP:  147/74  159/77   BP Location:    Left arm   Patient Position:    Lying   Pulse: 74 71 77 70   Resp:  16  16   Temp:       TempSrc:       SpO2: 98% 98% 98%    Weight:       Height:           PHYSICAL EXAM   GENERAL: Not in acute distress, AAOx3, pleasant  CARDIO: regular rate and rhythm  PULM: symmetric chest rise, speaking in complete sentences without difficulty  NEURO: awake, alert and oriented to person, place, and time  ABDOMINAL: No fundal tenderness, no rebound or guarding, gravid  EXTREMITIES: no bilateral lower extremity edema/tenderness  SKIN: Warm, well-perfused      ULTRASOUND   Please view full ultrasound note on Imaging tab in [employer].      ASSESSMENT/COUNSELIN y.o.   at  33 3/7 weeks gestation (Estimated Date of Delivery: 3/2/24).     -Pregnancy  [ X ] stable  [    ] improving [  ] worsening    There are no diagnoses linked to this encounter.     Pre-eclampsia, now WITH SF based on Bps:  Previously counseled.  Now WITH SF based on BP's requiring treatment.       She is no longer a candidate for outpatient management and I recommend delivery by 34 weeks unless indicated sooner by   worsening lab values that indicate severe pre-eclampsia (thrombocytopenia <100, twice normal LFT's, Cr >1.1), pulmonary edema, certainly eclampsia, severe BP unable to be controled with IV medications, or HA unresponsive to medications, or fetal distress.      Her severe diagnosis happened after I saw her this morning, but discussed with Dr. Fuller--appreciate her care.      GDMA2:  On NPH 10/22 and regular insulin 24/0/8--decreased long acting today a little due to lows.      Check glucose 7x per day (fasting, preprandial, 1 hour postprandial, HS)         Plan  -BID NST as long as fetal status reassuring  -S/p steroids.   -Recommend PIH labs at least 2-3x per week  -Would recommend inpatient management now that had a sustained SR requiring treatment/protocol.    -Will monitor sugars  -US while inpatient Tu/Fri  -Right now delivery indicated at 34 weeks.  Low threshold for worsening preeclampsia sooner.       Will continue to follow along.  Please call with any questions.       Thank you for the consult and opportunity to care for this patient.  Please feel free to reach out with any questions or concerns.      I spent 30 minutes caring for this patient on this date of service. This time includes time spent by me in the following activities: preparing for the visit, reviewing tests, obtaining and/or reviewing a separately obtained history, performing a medically appropriate examination and/or evaluation, counseling and educating the patient/family/caregiver and independently interpreting results and communicating that information with the patient/family/caregiver with greater than 50% spent in  counseling and coordination of care.       I spent 3 minutes on the separately reported service of US imaging not included in the time used to support the E/M service also reported today.      María Mcnair MD FACOG  Maternal Fetal Medicine-[facility]  Office: [phone]  [email or web address]          Electronically signed by María Mcnair MD at 24 1135       Teena Pa MD at 01/15/24 1220          [facility]   Obstetrics and Gynecology   Antepartum Note      1/15/2024    Name:Desi Espinoza    MR#:[id number]    Progress note                        HD:1    Subjective     Chief Complaint   Patient presents with    Elevated Blood Pressure     Pt sent from office for bp check and labs       28 y.o. yo Female  at 33w2d presents with preeclampsia without severe features.  She has been stable since admission with mild range blood pressure and no severe features.  She has received BMZ.  MFM is following for management of GDMA2, insulin-controlled.      She feels well today.   Denies contractions, loss of fluid, vaginal bleeding.  Endorses regular fetal movements.    Denies headache, blurry vision, shortness of air, right upper quadrant/midepigastric pain.       Patient Active Problem List   Diagnosis    33 weeks gestation of pregnancy    Rh negative status during pregnancy in first trimester    Group B streptococcal bacteriuria    Environmental allergies    Pregnancy    Preeclampsia    Insulin controlled gestational diabetes mellitus (GDM) in third trimester    Breech presentation        Review of system  Review of Systems   All other systems reviewed and are negative.      Objective    Vitals  Temp:  Temp:  [97.7 °F (36.5 °C)-97.8 °F (36.6 °C)] 97.8 °F (36.6 °C)  Temp src: Oral  BP:  BP: (135-157)/(72-86) 147/72  Pulse:  Heart Rate:  [54-75] 58  RR:   Resp:  [16-20] 16    Wt Readings from Last 3 Encounters:   01/15/24 118 kg (260 lb 9.6 oz)   24 119 kg  (263 lb)   01/09/24 119 kg (263 lb 3.2 oz)       Body mass index is 44.73 kg/m².    Exam:    Physical Exam  Vitals and nursing note reviewed.   Constitutional:       General: She is not in acute distress.     Appearance: Normal appearance.   HENT:      Head: Normocephalic and atraumatic.   Eyes:      Extraocular Movements: Extraocular movements intact.   Cardiovascular:      Rate and Rhythm: Normal rate.   Pulmonary:      Effort: Pulmonary effort is normal. No respiratory distress.   Abdominal:      General: There is no distension.      Palpations: Abdomen is soft. There is no mass.      Tenderness: There is no abdominal tenderness.   Musculoskeletal:         General: Normal range of motion.      Cervical back: Normal range of motion.   Skin:     General: Skin is warm and dry.   Neurological:      General: No focal deficit present.      Mental Status: She is alert and oriented to person, place, and time.      Deep Tendon Reflexes: Reflexes normal.   Psychiatric:         Mood and Affect: Mood normal.         Behavior: Behavior normal.         No intake/output data recorded.    Results from last 7 days   Lab Units 01/15/24  0529 01/14/24  0601 01/13/24  0413   WBC 10*3/mm3 12.25* 13.19* 13.15*   HEMOGLOBIN g/dL 11.9* 13.3 12.6   HEMATOCRIT % 35.0 39.9 37.4   PLATELETS 10*3/mm3 215 257 223     Results from last 7 days   Lab Units 01/15/24  0529 01/14/24  0601 01/13/24  0413   SODIUM mmol/L 138 135* 135*   POTASSIUM mmol/L 4.1 4.0 3.8   CHLORIDE mmol/L 106 103 104   CO2 mmol/L 21.4* 21.0* 19.0*   BUN mg/dL 10 10 8   CREATININE mg/dL 0.55* 0.59 0.59   CALCIUM mg/dL 8.2* 8.8 8.7   BILIRUBIN mg/dL <0.2 <0.2 0.2   ALK PHOS U/L 83 104 87   ALT (SGPT) U/L 17 18 17   AST (SGOT) U/L 18 18 15   GLUCOSE mg/dL 61* 98 89       Fetal Tracing:  Reactive, Category I         Assessment/Plan  Intrauterine pregnancy at 33w2d presents with preeclampsia without severe features    Pregnancy    33 weeks gestation of pregnancy    Rh negative  status during pregnancy in first trimester    Group B streptococcal bacteriuria    Preeclampsia    Insulin controlled gestational diabetes mellitus (GDM) in third trimester    Breech presentation    1.  Preeclampsia without severe features -no signs of severe preeclampsia, HELLP labs changed to twice weekly unless clinically significant change occurs, s/p BMZ  2.  Gestational diabetes -continue insulin and management per [facility]  3.  Breech presentation  4.  Obesity -Lovenox ordered for DVT prophylaxis but patient declined, SCDs ordered  5.  FWB - NST twice daily, BPP Tues/Fri     Dispo: continue inpatient admission      Teena Pa MD  1/15/2024 12:21 EST        Electronically signed by Teena Pa MD at 01/15/24 1225       NuHaroon johnson MD at 24 1104          ANTEPARTUM ROUNDING NOTE     Admission date 2024     SUBJECTIVE:     Desi Espinoza is a 28 y.o.,  33w1d admitted for preeclampsia without severe features.  Patient has received betamethasone course. Denies vaginal bleeding, leakage of fluid, or contractions.  The patient notes good fetal movement.     OBJECTIVE:     Vitals:   Vitals:    24 2340 24 0410 24 0552 24 0937   BP: 149/81 140/71  154/77   BP Location: Left arm Left arm  Left arm   Patient Position: Sitting Lying  Sitting   Pulse: 60 72  71   Resp: 16 16  18   Temp: 97.5 °F (36.4 °C) 98.2 °F (36.8 °C)  97.9 °F (36.6 °C)   TempSrc: Oral Oral  Oral   SpO2: 98% 99%  98%   Weight:   118 kg (260 lb)    Height:           Results from last 7 days   Lab Units 24  0601   WBC 10*3/mm3 13.19*   HEMOGLOBIN g/dL 13.3   HEMATOCRIT % 39.9   PLATELETS 10*3/mm3 257     Results from last 7 days   Lab Units 24  0601   SODIUM mmol/L 135*   POTASSIUM mmol/L 4.0   CHLORIDE mmol/L 103   CO2 mmol/L 21.0*   BUN mg/dL 10   CREATININE mg/dL 0.59   CALCIUM mg/dL 8.8   BILIRUBIN mg/dL <0.2   ALK PHOS U/L 104   ALT (SGPT) U/L 18   AST (SGOT) U/L 18   GLUCOSE mg/dL  98       Fetal heart rate tracing-reactive and category 1  Ultrasound on  showed estimated fetal weight of 4 pounds 11 ounces at the 52nd percentile.  Abdominal circumference at the 75th percentile.  Baby is now vertex.  BPP was 8 out of 8.    EXAM:  Appearance/Psychiatric: In no distress   Constitutional: The patient is well nourished   Cardiovascular: She does not have edema.    Respiratory: Respiratory effort is normal.   Abdomen: Soft, gravid, and nontender   Ext: nontender, no edema.     ASSESSMENT AND PLAN:     Pregnancy    33 weeks gestation of pregnancy    Rh negative status during pregnancy in first trimester    Group B streptococcal bacteriuria    Preeclampsia    Insulin controlled gestational diabetes mellitus (GDM) in third trimester    Breech presentation    28-year-old  1 para 0 at 33-1/7 weeks with preeclampsia without severe features    Preeclampsia without severe features-blood pressures are in mild range today.  Labs are normal  Gestational diabetes-continue insulin  Vertex presentation  Obesity-the patient was started on Lovenox yesterday but she declined.  We discussed reasoning for DVT prophylaxis.  Patient is using her SCDs.  I will change the patient to heparin.  Patient states she will consider.  Fetal wellbeing-NST twice daily.  BPP's on Tuesday and Friday.    Electronically signed by Haroon Barroso MD at 24 1143       Teena Pa MD at 24 1152          [facility]   Obstetrics and Gynecology   Antepartum Note      2024    Name:Desi Espinoza    MR#:[id number]    Progress note                        HD:1    Subjective     Chief Complaint   Patient presents with    Elevated Blood Pressure     Pt sent from office for bp check and labs       28 y.o. yo Female  at 33w0d admitted for preeclampsia without severe features.  She was admitted 2 days ago with mild range blood pressures.  Denies headache, blurry vision, shortness of air,  right upper quadrant/midepigastric pain.  She has been seen by [facility] who recommended inpatient monitoring for at least a week but potentially until delivery.  Patient is due to receive second dose of betamethasone today.  Fetal monitoring has been reassuring.  Denies contractions, loss of fluid, vaginal bleeding.  Endorses regular fetal movements.    Patient also has gestational diabetes which is managed by [facility] and controlled with insulin.     She has a baby shower later today and therapeutic pass has been approved by [facility] and primary OB.    Patient Active Problem List   Diagnosis    33 weeks gestation of pregnancy    Rh negative status during pregnancy in first trimester    Group B streptococcal bacteriuria    Environmental allergies    Pregnancy    Preeclampsia    Insulin controlled gestational diabetes mellitus (GDM) in third trimester    Breech presentation        Review of system  Review of Systems   All other systems reviewed and are negative.      Objective    Vitals  Temp:  Temp:  [97.8 °F (36.6 °C)-98.1 °F (36.7 °C)] 97.9 °F (36.6 °C)  Temp src: Oral  BP:  BP: (135-154)/(76-88) 141/86  Pulse:  Heart Rate:  [71-78] 75  RR:   Resp:  [16-18] 18    Wt Readings from Last 3 Encounters:   01/11/24 119 kg (263 lb)   01/11/24 119 kg (263 lb)   01/09/24 119 kg (263 lb 3.2 oz)       Body mass index is 45.14 kg/m².    Exam:    Physical Exam  Vitals and nursing note reviewed.   Constitutional:       General: She is not in acute distress.     Appearance: Normal appearance.   HENT:      Head: Normocephalic and atraumatic.   Eyes:      Extraocular Movements: Extraocular movements intact.   Cardiovascular:      Rate and Rhythm: Normal rate.   Pulmonary:      Effort: Pulmonary effort is normal. No respiratory distress.   Abdominal:      General: There is no distension.      Palpations: Abdomen is soft. There is no mass.      Tenderness: There is no abdominal tenderness.   Musculoskeletal:         General: Normal range of  motion.      Cervical back: Normal range of motion.   Skin:     General: Skin is warm and dry.   Neurological:      General: No focal deficit present.      Mental Status: She is alert and oriented to person, place, and time.      Deep Tendon Reflexes: Reflexes normal.   Psychiatric:         Mood and Affect: Mood normal.         Behavior: Behavior normal.         I/O last 3 completed shifts:  In: -   Out: 2800 [Urine:2800]    Results from last 7 days   Lab Units 01/13/24 0413 01/12/24  0701 01/11/24  1653   WBC 10*3/mm3 13.15* 9.97 12.84*   HEMOGLOBIN g/dL 12.6 12.4 12.4   HEMATOCRIT % 37.4 36.5 38.1   PLATELETS 10*3/mm3 223 217 231     Results from last 7 days   Lab Units 01/13/24 0413 01/12/24  0701 01/11/24  1653   SODIUM mmol/L 135* 134* 134*   POTASSIUM mmol/L 3.8 3.9 3.9   CHLORIDE mmol/L 104 103 102   CO2 mmol/L 19.0* 21.0* 20.0*   BUN mg/dL 8 7 10   CREATININE mg/dL 0.59 0.55* 0.53*   CALCIUM mg/dL 8.7 8.3* 8.5*   BILIRUBIN mg/dL 0.2 <0.2 <0.2   ALK PHOS U/L 87 89 91   ALT (SGPT) U/L 17 18 19   AST (SGOT) U/L 15 17 19   GLUCOSE mg/dL 89 75 80       Fetal Tracing:  Reactive, Category I         Assessment/Plan  Intrauterine pregnancy at 33w0d admitted with preeclampsia without severe features    Pregnancy    33 weeks gestation of pregnancy    Rh negative status during pregnancy in first trimester    Group B streptococcal bacteriuria    Preeclampsia    Insulin controlled gestational diabetes mellitus (GDM) in third trimester    Breech presentation    1.  Preeclampsia without severe features -no signs of severe preeclampsia, patient currently getting daily labs but will likely space soon if they stay stable    2.  Gestational diabetes -continue insulin and management per [facility]  3.  Breech presentation  4.  Obesity -Lovenox ordered for DVT prophylaxis  5.  FWB-NST twice daily    Dispo: Okay to have therapeutic pass today, BMZ prior to leaving, continue inpatient admission      Teena Pa MD  1/13/2024  11:58 EST        Electronically signed by Teena Pa MD at 01/13/24 1203       Consult Notes (last 72 hours)  Notes from 01/14/24 1105 through 01/17/24 1105   No notes of this type exist for this encounter.

## 2024-01-17 NOTE — NURSING NOTE
Pt has opted to have a c/s.  Currently, NICU is on diversion, but has three discharges pending, awaiting parent .  Dr. Valencia notified of pt decision and that pt remains stable.  MD states that she is fine doing c/s later this afternoon, when ORs are available and NICU has an open bed.  NICU and pt notified of plan.

## 2024-01-17 NOTE — ANESTHESIA PROCEDURE NOTES
[facility]        NAME: Eber Blas is a 3 y o  female  : 2015    MRN: [id number]  DATE: 2022  TIME: 4:31 PM    /66   Pulse (!) 111   Temp 99 6 °F (37 6 °C)   Resp 20   Ht 4' 6" (1 372 m)   Wt 40 8 kg (90 lb)   SpO2 98%   BMI 21 70 kg/m²     Assessment and Plan   Acute bacterial conjunctivitis, unspecified laterality [H10 30]  1  Acute bacterial conjunctivitis, unspecified laterality  gentamicin (GARAMYCIN) 0 3 % ophthalmic solution         Patient Instructions       Follow up with PCP in 3-5 days  Proceed to  ER if symptoms worsen  Chief Complaint     Chief Complaint   Patient presents with   • Eye Pain     Reports bilateral eye irritation and redness  Woke this AM with same  Mother reports had anallergic reaction 2 days ago and treated by PCP for same  History of Present Illness       Pt with redness and d/c to both eyes x 1 day       Review of Systems   Review of Systems   Constitutional: Negative  HENT: Negative  Eyes: Positive for discharge and redness  Respiratory: Negative  Cardiovascular: Negative  Gastrointestinal: Negative  Endocrine: Negative  Genitourinary: Negative  Musculoskeletal: Negative  Skin: Negative  Allergic/Immunologic: Negative  Neurological: Negative  Hematological: Negative  Psychiatric/Behavioral: Negative  All other systems reviewed and are negative          Current Medications       Current Outpatient Medications:   •  gentamicin (GARAMYCIN) 0 3 % ophthalmic solution, Administer 2 drops to both eyes 4 (four) times a day for 10 days, Disp: 5 mL, Rfl: 0  •  hydrOXYzine (ATARAX) 10 mg/5 mL syrup, Take 5 mg by mouth 4 (four) times a day as needed, Disp: , Rfl:     Current Allergies     Allergies as of 10/13/2022   • (No Known Allergies)            The following portions of the patient's history were reviewed and updated as appropriate: allergies, current medications, past family Spinal Block      Patient reassessed immediately prior to procedure    Patient location during procedure: OR  Start Time: 1/17/2024 6:25 PM  Stop Time: 1/17/2024 6:32 PM  Indication:at surgeon's request  Performed By  Anesthesiologist: Ronnell Shepherd MD  Preanesthetic Checklist  Completed: patient identified, IV checked, site marked, risks and benefits discussed, surgical consent, monitors and equipment checked, pre-op evaluation and timeout performed  Spinal Block Prep:  Patient Position:sitting  Sterile Tech:cap, gloves, gown, mask and sterile barriers  Prep:Chloraprep  Patient Monitoring:blood pressure monitoring, continuous pulse oximetry and EKG    Spinal Block Procedure  Approach:midline  Guidance:landmark technique  Location:L3-L4  Needle Type:Sherry  Needle Gauge:25 G  Placement of Spinal needle event:cerebrospinal fluid aspirated  Paresthesia: no  Fluid Appearance:clear  Medications: fentaNYL citrate (PF) (SUBLIMAZE) injection - Intrathecal   15 mcg - 1/17/2024 6:25:00 PM  bupivacaine PF (MARCAINE) 0.75 % injection - Spinal   1.6 mL - 1/17/2024 6:25:00 PM   Post Assessment  Patient Tolerance:patient tolerated the procedure well with no apparent complications  Complications no             history, past medical history, past social history, past surgical history and problem list      Past Medical History:   Diagnosis Date   • Known health problems: none        Past Surgical History:   Procedure Laterality Date   • NO PAST SURGERIES         Family History   Problem Relation Age of Onset   • No Known Problems Mother    • No Known Problems Father          Medications have been verified  Objective   /66   Pulse (!) 111   Temp 99 6 °F (37 6 °C)   Resp 20   Ht 4' 6" (1 372 m)   Wt 40 8 kg (90 lb)   SpO2 98%   BMI 21 70 kg/m²        Physical Exam     Physical Exam  Vitals and nursing note reviewed  Constitutional:       General: She is active  Appearance: Normal appearance  She is well-developed and normal weight  HENT:      Head: Normocephalic and atraumatic  Right Ear: Tympanic membrane, ear canal and external ear normal       Left Ear: Tympanic membrane, ear canal and external ear normal       Nose: Nose normal       Mouth/Throat:      Mouth: Mucous membranes are moist       Pharynx: Oropharynx is clear  Eyes:      Extraocular Movements: Extraocular movements intact  Pupils: Pupils are equal, round, and reactive to light  Comments: + red reflex, anterior chamber wnl  Conj injected  Yellow d/c  Lids wnl    Cardiovascular:      Rate and Rhythm: Normal rate and regular rhythm  Pulses: Normal pulses  Heart sounds: Normal heart sounds  Pulmonary:      Effort: Pulmonary effort is normal       Breath sounds: Normal breath sounds  Abdominal:      General: Abdomen is flat  Bowel sounds are normal       Palpations: Abdomen is soft  Musculoskeletal:         General: Normal range of motion  Cervical back: Normal range of motion and neck supple  Skin:     General: Skin is warm  Capillary Refill: Capillary refill takes less than 2 seconds  Neurological:      General: No focal deficit present        Mental Status: She is alert and oriented for age    Psychiatric:         Mood and Affect: Mood normal          Behavior: Behavior normal

## 2024-01-17 NOTE — NURSING NOTE
María AMEZCUA called again and informed of need for consult due to patient now being transferred to L&D for magnesium sulfate due to severe range blood pressures and need for continued HTN protocol.

## 2024-01-17 NOTE — NON STRESS TEST
Desi Espinoza, a  at 33w3d with an ARACELY of 3/2/2024, by Last Menstrual Period, was seen at [facility] ANTEPARTUM UNIT for a nonstress test.    Chief Complaint   Patient presents with    Elevated Blood Pressure     Pt sent from office for bp check and labs       Patient Active Problem List   Diagnosis    33 weeks gestation of pregnancy    Rh negative status during pregnancy in first trimester    Group B streptococcal bacteriuria    Environmental allergies    Pregnancy    Preeclampsia    Insulin controlled gestational diabetes mellitus (GDM) in third trimester    Breech presentation     Extended monitoring due to prolonged deceleration [id number] from baseline 145bpm to 120bpm. FHR recovered to cat 1 tracing with no more prolonged decelerations  Start Time:   Stop Time:     Interpretation A  Nonstress Test Interpretation A: Reactive         NST was reviewed:    Patient Active Problem List   Diagnosis    33 weeks gestation of pregnancy    Rh negative status during pregnancy in first trimester    Group B streptococcal bacteriuria    Environmental allergies    Pregnancy    Preeclampsia    Insulin controlled gestational diabetes mellitus (GDM) in third trimester    Breech presentation         33w4d    Indication for preeclampsia, gestational diabetes    NST is reassuring, Cat 1 tracing.      Recommendation:  continue monitoring while inpatient    Reviewed by Mony Fuller MD    00:27 EST

## 2024-01-17 NOTE — NURSING NOTE
Paged Dr Fuller. Informed pt has been transferred to ldr 14.  Ordered to start mag with 6gm load and 2gm maint now. Also ordered to perform SVE. Also ordered to continue with protocol

## 2024-01-17 NOTE — PLAN OF CARE
Problem: Adult Inpatient Plan of Care  Goal: Plan of Care Review  Outcome: Ongoing, Progressing  Goal: Patient-Specific Goal (Individualized)  Outcome: Ongoing, Progressing  Goal: Absence of Hospital-Acquired Illness or Injury  Outcome: Ongoing, Progressing  Intervention: Identify and Manage Fall Risk  Intervention: Prevent and Manage VTE (Venous Thromboembolism) Risk  Intervention: Prevent Infection  Goal: Optimal Comfort and Wellbeing  Outcome: Ongoing, Progressing  Intervention: Provide Person-Centered Care  Goal: Readiness for Transition of Care  Outcome: Ongoing, Progressing     Problem: Diabetes in Pregnancy  Goal: Blood Glucose Level Within Targeted Range  Outcome: Ongoing, Progressing  Intervention: Monitor and Manage Glycemia     Problem: Hypertensive Disorders in Pregnancy  Goal: Maternal-Fetal Stabilization  Outcome: Ongoing, Progressing  Intervention: Optimize Blood Pressure and Fluid Status  Intervention: Monitor and Manage Symptom Progression     Problem: Maternal-Fetal Wellbeing  Goal: Optimal Maternal-Fetal Wellbeing  Outcome: Ongoing, Progressing  Intervention: Promote and Monitor Maternal-Fetal Wellbeing  Intervention: Support Psychosocial Response to Complications During Pregnancy   Goal Outcome Evaluation:  Plan of Care Reviewed With: patient        Progress: declining  Outcome Evaluation: RNST, +FM. Severe range pressures/protocol utilized/pt asymptomatic. Transferred to labor and delivery. Denies ctx pain/vb/lof. Insulin and accuchecks as ordered. No abn range bs.

## 2024-01-17 NOTE — NURSING NOTE
[facility] called and informed of need for consult ASAP due to patient having severe range blood pressures this morning.  NNP reported they would consult on patient early this morning.

## 2024-01-17 NOTE — NURSING NOTE
Paged Dr Fuller.  Informed that pt has 2 severe range pressures. Hypertensive protocol to be initiated with labetalol 20mg iv

## 2024-01-17 NOTE — PROGRESS NOTES
"[facility]  Obstetric Progress Note    Subjective     Patient:    The patient feels comfortable. She denies headache, vision changes or abdominal pain. She denies soa. She is feeling fetal movement.      Objective     Vital Signs Range for the last 24 hours  Temp:  [97.8 °F (36.6 °C)-98.3 °F (36.8 °C)] 98.2 °F (36.8 °C)   Temp src: Oral   BP: (125-177)/(68-99) 133/77   Heart Rate:  [] 91   Resp:  [15-17] 16   SpO2:  [96 %-100 %] 96 %       Device (Oxygen Therapy): room air           Flowsheet Rows      Flowsheet Row First Filed Value   Admission Height 162.6 cm (64\") Documented at 01/11/2024 1646   Admission Weight 119 kg (263 lb) Documented at 01/11/2024 1818            Intake/Output last 24 hours:      Intake/Output Summary (Last 24 hours) at 1/17/2024 0845  Last data filed at 1/17/2024 0725  Gross per 24 hour   Intake 241.22 ml   Output --   Net 241.22 ml       Intake/Output this shift:    I/O this shift:  In: 241.2 [I.V.:241.2]  Out: -     Physical Exam:  General: Patient is comfortable and in no acute distress   Heart CVS exam: normal rate and regular rhythm.   Lungs Chest: clear to auscultation, no wheezes, rales or rhonchi, symmetric air entry.     Abdomen Abdominal exam: graivd, soft.   Extremities Exam of extremities: bilateral lower ext with trace edema; no cords or tenderness; 2+ DTR's     Presentation: Vtx confirmed with bedside u/s   Cervix: Exam by: Method: sterile exam per physician (Dr. Valencia)   Dilation: Cervical Dilation (cm): 0   Effacement: Cervical Effacement: 0-10%   Station: Fetal Station: 0-->-3         Fetal Heart Rate Assessment   Method: Fetal HR Assessment Method: external   Beats/min: Fetal HR (beats/min): 135   Baseline: Fetal HR Baseline: normal range   Variability: Fetal HR Variability: moderate (amplitude range 6 to 25 bpm)   Accels: Fetal HR Accelerations: lasting at least 15 seconds, greater than/equal to 15 bpm   Decels: Fetal HR Decelerations: absent   Tracing Category:  "      Uterine Assessment   Method: Method: external tocotransducer   Frequency (min): Contraction Frequency (Minutes): 4-7   Ctx Count in 10 min: Contractions in 10 Minutes: 0   Duration:     Intensity: Contraction Intensity: mild by palpation   Intensity by IUPC:     Resting Tone: Uterine Resting Tone: soft by palpation   Resting Tone by IUPC:     Suhail Units:         Assessment & Plan       Pregnancy    33 weeks gestation of pregnancy    Rh negative status during pregnancy in first trimester    Group B streptococcal bacteriuria    Preeclampsia    Insulin controlled gestational diabetes mellitus (GDM) in third trimester        Assessment:  1.  Intrauterine pregnancy at 33w4d gestation with reassuring fetal status.    2.  Severe preeclampsia: pt with recurrent severely elevated BP's overnight requiring treatment. She has been transferred to L&D and started on magnesium sulfate this AM. Recommend proceeding with delivery as advised by JUSTICE MARIE for recurrent severe range BP's. Pt notes agreement regarding delivery. Reviewed options of labor induction vs . Discussed that cervix is unfavorable but labor induction is reasonable as her current BP values on magnesium sulfate are stable and fetal status is stable. Discussed risks of  to include bleeding, transfusion, infection, damage to internal organs, need for additional surgeries due to complications, DVT/PE and death. Patient is aware that  may become indicated during induction for unstable fetal or maternal status. Reviewed options for induction with cervidil or cytotec. Risks and benefits of each discussed. Discussed cervical balloon but not recommended at this time given current exam.   3.  Obstetrical history significant for is non-contributory.  4.  GBS status:   External Strep Group B Ag   Date Value Ref Range Status   2023 Positive  Final       Plan:  1. Recommend delivery. Pt and spouse are considering options of labor induction  vs .  2. Plan of care has been reviewed with patient and spouse  3.  Risks, benefits of treatment plan have been discussed.  4.  All questions have been answered.      Tonya Valencia MD  2024  08:45 EST

## 2024-01-17 NOTE — ANESTHESIA PREPROCEDURE EVALUATION
Anesthesia Evaluation                  Airway   Mallampati: II  Dental      Pulmonary    (-) sleep apnea, not a smoker    ROS comment: Negative patient screen for CHANI    Cardiovascular         Neuro/Psych  GI/Hepatic/Renal/Endo    (+) diabetes mellitus gestational using insulin    Musculoskeletal     Abdominal    Substance History      OB/GYN    (+) Pregnant, Preeclampsia, pregnancy induced hypertension        Other        (-) blood dyscrasia              Anesthesia Plan    ASA 3     spinal     (Intrauterine pregnancy at 33w4d)    Anesthetic plan, risks, benefits, and alternatives have been provided, discussed and informed consent has been obtained with: patient.    CODE STATUS:    Code Status (Patient has no pulse and is not breathing): CPR (Attempt to Resuscitate)  Medical Interventions (Patient has pulse or is breathing): Full

## 2024-01-17 NOTE — NURSING NOTE
Paged Dr Fuller.  Informed that bp are remaining in severe range. Ordered to give hydralazine per protocol then transfer to labor and delivery

## 2024-01-18 PROBLEM — O14.13 SEVERE PRE-ECLAMPSIA IN THIRD TRIMESTER: Status: ACTIVE | Noted: 2024-01-11

## 2024-01-18 LAB
ALBUMIN SERPL-MCNC: 2.9 G/DL (ref 3.5–5.2)
ALBUMIN/GLOB SERPL: 1.4 G/DL
ALP SERPL-CCNC: 92 U/L (ref 39–117)
ALT SERPL W P-5'-P-CCNC: 17 U/L (ref 1–33)
ANION GAP SERPL CALCULATED.3IONS-SCNC: 10 MMOL/L (ref 5–15)
AST SERPL-CCNC: 18 U/L (ref 1–32)
BASOPHILS # BLD AUTO: 0.03 10*3/MM3 (ref 0–0.2)
BASOPHILS NFR BLD AUTO: 0.2 % (ref 0–1.5)
BILIRUB SERPL-MCNC: 0.2 MG/DL (ref 0–1.2)
BUN SERPL-MCNC: 9 MG/DL (ref 6–20)
BUN/CREAT SERPL: 13 (ref 7–25)
CALCIUM SPEC-SCNC: 6.8 MG/DL (ref 8.6–10.5)
CHLORIDE SERPL-SCNC: 98 MMOL/L (ref 98–107)
CO2 SERPL-SCNC: 19 MMOL/L (ref 22–29)
CREAT SERPL-MCNC: 0.69 MG/DL (ref 0.57–1)
DEPRECATED RDW RBC AUTO: 39.8 FL (ref 37–54)
EGFRCR SERPLBLD CKD-EPI 2021: 119.9 ML/MIN/1.73
EOSINOPHIL # BLD AUTO: 0 10*3/MM3 (ref 0–0.4)
EOSINOPHIL NFR BLD AUTO: 0 % (ref 0.3–6.2)
ERYTHROCYTE [DISTWIDTH] IN BLOOD BY AUTOMATED COUNT: 12.8 % (ref 12.3–15.4)
EXPIRATION DATE: ABNORMAL
EXPIRATION DATE: ABNORMAL
GLOBULIN UR ELPH-MCNC: 2.1 GM/DL
GLUCOSE BLDC GLUCOMTR-MCNC: 117 MG/DL (ref 70–130)
GLUCOSE BLDC GLUCOMTR-MCNC: 148 MG/DL (ref 70–130)
GLUCOSE BLDC GLUCOMTR-MCNC: 201 MG/DL (ref 70–130)
GLUCOSE SERPL-MCNC: 105 MG/DL (ref 65–99)
HCT VFR BLD AUTO: 36.4 % (ref 34–46.6)
HGB BLD-MCNC: 12.4 G/DL (ref 12–15.9)
IMM GRANULOCYTES # BLD AUTO: 0.09 10*3/MM3 (ref 0–0.05)
IMM GRANULOCYTES NFR BLD AUTO: 0.5 % (ref 0–0.5)
LYMPHOCYTES # BLD AUTO: 2.79 10*3/MM3 (ref 0.7–3.1)
LYMPHOCYTES NFR BLD AUTO: 14.5 % (ref 19.6–45.3)
Lab: ABNORMAL
Lab: ABNORMAL
MCH RBC QN AUTO: 29.7 PG (ref 26.6–33)
MCHC RBC AUTO-ENTMCNC: 34.1 G/DL (ref 31.5–35.7)
MCV RBC AUTO: 87.3 FL (ref 79–97)
MONOCYTES # BLD AUTO: 1.05 10*3/MM3 (ref 0.1–0.9)
MONOCYTES NFR BLD AUTO: 5.5 % (ref 5–12)
NEUTROPHILS NFR BLD AUTO: 15.24 10*3/MM3 (ref 1.7–7)
NEUTROPHILS NFR BLD AUTO: 79.3 % (ref 42.7–76)
NRBC BLD AUTO-RTO: 0 /100 WBC (ref 0–0.2)
PLATELET # BLD AUTO: 223 10*3/MM3 (ref 140–450)
PMV BLD AUTO: 11.1 FL (ref 6–12)
POTASSIUM SERPL-SCNC: 4.3 MMOL/L (ref 3.5–5.2)
PROT SERPL-MCNC: 5 G/DL (ref 6–8.5)
PROT UR STRIP-MCNC: ABNORMAL MG/DL
PROT UR STRIP-MCNC: ABNORMAL MG/DL
RBC # BLD AUTO: 4.17 10*6/MM3 (ref 3.77–5.28)
SODIUM SERPL-SCNC: 127 MMOL/L (ref 136–145)
URATE SERPL-MCNC: 7.9 MG/DL (ref 2.4–5.7)
WBC NRBC COR # BLD AUTO: 19.2 10*3/MM3 (ref 3.4–10.8)

## 2024-01-18 PROCEDURE — 81002 URINALYSIS NONAUTO W/O SCOPE: CPT | Performed by: OBSTETRICS & GYNECOLOGY

## 2024-01-18 PROCEDURE — 0503F POSTPARTUM CARE VISIT: CPT | Performed by: OBSTETRICS & GYNECOLOGY

## 2024-01-18 PROCEDURE — 25010000002 KETOROLAC TROMETHAMINE PER 15 MG: Performed by: OBSTETRICS & GYNECOLOGY

## 2024-01-18 PROCEDURE — 25010000002 MAGNESIUM SULFATE 20 GM/500ML SOLUTION: Performed by: OBSTETRICS & GYNECOLOGY

## 2024-01-18 PROCEDURE — 80053 COMPREHEN METABOLIC PANEL: CPT | Performed by: STUDENT IN AN ORGANIZED HEALTH CARE EDUCATION/TRAINING PROGRAM

## 2024-01-18 PROCEDURE — 25810000003 LACTATED RINGERS PER 1000 ML: Performed by: OBSTETRICS & GYNECOLOGY

## 2024-01-18 PROCEDURE — 25010000002 ENOXAPARIN PER 10 MG: Performed by: OBSTETRICS & GYNECOLOGY

## 2024-01-18 PROCEDURE — 25010000002 LABETALOL 5 MG/ML SOLUTION: Performed by: OBSTETRICS & GYNECOLOGY

## 2024-01-18 PROCEDURE — 82948 REAGENT STRIP/BLOOD GLUCOSE: CPT

## 2024-01-18 PROCEDURE — 85025 COMPLETE CBC W/AUTO DIFF WBC: CPT | Performed by: STUDENT IN AN ORGANIZED HEALTH CARE EDUCATION/TRAINING PROGRAM

## 2024-01-18 PROCEDURE — 84550 ASSAY OF BLOOD/URIC ACID: CPT | Performed by: STUDENT IN AN ORGANIZED HEALTH CARE EDUCATION/TRAINING PROGRAM

## 2024-01-18 RX ORDER — NIFEDIPINE 10 MG/1
30 CAPSULE ORAL ONCE
Status: DISCONTINUED | OUTPATIENT
Start: 2024-01-18 | End: 2024-01-18

## 2024-01-18 RX ORDER — MONTELUKAST SODIUM 10 MG/1
10 TABLET ORAL DAILY
Status: DISCONTINUED | OUTPATIENT
Start: 2024-01-18 | End: 2024-01-21 | Stop reason: HOSPADM

## 2024-01-18 RX ORDER — OXYCODONE HYDROCHLORIDE 5 MG/1
5 TABLET ORAL EVERY 4 HOURS PRN
Status: DISCONTINUED | OUTPATIENT
Start: 2024-01-18 | End: 2024-01-21 | Stop reason: HOSPADM

## 2024-01-18 RX ORDER — NALOXONE HCL 0.4 MG/ML
0.2 VIAL (ML) INJECTION
Status: DISCONTINUED | OUTPATIENT
Start: 2024-01-18 | End: 2024-01-21 | Stop reason: HOSPADM

## 2024-01-18 RX ORDER — NIFEDIPINE 30 MG/1
30 TABLET, EXTENDED RELEASE ORAL
Status: DISCONTINUED | OUTPATIENT
Start: 2024-01-18 | End: 2024-01-18

## 2024-01-18 RX ORDER — TRANEXAMIC ACID 10 MG/ML
1000 INJECTION, SOLUTION INTRAVENOUS ONCE AS NEEDED
Status: DISCONTINUED | OUTPATIENT
Start: 2024-01-18 | End: 2024-01-21 | Stop reason: HOSPADM

## 2024-01-18 RX ORDER — LORATADINE 10 MG/1
10 TABLET ORAL DAILY
Status: DISCONTINUED | OUTPATIENT
Start: 2024-01-18 | End: 2024-01-21 | Stop reason: HOSPADM

## 2024-01-18 RX ORDER — DOCUSATE SODIUM 100 MG/1
100 CAPSULE, LIQUID FILLED ORAL 2 TIMES DAILY
Status: DISCONTINUED | OUTPATIENT
Start: 2024-01-18 | End: 2024-01-21 | Stop reason: HOSPADM

## 2024-01-18 RX ORDER — SIMETHICONE 80 MG
80 TABLET,CHEWABLE ORAL 4 TIMES DAILY PRN
Status: DISCONTINUED | OUTPATIENT
Start: 2024-01-18 | End: 2024-01-21 | Stop reason: HOSPADM

## 2024-01-18 RX ORDER — KETOROLAC TROMETHAMINE 30 MG/ML
30 INJECTION, SOLUTION INTRAMUSCULAR; INTRAVENOUS EVERY 6 HOURS
Status: COMPLETED | OUTPATIENT
Start: 2024-01-18 | End: 2024-01-19

## 2024-01-18 RX ORDER — MISOPROSTOL 200 UG/1
600 TABLET ORAL ONCE AS NEEDED
Status: DISCONTINUED | OUTPATIENT
Start: 2024-01-18 | End: 2024-01-21 | Stop reason: HOSPADM

## 2024-01-18 RX ORDER — ENOXAPARIN SODIUM 100 MG/ML
40 INJECTION SUBCUTANEOUS EVERY 12 HOURS
Status: DISCONTINUED | OUTPATIENT
Start: 2024-01-18 | End: 2024-01-21 | Stop reason: HOSPADM

## 2024-01-18 RX ORDER — OXYCODONE HYDROCHLORIDE 10 MG/1
10 TABLET ORAL EVERY 4 HOURS PRN
Status: DISCONTINUED | OUTPATIENT
Start: 2024-01-18 | End: 2024-01-21 | Stop reason: HOSPADM

## 2024-01-18 RX ORDER — NIFEDIPINE 60 MG/1
60 TABLET, EXTENDED RELEASE ORAL
Status: DISCONTINUED | OUTPATIENT
Start: 2024-01-19 | End: 2024-01-18

## 2024-01-18 RX ORDER — SODIUM CHLORIDE, SODIUM LACTATE, POTASSIUM CHLORIDE, CALCIUM CHLORIDE 600; 310; 30; 20 MG/100ML; MG/100ML; MG/100ML; MG/100ML
75 INJECTION, SOLUTION INTRAVENOUS CONTINUOUS
Status: DISCONTINUED | OUTPATIENT
Start: 2024-01-18 | End: 2024-01-21 | Stop reason: HOSPADM

## 2024-01-18 RX ORDER — NIFEDIPINE 30 MG/1
30 TABLET, EXTENDED RELEASE ORAL ONCE
Status: COMPLETED | OUTPATIENT
Start: 2024-01-18 | End: 2024-01-18

## 2024-01-18 RX ORDER — NIFEDIPINE 60 MG/1
60 TABLET, EXTENDED RELEASE ORAL
Status: DISCONTINUED | OUTPATIENT
Start: 2024-01-19 | End: 2024-01-21 | Stop reason: HOSPADM

## 2024-01-18 RX ORDER — PRENATAL VIT/IRON FUM/FOLIC AC 27MG-0.8MG
1 TABLET ORAL DAILY
Status: DISCONTINUED | OUTPATIENT
Start: 2024-01-18 | End: 2024-01-21 | Stop reason: HOSPADM

## 2024-01-18 RX ORDER — KETOROLAC TROMETHAMINE 30 MG/ML
30 INJECTION, SOLUTION INTRAMUSCULAR; INTRAVENOUS EVERY 6 HOURS PRN
Status: DISCONTINUED | OUTPATIENT
Start: 2024-01-18 | End: 2024-01-18

## 2024-01-18 RX ORDER — CARBOPROST TROMETHAMINE 250 UG/ML
250 INJECTION, SOLUTION INTRAMUSCULAR ONCE AS NEEDED
Status: DISCONTINUED | OUTPATIENT
Start: 2024-01-18 | End: 2024-01-21 | Stop reason: HOSPADM

## 2024-01-18 RX ADMIN — ACETAMINOPHEN 1000 MG: 500 TABLET ORAL at 06:10

## 2024-01-18 RX ADMIN — MAGNESIUM SULFATE HEPTAHYDRATE 2 G/HR: 40 INJECTION, SOLUTION INTRAVENOUS at 01:33

## 2024-01-18 RX ADMIN — NIFEDIPINE 30 MG: 30 TABLET, FILM COATED, EXTENDED RELEASE ORAL at 19:47

## 2024-01-18 RX ADMIN — KETOROLAC TROMETHAMINE 30 MG: 30 INJECTION, SOLUTION INTRAMUSCULAR; INTRAVENOUS at 15:51

## 2024-01-18 RX ADMIN — LORATADINE 10 MG: 10 TABLET ORAL at 10:00

## 2024-01-18 RX ADMIN — ACETAMINOPHEN 1000 MG: 500 TABLET ORAL at 12:48

## 2024-01-18 RX ADMIN — KETOROLAC TROMETHAMINE 30 MG: 30 INJECTION, SOLUTION INTRAMUSCULAR; INTRAVENOUS at 22:21

## 2024-01-18 RX ADMIN — ACETAMINOPHEN 1000 MG: 500 TABLET ORAL at 19:47

## 2024-01-18 RX ADMIN — NIFEDIPINE 30 MG: 30 TABLET, FILM COATED, EXTENDED RELEASE ORAL at 12:48

## 2024-01-18 RX ADMIN — MAGNESIUM SULFATE HEPTAHYDRATE 2 G/HR: 40 INJECTION, SOLUTION INTRAVENOUS at 11:24

## 2024-01-18 RX ADMIN — LABETALOL HYDROCHLORIDE 20 MG: 5 INJECTION, SOLUTION INTRAVENOUS at 19:39

## 2024-01-18 RX ADMIN — DOCUSATE SODIUM 100 MG: 100 CAPSULE, LIQUID FILLED ORAL at 20:18

## 2024-01-18 RX ADMIN — SODIUM CHLORIDE, POTASSIUM CHLORIDE, SODIUM LACTATE AND CALCIUM CHLORIDE 75 ML/HR: 600; 310; 30; 20 INJECTION, SOLUTION INTRAVENOUS at 18:42

## 2024-01-18 RX ADMIN — ENOXAPARIN SODIUM 40 MG: 100 INJECTION SUBCUTANEOUS at 20:18

## 2024-01-18 RX ADMIN — DOCUSATE SODIUM 100 MG: 100 CAPSULE, LIQUID FILLED ORAL at 09:11

## 2024-01-18 RX ADMIN — SODIUM CHLORIDE, POTASSIUM CHLORIDE, SODIUM LACTATE AND CALCIUM CHLORIDE 75 ML/HR: 600; 310; 30; 20 INJECTION, SOLUTION INTRAVENOUS at 04:58

## 2024-01-18 NOTE — PROGRESS NOTES
2024    Name:Desi Espinoza    MR#:[id number]     Progress Note:  Post-Op day 1 S/P    HD:4    Subjective   28 y.o. yo Female  s/p CS at 33w4d doing well.  She has not had any headache, visual changes.  Her swelling is about the same as it was while she was pregnant.  Pain well controlled. Tolerating regular diet and having flatus. Lochia normal.  Tolerating magnesium well, going in a wheelchair ride to see her baby in the NICU currently    Patient Active Problem List   Diagnosis    33 weeks gestation of pregnancy    Rh negative status during pregnancy in first trimester    Group B streptococcal bacteriuria    Environmental allergies    Pregnancy    Severe pre-eclampsia in third trimester    Insulin controlled gestational diabetes mellitus (GDM) in third trimester     delivery delivered        Objective    Vitals  Temp:  Temp:  [97.5 °F (36.4 °C)-98.2 °F (36.8 °C)] 98 °F (36.7 °C)  Temp src: Axillary  BP:  BP: ()/() 143/90  Pulse:  Heart Rate:  [] 92  RR:   Resp:  [14-18] 16  Weight: 118 kg (260 lb 9.6 oz)  BMI:  Body mass index is 44.73 kg/m².      General Awake, alert, no distress  Abdomen Soft, non-distended, fundus firm, 2 cm below umbilicus, appropriately tender  Incision  Intact, no erythema or exudate  Extremities Calves NT bilaterally, 1+ pitting edema BL LE  Neuro: normal patellar reflex         I/O last 3 completed shifts:  In: 3337.9 [I.V.:3337.9]  Out: 1935 [Urine:1690; Emesis/NG output:100; Blood:145]    LABS:   Lab Results   Component Value Date    WBC 19.20 (H) 2024    HGB 12.4 2024    HCT 36.4 2024    MCV 87.3 2024     2024       Infant: female       Assessment   1.  POD 1  2.  Preeclampsia with severe features-her labs are stable and blood pressure moderately well-controlled.  Plan to add nifedipine today as she is coming off the magnesium this afternoon.  Continue to trend her labs daily for now.   Asymptomatic at this time.    Plan: Doing well.  Add nifedipine and discontinue her magnesium today  Lovenox for DVT prophylaxis      Pregnancy    33 weeks gestation of pregnancy    Rh negative status during pregnancy in first trimester    Group B streptococcal bacteriuria    Severe pre-eclampsia in third trimester    Insulin controlled gestational diabetes mellitus (GDM) in third trimester     delivery delivered      Paloma Rodriguez MD  2024 12:19 EST

## 2024-01-18 NOTE — NURSING NOTE
Patient assisted with pumping again.  Patient pumped for 20 min and 6 ml colostrum was collected and sent to NICU.

## 2024-01-18 NOTE — NURSING NOTE
Patient assisted with pumping again now.  Patient pumped for about 20-25 min and 10 ml colostrum collected and sent to NICU.

## 2024-01-18 NOTE — OP NOTE
. SECTION FULL OPERATIVE REPORT  Pre-operative Diagnosis: 33 weeks gestation with severe preeclampsia with recurrent severe range blood pressures  Post-operative Diagnosis: same  Procedure: Primary Low Transverse  Section  Anesthesia: spinal  Surgeon(s): Tonya Valencia MD  Assistant(s): Wagner Denton MD  Infant: LV female infant, Apgars 5/8, weight 4lbs, 10.4oz  Findings: Uterus with 3 cm anterior serosal fibroid, normal fallopian tubes and ovaries  Complications: none  Specimens: placenta  EBL: 500 mL  QBL : 95 mL      Description of Procedure:  The patient was taken to the operating room where anesthesia was found to be adequate. She was prepped and draped in the usual sterile fashion in the dorsal supine position with a leftward tilt. A surgical time-out was performed.   A Pfannenstiel skin incision was made with the scalpel and carried down to the underlying layer of fascia. The fascia was then incised in the midline and the incision was extended laterally with rothman scissors. The superior aspect of the fascia was then grasped with Kocher clamps and the underlying rectus muscles were then dissected off bluntly and with the Rothman scissors. The inferior aspect of the fascia was then grasped with Kocher clamps and dissected off similarly with Rothman scissors. The rectus muscles were  and the peritoneum entered. The peritoneal incision was then carried superiorly and inferiorly taking care to identify the bladder at the lower aspect.   The bladder blade was inserted. The vesicouterine peritoneum was then identified and entered sharply with Metzenbaum scissors and a bladder flap was created.The bladder blade was reinserted and the uterine incision was made in a low transverse fashion using the scalpel. This was extended bilaterally digitally.  The bladder blade was removed and the vertex was grasped and delivered atraumatically. The remainder of the infant was delivered without issue. The nose and  mouth were suctioned and the cord was clamped and cut after 30 seconds. The infant was taken to the warmer for the waiting staff. Cord blood was collected. The placenta was removed with gentle manual traction. The uterus was exteriorized and cleared of all clots and debris. The uterine incision was repaired in two layers using 0 vicryl suture. The uterus was examined and noted to be hemostatic and returned to the abdomen.  The posterior cul-de-sac and gutters were cleared of all clots and debris. The uterus was then re-inspected to ensure hemostasis as were all subfascial tissues. There was an area of bleeding at the left aspect of the rectus muscles that was made hemostatic with a stitch of 3-0 vicryl.  The fascia was re-approximated in a running fashion using 0-vicryl suture. The obstetric local anesthesia solution was injected into the subcutaneous tissue using 15 cc. The subcutaneous tissue was re-approximated in a running fashion with 3-0 vicryl. The skin was closed with 4-0 monocryl.     The patient tolerated the procedure well. All sponge, lap and needle counts were correct. She went to the recovery room in stable fashion.    The patient received Kefzol for antibiotic prophylaxis.     The surgical assistant was responsible for the following tasks:   Retraction, suturing, suctioning and assistance with delivery of the infant.  Their skilled assistance was necessary for the success of this case.          Tonya Valencia MD  January 17, 2024

## 2024-01-18 NOTE — NURSING NOTE
NICU called and patient was given an update on baby.  Baby doing well and patient happy about the update.

## 2024-01-18 NOTE — PLAN OF CARE
Problem: Adult Inpatient Plan of Care  Goal: Plan of Care Review  Flowsheets (Taken 1/18/2024 1517)  Progress: improving  Plan of Care Reviewed With:   patient   spouse  Outcome Evaluation: pt BP have remained < 160/110, no h/a or visual changes, started on procardia PO, pt pumping and went to NICU to visit baby in w/c with RN,     Problem: Adult Inpatient Plan of Care  Goal: Patient-Specific Goal (Individualized)  Flowsheets (Taken 1/18/2024 1517)  Patient-Specific Goals (Include Timeframe): to mother baby, bp remain under control  Individualized Care Needs: assist with pumping,  Anxieties, Fears or Concerns: being away from baby   Goal Outcome Evaluation:  Plan of Care Reviewed With: patient, spouse        Progress: improving  Outcome Evaluation: pt BP have remained < 160/110, no h/a or visual changes, started on procardia PO, pt pumping and went to NICU to visit baby in w/c with RN,

## 2024-01-18 NOTE — ANESTHESIA POSTPROCEDURE EVALUATION
Patient: Desi Espinoza    Procedure Summary       Date: 24 Room / Location:  [facility]    Anesthesia Start:  Anesthesia Stop:     Procedure:  SECTION PRIMARY (Abdomen) Diagnosis:       Insulin controlled gestational diabetes mellitus (GDM) in third trimester      Pre-eclampsia in third trimester      (Insulin controlled gestational diabetes mellitus (GDM) in third trimester [O24.414])      (Pre-eclampsia in third trimester [O14.93])    Surgeons: Tonya Valencia MD Provider: Jonny Gannon MD    Anesthesia Type: spinal ASA Status: 3            Anesthesia Type: spinal    Vitals  Vitals Value Taken Time   /70 24 0101   Temp 36.7 °C (98.1 °F) 24 0000   Pulse 77 24 0101   Resp 14 24 0000   SpO2 93 % 24 0100   Vitals shown include unfiled device data.        Post Anesthesia Care and Evaluation    Patient location during evaluation: bedside  Patient participation: complete - patient participated  Level of consciousness: awake  Pain management: adequate    Airway patency: patent  Anesthetic complications: No anesthetic complications    Cardiovascular status: acceptable  Respiratory status: acceptable  Hydration status: acceptable

## 2024-01-18 NOTE — NURSING NOTE
Patient assisted with pumping again.  Patient pumped for about 20 min and 3 ml was collected and sent to NICU.  Lactation consult put in to see if patient needs a bigger flange on her pump since nipple and areola are rubbing slightly on sides and causing some redness but no cracking or open areas noted and patient denies pain or discomfort.

## 2024-01-18 NOTE — PLAN OF CARE
Goal Outcome Evaluation:  Plan of Care Reviewed With: patient, spouse, mother        Progress: improving  Outcome Evaluation: Patient delivered by  section at 1854.  Baby girl delivered weighing 4 lb 10 oz and was taken to NICU in stable condition.  Patient remained on L&D on magnesium sulfate at 2gm/hr.  Blood pressures remained below severe range.  Pain controlled with Tylenol and OB cocktail injected to incision site by Dr Valencia.  Patient denies headache or vision changes.  Urine now dipping negative for protein and urine output adequate during the night.  Patient  was at bedside at beginning of shift but then went home and patient mother stayed with her for support.  Patient pumped every 3 hours during the night and ws able to express 10 ml on second pumping and 6 ml on third pumping.  Labs to be drawn this AM.  Rhogam given last night as well.

## 2024-01-18 NOTE — NURSING NOTE
Patient assisted with pumping for the first time now.  Patient pumped for about 20 min but no colostrum obtained at this time.  Patient educated that this is normal and we would assist her with pumping every 2-3 hours to stimulate milk production.

## 2024-01-19 LAB
GLUCOSE BLDC GLUCOMTR-MCNC: 104 MG/DL (ref 70–130)
GLUCOSE BLDC GLUCOMTR-MCNC: 107 MG/DL (ref 70–130)
GLUCOSE BLDC GLUCOMTR-MCNC: 142 MG/DL (ref 70–130)
GLUCOSE BLDC GLUCOMTR-MCNC: 81 MG/DL (ref 70–130)

## 2024-01-19 PROCEDURE — 25010000002 ENOXAPARIN PER 10 MG: Performed by: OBSTETRICS & GYNECOLOGY

## 2024-01-19 PROCEDURE — 25010000002 KETOROLAC TROMETHAMINE PER 15 MG: Performed by: OBSTETRICS & GYNECOLOGY

## 2024-01-19 PROCEDURE — 82948 REAGENT STRIP/BLOOD GLUCOSE: CPT

## 2024-01-19 PROCEDURE — 0503F POSTPARTUM CARE VISIT: CPT | Performed by: OBSTETRICS & GYNECOLOGY

## 2024-01-19 RX ADMIN — LORATADINE 10 MG: 10 TABLET ORAL at 08:16

## 2024-01-19 RX ADMIN — ACETAMINOPHEN 650 MG: 325 TABLET, FILM COATED ORAL at 14:39

## 2024-01-19 RX ADMIN — DOCUSATE SODIUM 100 MG: 100 CAPSULE, LIQUID FILLED ORAL at 08:16

## 2024-01-19 RX ADMIN — KETOROLAC TROMETHAMINE 30 MG: 30 INJECTION, SOLUTION INTRAMUSCULAR; INTRAVENOUS at 04:24

## 2024-01-19 RX ADMIN — ENOXAPARIN SODIUM 40 MG: 100 INJECTION SUBCUTANEOUS at 08:16

## 2024-01-19 RX ADMIN — DOCUSATE SODIUM 100 MG: 100 CAPSULE, LIQUID FILLED ORAL at 20:23

## 2024-01-19 RX ADMIN — ACETAMINOPHEN 650 MG: 325 TABLET, FILM COATED ORAL at 08:16

## 2024-01-19 RX ADMIN — OXYCODONE HYDROCHLORIDE 5 MG: 5 TABLET ORAL at 13:34

## 2024-01-19 RX ADMIN — ACETAMINOPHEN 650 MG: 325 TABLET, FILM COATED ORAL at 20:24

## 2024-01-19 RX ADMIN — NIFEDIPINE 60 MG: 60 TABLET, FILM COATED, EXTENDED RELEASE ORAL at 08:16

## 2024-01-19 RX ADMIN — ENOXAPARIN SODIUM 40 MG: 100 INJECTION SUBCUTANEOUS at 20:23

## 2024-01-19 RX ADMIN — KETOROLAC TROMETHAMINE 30 MG: 30 INJECTION, SOLUTION INTRAMUSCULAR; INTRAVENOUS at 10:22

## 2024-01-19 RX ADMIN — OXYCODONE HYDROCHLORIDE 5 MG: 5 TABLET ORAL at 20:24

## 2024-01-19 RX ADMIN — Medication 1 TABLET: at 08:16

## 2024-01-19 RX ADMIN — ACETAMINOPHEN 650 MG: 325 TABLET, FILM COATED ORAL at 02:06

## 2024-01-19 NOTE — PROGRESS NOTES
" POSTPARTUM ROUNDS    Chief complaint: post C/S concerns  SUBJECTIVE:  Patient appears comfortable, and pain seems to be controlled with by mouth medicines.  She is ambulating. .  Discussed advancing activity and diet.       ROS:  Pulm: neg for soa  GI: neg for heavy bleeding  Musculoskel: neg for leg pain      OBJECTIVE:  Vital Signs: /79 (BP Location: Left arm, Patient Position: Sitting)   Pulse 85   Temp 98.5 °F (36.9 °C) (Oral)   Resp 16   Ht 162.6 cm (64\")   Wt 118 kg (260 lb 9.6 oz)   LMP 2023 (Exact Date)   SpO2 99%   Breastfeeding Yes   BMI 44.73 kg/m²     Intake/Output Summary (Last 24 hours) at 2024 1135  Last data filed at 2024 1112  Gross per 24 hour   Intake 3285.17 ml   Output 5685 ml   Net -2399.83 ml       Constitutional:  The patient is well nourished.  Cardiovascular:  RRR  Resp:  nonlabored breathing  The incision is normal  Gastrointestinal:  fundus firm below umbilicus  Extremities:  no edema,no evidence dvt    LABS / IMAGING:  Lab Results (last 24 hours)       Procedure Component Value Units Date/Time    POC Glucose Once [[id number]]  (Normal) Collected: 24 1115    Specimen: Blood Updated: 24 1118     Glucose 107 mg/dL     POC Glucose Once [[id number]]  (Abnormal) Collected: 24 1034    Specimen: Blood Updated: 24 1035     Glucose 142 mg/dL     POC Glucose Once [[id number]]  (Normal) Collected: 24 0730    Specimen: Blood Updated: 24 0732     Glucose 81 mg/dL     POC Glucose Once [[id number]]  (Normal) Collected: 24 2119    Specimen: Blood Updated: 24 2120     Glucose 117 mg/dL     POC Glucose Once [[id number]]  (Abnormal) Collected: 24 1549    Specimen: Blood Updated: 24 1600     Glucose 201 mg/dL               OB History    Para Term  AB Living   1 1 0 1 0 1   SAB IAB Ectopic Molar Multiple Live Births   0 0 0 0 0 1      # Outcome Date GA Lbr Ochoa/2nd Weight Sex Delivery Anes PTL Lv   1 "  24 33w4d  2110 g (4 lb 10.4 oz) F CS-LTranv Spinal Y OSCAR      Birth Comments: panda OR 1          ASSESSMENT AND PLAN:    Principal Problem:    Pregnancy  Active Problems:    33 weeks gestation of pregnancy    Rh negative status during pregnancy in first trimester    Group B streptococcal bacteriuria    Severe pre-eclampsia in third trimester    Insulin controlled gestational diabetes mellitus (GDM) in third trimester     delivery delivered         Patient is postop day 2 from LTCS  Patient is currently stable  We will continue 60 XL every 24 hours as well as Lovenox  Glicose improved, sliding scale in place   Infant is in the NICU but improving  Advance diet as tolerated      Regular diet   Encourage ambulation     Bismark Hamm MD    2024  11:35 EST   2

## 2024-01-19 NOTE — PLAN OF CARE
Problem: Adult Inpatient Plan of Care  Goal: Plan of Care Review  Outcome: Ongoing, Progressing  Goal: Patient-Specific Goal (Individualized)  Outcome: Ongoing, Progressing  Goal: Absence of Hospital-Acquired Illness or Injury  Outcome: Ongoing, Progressing  Goal: Optimal Comfort and Wellbeing  Outcome: Ongoing, Progressing  Goal: Readiness for Transition of Care  Outcome: Ongoing, Progressing     Problem: Diabetes in Pregnancy  Goal: Blood Glucose Level Within Targeted Range  Outcome: Ongoing, Progressing     Problem: Hypertensive Disorders in Pregnancy  Goal: Maternal-Fetal Stabilization  Outcome: Ongoing, Progressing     Problem: Maternal-Fetal Wellbeing  Goal: Optimal Maternal-Fetal Wellbeing  Outcome: Ongoing, Progressing     Problem: Fall Injury Risk  Goal: Absence of Fall and Fall-Related Injury  Outcome: Ongoing, Progressing     Problem:  Fall Injury Risk  Goal: Absence of Fall, Infant Drop and Related Injury  Outcome: Ongoing, Progressing     Problem: Adjustment to Role Transition (Postpartum  Delivery)  Goal: Successful Maternal Role Transition  Outcome: Ongoing, Progressing     Problem: Bleeding (Postpartum  Delivery)  Goal: Hemostasis  Outcome: Ongoing, Progressing     Problem: Infection (Postpartum  Delivery)  Goal: Absence of Infection Signs and Symptoms  Outcome: Ongoing, Progressing     Problem: Pain (Postpartum  Delivery)  Goal: Acceptable Pain Control  Outcome: Ongoing, Progressing     Problem: Postoperative Nausea and Vomiting (Postpartum  Delivery)  Goal: Nausea and Vomiting Relief  Outcome: Ongoing, Progressing     Problem: Postoperative Urinary Retention (Postpartum  Delivery)  Goal: Effective Urinary Elimination  Outcome: Ongoing, Progressing     Problem: Skin Injury Risk Increased  Goal: Skin Health and Integrity  Outcome: Ongoing, Progressing     Problem: Breastfeeding  Goal: Effective Breastfeeding  Outcome: Ongoing, Progressing    Goal Outcome Evaluation:

## 2024-01-19 NOTE — PROGRESS NOTES
"Discharge Planning Assessment  [facility]     Patient Name: Desi Espinoza  MRN: [id number]  Today's Date: 1/19/2024    Admit Date: 1/11/2024    Plan: Infant may discharge to mother when medically ready. DEVI Gonsalez.   Discharge Needs Assessment    No documentation.                  Discharge Plan       Row Name 01/19/24 1108       Plan    Plan Infant may discharge to mother when medically ready. DEVI Gonsalez.    Plan Comments Mother: Desi Espinoza, MRN: [id number]; infant: Carloz \"Leela\" Alexis, MRN: [id number]. CSW consulted for \"NICU admission.\" Of note, no toxicology screens were ordered for mother or infant as need was not warranted at this time. CSW met with mother at bedside while father of infant/ was in the shower. Mother verified address, phone number, and insurance. Mother reports she has contacted HR at her work and added infant to health insurance. Mother reports having a car seat, crib/bassinet, clothes, and diapers for infant. This is mother and father's first baby. Mother reports, maternal grandparents, paternal grandparents, father of infant/, and other family members are available for support as needed. Mother reports infant is following up with [facility] after discharge; mother is comfortable scheduling appointments for infant and has reliable transportation. Mother is not current with [facility] but is familiar with the program. Mother denies any violence, threats, or feeling unsafe at home or relationship. Mother reports she is coping well with infant's NICU admission. Mother shared \"After delivery, my blood pressure was not good, so I haven't been able to visit Leela a ton yet. My blood pressure is more controlled now, so I feel like I can focus all my attention on Leela and not worry about my blood pressure as much.\" CSW provided active listening and validation to mother. CSW explained her role as NICU  and encouraged mother to reach out if needed. CSW " provided mother with a packet of resources including: WIC, HANDS, transportation, infant supplies, counseling, online support groups, postpartum mood and anxiety resources, NICU parent resources, and general community resources. CSW spent time building rapport with mother, and offered validation, support, and encouragement to mother throughout assessment. Mother was polite and appropriate, and denied having unmet needs or concerns at this time. CSW will remain available for psychosocial needs while infant is in the NICU. DEVI Gonsalez.                  Continued Care and Services - Admitted Since 1/11/2024    Coordination has not been started for this encounter.          Demographic Summary       Row Name 01/19/24 1107       General Information    Admission Type inpatient    Arrived From home    Referral Source nursing    Reason for Consult other (see comments)    General Information Comments NICU admission.                   Functional Status       Row Name 01/19/24 1107       Functional Status, IADL    Medications independent    Meal Preparation independent    Housekeeping independent    Laundry independent    Shopping independent       Mental Status    General Appearance WDL WDL       Mental Status Summary    Recent Changes in Mental Status/Cognitive Functioning no changes       Employment/    Employment Status employed full-time    Employment/ Comments [employer]                   Psychosocial       Row Name 01/19/24 1108       Behavior WDL    Behavior WDL WDL       Emotion Mood WDL    Emotion/Mood/Affect WDL WDL       Speech WDL    Speech WDL WDL       Perceptual State WDL    Perceptual State WDL WDL       Thought Process WDL    Thought Process WDL WDL       Intellectual Performance WDL    Intellectual Performance WDL WDL       Coping/Stress    Major Change/Loss/Stressor birth    Patient Personal Strengths future/goal oriented;motivated;positive attitude;strong support system     Sources of Support parent(s);other family members;spouse                   Abuse/Neglect       Row Name 01/19/24 3677       Personal Safety    Feels Unsafe at Home or Work/School no    Feels Threatened by Someone no    Does Anyone Try to Keep You From Having Contact with Others or Doing Things Outside Your Home? no    Physical Signs of Abuse Present no                   Legal    No documentation.                  Substance Abuse    No documentation.                  Patient Forms    No documentation.                     GIN Ruiz

## 2024-01-19 NOTE — LACTATION NOTE
This note was copied from a baby's chart.  P1 [id number] NICU admission - Mom was on Mag in L/D but now is on MBU.  Mom seen in NICU at baby's bedside.  She plans to exclusively pump, she is not interested in direct Bf'g.  She has a Zomee personal breast pump.  Encouraged her to use HGP while she & her baby are pt's to help her establish a better supply.  She can use her Zomee to pump when at home but that it may not be as effective with establishing supply as HGP will be.    She will call LC's when she is back in her room to review pump part cleaning & sterilization.

## 2024-01-19 NOTE — PLAN OF CARE
Problem: Adult Inpatient Plan of Care  Goal: Plan of Care Review  1/19/2024 0037 by Jo Castelan RN  Outcome: Ongoing, Progressing  1/18/2024 2045 by Jo Castelan RN  Outcome: Ongoing, Progressing  Goal: Patient-Specific Goal (Individualized)  1/19/2024 0037 by Jo Castelan RN  Outcome: Ongoing, Progressing  1/18/2024 2045 by Jo Castelan RN  Outcome: Ongoing, Progressing  Goal: Absence of Hospital-Acquired Illness or Injury  1/19/2024 0037 by Jo Castelan RN  Outcome: Ongoing, Progressing  1/18/2024 2045 by Jo Castelan RN  Outcome: Ongoing, Progressing  Intervention: Identify and Manage Fall Risk  Recent Flowsheet Documentation  Taken 1/18/2024 1945 by Jo Castelan RN  Safety Promotion/Fall Prevention: safety round/check completed  Intervention: Prevent and Manage VTE (Venous Thromboembolism) Risk  Recent Flowsheet Documentation  Taken 1/18/2024 1945 by Jo Castelan RN  Activity Management: bedrest  Range of Motion:   active ROM (range of motion) encouraged   ROM (range of motion) performed  Goal: Optimal Comfort and Wellbeing  1/19/2024 0037 by Jo Castelan RN  Outcome: Ongoing, Progressing  1/18/2024 2045 by Jo Castelan RN  Outcome: Ongoing, Progressing  Intervention: Provide Person-Centered Care  Recent Flowsheet Documentation  Taken 1/18/2024 1945 by Jo Castelan RN  Trust Relationship/Rapport:   care explained   choices provided  Goal: Readiness for Transition of Care  1/19/2024 0037 by Jo Castelan RN  Outcome: Ongoing, Progressing  1/18/2024 2045 by Jo Castelan RN  Outcome: Ongoing, Progressing     Problem: Diabetes in Pregnancy  Goal: Blood Glucose Level Within Targeted Range  1/19/2024 0037 by Jo Castelan RN  Outcome: Ongoing, Progressing  1/18/2024 2045 by Jo Castelan RN  Outcome: Ongoing, Progressing     Problem: Hypertensive Disorders in Pregnancy  Goal: Maternal-Fetal Stabilization  1/19/2024 0037 by Jo Castelan RN  Outcome: Ongoing, Progressing  1/18/2024 2045  by Jo Castelan, RN  Outcome: Ongoing, Progressing   Goal Outcome Evaluation:

## 2024-01-19 NOTE — PLAN OF CARE
Goal Outcome Evaluation:              Outcome Evaluation: VS stable, although BP are reaching near the threshold of the systolic pressure. Pt pumping and plans to keep pumping. Fundus and lochia WNL, Pain controlled with tylenol and toradol.

## 2024-01-19 NOTE — PLAN OF CARE
Goal Outcome Evaluation:              Outcome Evaluation: BPs have been under threshold, BS have been good, Pain controlled well. Pt is up ad jorge.

## 2024-01-19 NOTE — PLAN OF CARE
Goal Outcome Evaluation:      Patient transported in stable condition to postpartum unit. MD notified of elevated BP. Notify MD of BP >160/110 overnight. [facility] checks WNL. VSS prior to transfer.

## 2024-01-20 LAB — GLUCOSE BLDC GLUCOMTR-MCNC: 101 MG/DL (ref 70–130)

## 2024-01-20 PROCEDURE — 25010000002 ENOXAPARIN PER 10 MG: Performed by: OBSTETRICS & GYNECOLOGY

## 2024-01-20 PROCEDURE — 82948 REAGENT STRIP/BLOOD GLUCOSE: CPT

## 2024-01-20 PROCEDURE — 0503F POSTPARTUM CARE VISIT: CPT | Performed by: OBSTETRICS & GYNECOLOGY

## 2024-01-20 RX ORDER — LABETALOL 100 MG/1
100 TABLET, FILM COATED ORAL EVERY 8 HOURS SCHEDULED
Status: DISCONTINUED | OUTPATIENT
Start: 2024-01-20 | End: 2024-01-21

## 2024-01-20 RX ADMIN — Medication 1 TABLET: at 07:50

## 2024-01-20 RX ADMIN — DOCUSATE SODIUM 100 MG: 100 CAPSULE, LIQUID FILLED ORAL at 07:51

## 2024-01-20 RX ADMIN — LABETALOL HYDROCHLORIDE 100 MG: 100 TABLET, FILM COATED ORAL at 18:47

## 2024-01-20 RX ADMIN — ENOXAPARIN SODIUM 40 MG: 100 INJECTION SUBCUTANEOUS at 21:51

## 2024-01-20 RX ADMIN — ACETAMINOPHEN 650 MG: 325 TABLET, FILM COATED ORAL at 02:29

## 2024-01-20 RX ADMIN — DOCUSATE SODIUM 100 MG: 100 CAPSULE, LIQUID FILLED ORAL at 21:51

## 2024-01-20 RX ADMIN — ACETAMINOPHEN 650 MG: 325 TABLET, FILM COATED ORAL at 14:24

## 2024-01-20 RX ADMIN — ENOXAPARIN SODIUM 40 MG: 100 INJECTION SUBCUTANEOUS at 07:50

## 2024-01-20 RX ADMIN — ACETAMINOPHEN 650 MG: 325 TABLET, FILM COATED ORAL at 21:51

## 2024-01-20 RX ADMIN — MONTELUKAST SODIUM 10 MG: 10 TABLET, FILM COATED ORAL at 02:29

## 2024-01-20 RX ADMIN — ACETAMINOPHEN 650 MG: 325 TABLET, FILM COATED ORAL at 08:28

## 2024-01-20 RX ADMIN — LORATADINE 10 MG: 10 TABLET ORAL at 07:52

## 2024-01-20 RX ADMIN — NIFEDIPINE 60 MG: 60 TABLET, FILM COATED, EXTENDED RELEASE ORAL at 07:49

## 2024-01-20 RX ADMIN — OXYCODONE HYDROCHLORIDE 5 MG: 5 TABLET ORAL at 22:49

## 2024-01-20 RX ADMIN — LABETALOL HYDROCHLORIDE 100 MG: 100 TABLET, FILM COATED ORAL at 10:37

## 2024-01-20 NOTE — LACTATION NOTE
Mom called for instructions about cleaning the parts of the pump and sterilizing them. Education given -soap, basin and sterilizing bag provided. She denies any questions.

## 2024-01-20 NOTE — PROGRESS NOTES
[facility]   PROGRESS NOTE    Post-Op Day 3 S/P   Subjective     Patient reports:  Pain is well controlled.  She is ambulating. Tolerating diet. Tolerating po -- normal.  Intake -- c/o of tolerating po solids.   Voiding - without difficulty;  Vaginal bleeding is light.    ROS:  Neuro: neg for headache or vision changes  Pulm: neg for soa  CV: neg for chest pain  GI: neg for n/v  : neg for heavy bleeding  Musculoskeletal: neg for leg pain    Objective      Vitals: Vital Signs Range for the last 24 hours  Temperature: Temp:  [97 °F (36.1 °C)-98.7 °F (37.1 °C)] 98.7 °F (37.1 °C)   Temp Source: Temp src: Oral   BP: BP: (127-160)/(79-98) 145/98   Pulse: Heart Rate:  [] 92   Respirations: Resp:  [16] 16   SPO2:     O2 Amount (l/min):     O2 Devices     Weight:              Physical Exam    Lungs clear to auscultation bilaterally   Abdomen Soft, fundus firm at U-1   Incision  no drainage, no erythema, no swelling, well approximated   Extremities Bilateral lower ext with 1+ edema; no cords or tenderness; 2+ DTR's     Labs:  Lab Results   Component Value Date    WBC 19.20 (H) 2024    HGB 12.4 2024    HCT 36.4 2024    MCV 87.3 2024     2024   B Negative     Assessment & Plan        Pregnancy    33 weeks gestation of pregnancy    Rh negative status during pregnancy in first trimester    Group B streptococcal bacteriuria    Severe pre-eclampsia in third trimester    Insulin controlled gestational diabetes mellitus (GDM) in third trimester     delivery delivered      Assessment:    Desi Espinoza is Day 3  post-partum  , Low Transverse  : pt is doing well today and denies any issues.     Preeclampsia: BP's remain elevated on procardia XL 60 mg daily. She is asymptomatic. Will add labetalol 100 mg Q 8 hours.     Rh neg: pt received rhogam postpartum    GDM: recent glucose values have been stable, will stop glucose monitoring     Plan:  continue post  op care.        Tonya Valencia MD  1/20/2024  10:38 EST

## 2024-01-20 NOTE — LACTATION NOTE
Pt reports she is trying to pump every 3 hours using the hgp or her personal pump. She reports getting 2-3 cc's each pumping. Pt denies questions. Encouraged to cont to pump every 3 hours and call LC if needed.    Lactation Consult Note    Evaluation Completed: 2024 11:32 EST  Patient Name: Desi Espinoza  :  1995  MRN:  [id number]     REFERRAL  INFORMATION:                                         DELIVERY HISTORY:        Skin to skin initiation date/time:      Skin to skin end date/time:           MATERNAL ASSESSMENT:                               INFANT ASSESSMENT:  Information for the patient's :  EspinozaCarloz purvis [[id number]]   No past medical history on file.                                                                                                   MATERNAL INFANT FEEDING:                                                                       EQUIPMENT TYPE:                                 BREAST PUMPING:          LACTATION REFERRALS:

## 2024-01-21 VITALS
BODY MASS INDEX: 44.49 KG/M2 | DIASTOLIC BLOOD PRESSURE: 85 MMHG | SYSTOLIC BLOOD PRESSURE: 144 MMHG | HEIGHT: 64 IN | HEART RATE: 64 BPM | OXYGEN SATURATION: 99 % | RESPIRATION RATE: 16 BRPM | WEIGHT: 260.6 LBS | TEMPERATURE: 98 F

## 2024-01-21 PROCEDURE — 0503F POSTPARTUM CARE VISIT: CPT | Performed by: OBSTETRICS & GYNECOLOGY

## 2024-01-21 PROCEDURE — 25010000002 ENOXAPARIN PER 10 MG: Performed by: OBSTETRICS & GYNECOLOGY

## 2024-01-21 RX ORDER — LABETALOL 200 MG/1
200 TABLET, FILM COATED ORAL 3 TIMES DAILY
Qty: 90 TABLET | Refills: 1 | Status: SHIPPED | OUTPATIENT
Start: 2024-01-21 | End: 2024-01-30

## 2024-01-21 RX ORDER — LABETALOL 200 MG/1
200 TABLET, FILM COATED ORAL EVERY 8 HOURS SCHEDULED
Status: DISCONTINUED | OUTPATIENT
Start: 2024-01-21 | End: 2024-01-21 | Stop reason: HOSPADM

## 2024-01-21 RX ORDER — IBUPROFEN 600 MG/1
600 TABLET ORAL EVERY 6 HOURS PRN
Qty: 30 TABLET | Refills: 0 | Status: SHIPPED | OUTPATIENT
Start: 2024-01-21

## 2024-01-21 RX ORDER — OXYCODONE HYDROCHLORIDE 5 MG/1
5 TABLET ORAL EVERY 4 HOURS PRN
Qty: 15 TABLET | Refills: 0 | Status: SHIPPED | OUTPATIENT
Start: 2024-01-21 | End: 2024-01-24

## 2024-01-21 RX ADMIN — DOCUSATE SODIUM 100 MG: 100 CAPSULE, LIQUID FILLED ORAL at 07:51

## 2024-01-21 RX ADMIN — LABETALOL HYDROCHLORIDE 100 MG: 100 TABLET, FILM COATED ORAL at 02:50

## 2024-01-21 RX ADMIN — ACETAMINOPHEN 650 MG: 325 TABLET, FILM COATED ORAL at 09:27

## 2024-01-21 RX ADMIN — LABETALOL HYDROCHLORIDE 200 MG: 200 TABLET, FILM COATED ORAL at 10:30

## 2024-01-21 RX ADMIN — ACETAMINOPHEN 650 MG: 325 TABLET, FILM COATED ORAL at 04:18

## 2024-01-21 RX ADMIN — NIFEDIPINE 60 MG: 60 TABLET, FILM COATED, EXTENDED RELEASE ORAL at 07:50

## 2024-01-21 RX ADMIN — ENOXAPARIN SODIUM 40 MG: 100 INJECTION SUBCUTANEOUS at 07:51

## 2024-01-21 RX ADMIN — MONTELUKAST SODIUM 10 MG: 10 TABLET, FILM COATED ORAL at 02:50

## 2024-01-21 RX ADMIN — LORATADINE 10 MG: 10 TABLET ORAL at 09:27

## 2024-01-21 NOTE — PLAN OF CARE
Goal Outcome Evaluation:  Plan of Care Reviewed With: patient        Progress: improving  Outcome Evaluation: VSS.  BPs under parameters.  Fundal assessment and lochia, wnl.

## 2024-01-21 NOTE — PLAN OF CARE
Goal Outcome Evaluation:              Outcome Evaluation: vss, labetolol dose increased, doing well, pain well controlled

## 2024-01-21 NOTE — PROGRESS NOTES
" POSTPARTUM ROUNDS    CC: POD 4 from CS    SUBJECTIVE:  Pain is controlled. The patient is tolerating a regular diet. She is ambulating. Her lochia is normal.   No headache,   Wants to be discharged,  Baby is well in NICU    ROS: mild cramping and incision pain  No N/V, no F/C, no leg pain, noHA    OBJECTIVE:  Vital Signs: /85 (BP Location: Left arm, Patient Position: Lying) Comment: nurse aware  Pulse 64   Temp 98 °F (36.7 °C) (Oral)   Resp 16   Ht 162.6 cm (64\")   Wt 118 kg (260 lb 9.6 oz)   LMP 2023 (Exact Date)   SpO2 99%   Breastfeeding Yes   BMI 44.73 kg/m²     Intake/Output Summary (Last 24 hours) at 2024 1131  Last data filed at 2024 0931  Gross per 24 hour   Intake 720 ml   Output 1500 ml   Net -780 ml       Constitutional: The patient is well nourished.  Cardiovascular:RRR  Resp: nonlabored breathing  The incision is clean, dry and Intact  Gastrointestinal: fundus firm below umbilicus  Extremities:no edema, non-tender bilaterally    LABS / IMAGING:  Lab Results (last 24 hours)       ** No results found for the last 24 hours. **            ASSESSMENT AND PLAN:    Patient Active Problem List   Diagnosis    33 weeks gestation of pregnancy    Rh negative status during pregnancy in first trimester    Group B streptococcal bacteriuria    Environmental allergies    Pregnancy    Severe pre-eclampsia in third trimester    Insulin controlled gestational diabetes mellitus (GDM) in third trimester     delivery delivered       BP is controlled on procardia and labetalol  Discharge today and follow up this week for BP check                   Mony Stock MD    2024  11:31 EST       "

## 2024-01-21 NOTE — DISCHARGE SUMMARY
Date of Discharge:  2024    Discharge Diagnosis: severe preeclampsia ,  delivery    Presenting Problem/History of Present Illness  Active Hospital Problems    Diagnosis  POA    **Pregnancy [Z34.90]  Not Applicable     delivery delivered [O82]  Yes    Insulin controlled gestational diabetes mellitus (GDM) in third trimester [O24.414]  Yes    Severe pre-eclampsia in third trimester [O14.13]  Yes    Group B streptococcal bacteriuria [R82.71]  Yes    Rh negative status during pregnancy in first trimester [O26.891, Z67.91]  Yes    33 weeks gestation of pregnancy [Z3A.33]  Not Applicable      Resolved Hospital Problems   No resolved problems to display.           Hospital Course  Patient is a 28 y.o. female presented with preeclampsia.  She was managed inpatient until progressing to severe range BPs.  She also had gestational diabetes requiring insulin.  She began to have recurrent severe range BP elevations and was delivered by primary CS at 33 4/7 weeks.  She did well postoperatively.  She was on magnesium right before delivery and 24 hours after.  She required oral antihypertensive meds to control her hypertension postpartum.  On POD 4 she was desiring discharge and doing well.  Her BPs are normal to borderline on procardia and labetalol.  She will have a post op appt in a few days for BP check.      Procedures Performed    Procedure(s):   SECTION PRIMARY  -------------------       Consults:   Consults       Date and Time Order Name Status Description    2024  7:16 PM Inpatient Maternal & Fetal Medicine Consult Completed             Pertinent Test Results: none    Condition on Discharge:  good     Vital Signs  Temp:  [97.1 °F (36.2 °C)-98.5 °F (36.9 °C)] 98 °F (36.7 °C)  Heart Rate:  [64-99] 64  Resp:  [16-18] 16  BP: (131-159)/() 144/85    Physical Exam:   See progress note from today    Discharge Disposition  Home or Self Care    Discharge Medications     Discharge  Medications        New Medications        Instructions Start Date   ibuprofen 600 MG tablet  Commonly known as: ADVIL,MOTRIN   600 mg, Oral, Every 6 Hours PRN      labetalol 200 MG tablet  Commonly known as: NORMODYNE   200 mg, Oral, 3 Times Daily      NIFEdipine CC 60 MG 24 hr tablet  Commonly known as: ADALAT CC   60 mg, Oral, Every 24 Hours Scheduled      oxyCODONE 5 MG immediate release tablet  Commonly known as: ROXICODONE   5 mg, Oral, Every 4 Hours PRN             Continue These Medications        Instructions Start Date   Accu-Chek Guide Me w/Device kit       B-D UF III MINI PEN NEEDLES 31G X 5 MM misc  Generic drug: Insulin Pen Needle   Use four times daily to inject insulin.      glucose monitor monitoring kit   Use four times daily to check blood glucose.      Lancets misc   Use to check glucose seven times daily and as needed.      montelukast 10 MG tablet  Commonly known as: SINGULAIR   10 mg, Oral, Daily      prenatal (CLASSIC) vitamin 28-0.8 MG tablet tablet  Generic drug: prenatal vitamin   Oral, Daily             Stop These Medications      Blood Glucose Test strip     CLARITIN-D 24 HOUR PO     Glucagon 0.5 MG/0.1ML solution auto-injector     insulin aspart 100 UNIT/ML solution pen-injector sc pen  Commonly known as: novoLOG FLEXPEN     insulin  UNIT/ML injection  Commonly known as: humuLIN N,novoLIN N     NovoLIN N FlexPen 100 UNIT/ML injection  Generic drug: Insulin NPH (Human) (Isophane)     nystatin [id number] UNIT/GM powder  Commonly known as: MYCOSTATIN     omeprazole 40 MG capsule  Commonly known as: priLOSEC     ondansetron 4 MG tablet  Commonly known as: Zofran              Discharge Diet:     Activity at Discharge:     Follow-up Appointments  Future Appointments   Date Time Provider Department Center   1/30/2024  2:30 PM Paloma Rodriguez MD [facility]         Test Results Pending at Discharge       Mony Stock MD  01/21/24  11:38 EST    Time: 30 minutes

## 2024-01-23 ENCOUNTER — TELEPHONE (OUTPATIENT)
Dept: OBSTETRICS AND GYNECOLOGY | Age: 29
End: 2024-01-23
Payer: COMMERCIAL

## 2024-01-23 NOTE — TELEPHONE ENCOUNTER
Patient delivered via  on 2024 due to elevated blood pressure.  She is scheduled for 2024 for an incision check.  Does she need to come in sooner to have her B/P checked?  Also she states she was placed on a blood pressure pill and instructed to take it at breakfast (7-8AM), lunch (around noon) and bedtime (10PM).  She is inquiring if this is spaced out equal enough?  Please advise.

## 2024-01-24 NOTE — PAYOR COMM NOTE
"Dhiraj Perez (28 y.o. Female)       Date of Birth   1995    Social Security Number       Address   [address] [place] [address]    Home Phone   [phone]    MRN   [id number]       Taoist   Patient Refused    Marital Status                               Admission Date   1/11/24    Admission Type   Emergency    Admitting Provider   Paloma Rodriguez MD    Attending Provider       Department, Room/Bed   [address], E347/1       Discharge Date   1/21/2024    Discharge Disposition   Home or Self Care    Discharge Destination   Home                              Attending Provider: (none)   Allergies: No Known Allergies    Isolation: None   Infection: None   Code Status: Prior    Ht: 162.6 cm (64\")   Wt: 118 kg (260 lb 9.6 oz)    Admission Cmt: None   Principal Problem: Pregnancy [Z34.90]                   Active Insurance as of 1/11/2024       Primary Coverage       Payor Plan Insurance Group Employer/Plan Group    [employer] EMPLOYEE [id number]       Payor Plan Address Payor Plan Phone Number Payor Plan Fax Number Effective Dates    [address] [phone]  1/1/2022 - None Entered    [place] [address]         Subscriber Name Subscriber Birth Date Member ID       PEREZDHIRAJ HAMPTON АЛЕКСАНДР 1995 [id number]                     Emergency Contacts        (Rel.) Home Phone Work Phone Mobile Phone    Dong Perez (Spouse) -- -- [phone]    GalenCriss fuentes (Mother) -- -- [phone]              Insurance Information                  [employer] [employer] Phone: [phone]    Subscriber: Dhiraj Perez Subscriber#: [id number]    Group#: [id number] Precert#: --             History & Physical        Paloma Rodriguez MD at 01/11/24 [address] [place] [address]  Obstetric History and Physical    Chief Complaint   Patient presents with    Elevated Blood Pressure     Pt sent from office for bp " check and labs       Subjective    Patient is a 28 y.o. female  currently at 32w5d, who presents from the office due to elevated BP.  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  Her blood pressure was in the 170s systolic.  She denies any headache, visual changes.  No cramping, bleeding.  She notes normal fetal movement    Her prenatal care is complicated by gestational diabetes only moderately well-controlled with insulin, have been adjusting her regimen frequently due to episodes of hypoglycemia on weight-based dosing.  She also has group B strep bacteriuria.  She is Rh- and has received RhoGAM.  Baby has been breech on most recent ultrasound. Nst only at last office visit.    The following portions of the patients history were reviewed and updated as appropriate: current medications, allergies, past medical history, past surgical history, problem list, and ob hx  .       Prenatal Information:       Prenatal Labs for [facility] Patients  Lab Results   Component Value Date    HGB 12.4 2024    HEPBSAG Negative 2023    ABSCRN Negative 2023    [id number] Non Reactive 2023    HEPCVIRUSABY Non Reactive 2023       Prenatal Labs -- transcribed from paper records by Nurse  Lab Results   Component Value Date    ABO B 2023    RH Negative 2023    HEPBSAG Negative 2023    RPR Non Reactive 2023    [id number] Non Reactive 2023           Past OB History:       OB History    Para Term  AB Living   1 0 0 0 0 0   SAB IAB Ectopic Molar Multiple Live Births   0 0 0 0 0 0      # Outcome Date GA Lbr Ochoa/2nd Weight Sex Delivery Anes PTL Lv   1 Current                Past Medical History: Past Medical History:   Diagnosis Date    Gestational diabetes     Gestational hypertension     Preeclampsia 2024    Pregnancy 2024    Seasonal allergies       Past Surgical  History Past Surgical History:   Procedure Laterality Date    NO PAST SURGERIES        Family History: Family History   Problem Relation Age of Onset    Diabetes Father     Diabetes Mother     Breast cancer Mother     Polycystic ovary syndrome Sister     Congenital heart disease Neg Hx       Social History:  reports that she has never smoked. She has never used smokeless tobacco.   reports that she does not currently use alcohol.   reports no history of drug use.           Objective      Vital Signs Range for the last 24 hours  Temperature: Temp:  [97.5 °F (36.4 °C)] 97.5 °F (36.4 °C)   Temp Source: Temp src: Oral   BP: BP: (120-149)/(80-92) 148/87   Pulse: Heart Rate:  [65-83] 68   Respirations: Resp:  [18] 18   SPO2: SpO2:  [100 %] 100 %   O2 Amount (l/min):     O2 Devices     Weight: Weight:  [119 kg (263 lb)] 119 kg (263 lb)     Physical Examination: General appearance - alert, well appearing, and in no distress  Chest - no tachypnea, retractions or cyanosis  Abdomen -fundus soft, nontender.  No right upper quadrant abdominal tenderness  Neurological - alert, oriented, normal speech, no focal findings or movement disorder noted, normal patellar DTR  Extremities -trace pedal edema bilateral shins  Skin - normal coloration and turgor, no rashes, no suspicious skin lesions noted    Presentation: Not yet evaluated   Cervix: Exam by:     Dilation:     Effacement:     Station:         Fetal Heart Rate Assessment   Method: Fetal HR Assessment Method: external   Beats/min: Fetal HR (beats/min): 140   Baseline: Fetal HR Baseline: normal range   Varibility: Fetal HR Variability: moderate (amplitude range 6 to 25 bpm)   Accels: Fetal HR Accelerations: greater than/equal to 15 bpm, lasting at least 15 seconds   Decels: Fetal HR Decelerations: absent   Tracing Category:       Uterine Assessment   Method: Method: palpation, external tocotransducer   Frequency (min):     Ctx Count in 10 min:     Duration:     Intensity:  Contraction Intensity: no contractions   Intensity by IUPC:     Resting Tone: Uterine Resting Tone: soft by palpation   Resting Tone by IUPC:     Suhail Units:       Results from last 7 days   Lab Units 01/11/24  1653   WBC 10*3/mm3 12.84*   HEMOGLOBIN g/dL 12.4   HEMATOCRIT % 38.1   PLATELETS 10*3/mm3 231       Assessment & Plan      Pregnancy    Rh negative status during pregnancy in first trimester    Group B streptococcal bacteriuria    Preeclampsia        Assessment:  1.  Intrauterine pregnancy at 32w5d weeks gestation with reactive fetal status.    2.  Preeclampsia.  Insulin controlled gestational diabetes  3.  Obstetrical history benign  4.  GBS status: Positive bacteriuria    Plan:  1.  She has mildly elevated blood pressures and significant protein creatinine ratio 800 mg.  She is asymptomatic and otherwise her labs are within normal limits.  Given this quick onset of hypertension and proteinuria, recommended observing as she may progress to severe preeclampsia.  Plan for 24-hour urine, we will consult MFM tomorrow as she has gestational diabetes only moderately well-controlled with her current insulin regimen and may need steroids for fetal lung maturity.  2.  Insulin controlled gestational diabetes-she had episodes of hypoglycemia on a weight-based regimen and her most recent regimen has been 8 units of NPH in the morning and 20 units NPH at night.  24 units of short acting with breakfast and 8 with dinner.  3.  Rh negative- she has received RhoGAM  For DVT prophylaxis-SCDs ordered        Paloma Rodriguez MD  01/11/2024  19:55 EST        Electronically signed by Paloma Rodriguez MD at 01/11/24 1957       Facility-Administered Medications as of 1/21/2024   Medication Dose Route Frequency Provider Last Rate Last Admin    [COMPLETED] acetaminophen (TYLENOL) tablet 1,000 mg  1,000 mg Oral Once Tonya Valencia MD   1,000 mg at 01/17/24 1803    [COMPLETED] acetaminophen (TYLENOL) tablet 1,000 mg   1,000 mg Oral Q6H Tonya Valencia MD   1,000 mg at 24 1947    [COMPLETED] betamethasone acetate-betamethasone sodium phosphate (CELESTONE SOLUSPAN) injection 12 mg  12 mg Intramuscular Q24H María Mcnair MD   12 mg at 24 1051    [COMPLETED] ceFAZolin in dextrose (ANCEF) IVPB solution 2,000 mg  2,000 mg Intravenous Once Tonya Valencia MD   2,000 mg at 24 180    [COMPLETED] diphenhydrAMINE (BENADRYL) injection 25 mg  25 mg Intravenous Once Tonya Valencia MD   25 mg at 24 215    [COMPLETED] famotidine (PEPCID) injection 20 mg  20 mg Intravenous Once PRN Tonya Valencia MD   20 mg at 24 180    [] HYDROmorphone (DILAUDID) injection 0.5 mg  0.5 mg Intravenous Q10 Min PRN Ronnell Shepherd MD        [COMPLETED] incisional cocktail 6 - Ropivacaine 0.5% (50mL), Clonidine HCl 80mcg/0.8 mL,  Epinephrine 1:1000 (0.3mL), N/S 0.9% (50mL) solution 100 mL  100 mL Injection Once Tonya Valencia MD   15 mL at 24 193    [COMPLETED] ketorolac (TORADOL) injection 30 mg  30 mg Intravenous Q6H Paloma Rodriguez MD   30 mg at 24 1022    [] magnesium sulfate 20 GM/500ML infusion  2 g/hr Intravenous Continuous Tonya Valencia MD   Stopped at 24 1905    [COMPLETED] magnesium sulfate bolus from bag 0.04 g/mL solution 6 g  6 g Intravenous Once Mony Stock  mL/hr at 24 0630 6 g at 24 0630    [] miSOPROStol (CYTOTEC) 200 MCG tablet  - ADS Override Pull             [] morphine injection 2 mg  2 mg Intravenous Q30 Min PRN Ronnell Shepherd MD        [COMPLETED] NIFEdipine XL (PROCARDIA XL) 24 hr tablet 30 mg  30 mg Oral Once Paloma Rodriguze MD   30 mg at 24 194    [COMPLETED] ondansetron (ZOFRAN) injection 4 mg  4 mg Intravenous Once PRN Tonya Valencia MD   4 mg at 24 1428    [COMPLETED] ondansetron (ZOFRAN) injection 4 mg  4 mg Intravenous Once PRN Ronnell Shepherd MD   4 mg at  24    [COMPLETED] oxytocin (PITOCIN) 30 units in 0.9% sodium chloride 500 mL (premix)  999 mL/hr Intravenous Once Tonya Valencia  mL/hr at 24 250 mL/hr at 24    Followed by    [] oxytocin (PITOCIN) 30 units in 0.9% sodium chloride 500 mL (premix)  250 mL/hr Intravenous Continuous Tonya Valencia MD   Stopped at 24    [COMPLETED] oxytocin (PITOCIN) 30 units in 0.9% sodium chloride 500 mL (premix)  75 mL/hr Intravenous Continuous PRN Tonya Valencia MD 75 mL/hr at 24 75 mL/hr at 24    [COMPLETED] Rho D Immune Globulin (RHOPHYLAC) injection 1,500 Units  1,500 Units Intravenous Once PRN Tonya Valencia MD   1,500 Units at 24    [COMPLETED] Sod Citrate-Citric Acid (BICITRA) oral solution 30 mL  30 mL Oral Once Medina Petersen MD   30 mL at 24 180    [] Tranexamic Acid-NaCl 1000-0.7 MG/100ML-%  - ADS Override Pull              Lab Results (last 72 hours)       Procedure Component Value Units Date/Time    POC Glucose Once [[id number]]  (Normal) Collected: 24 0730    Specimen: Blood Updated: 24 0731     Glucose 101 mg/dL     POC Glucose Once [[id number]]  (Normal) Collected: 24 1537    Specimen: Blood Updated: 24 1539     Glucose 104 mg/dL     POC Glucose Once [[id number]]  (Normal) Collected: 24 1115    Specimen: Blood Updated: 24 1118     Glucose 107 mg/dL     POC Glucose Once [[id number]]  (Abnormal) Collected: 24 1034    Specimen: Blood Updated: 24 1035     Glucose 142 mg/dL     POC Glucose Once [[id number]]  (Normal) Collected: 24 0730    Specimen: Blood Updated: 24 0732     Glucose 81 mg/dL     POC Glucose Once [[id number]]  (Normal) Collected: 24    Specimen: Blood Updated: 24     Glucose 117 mg/dL     POC Glucose Once [[id number]]  (Abnormal) Collected: 24 1549    Specimen: Blood Updated: 24 1600      Glucose 201 mg/dL           Imaging Results (Last 72 Hours)       ** No results found for the last 72 hours. **          ECG/EMG Results (last 72 hours)       ** No results found for the last 72 hours. **          Orders (last 72 hrs)        Start     Ordered    01/21/24 1136  Discharge patient  Once         01/21/24 1138    01/21/24 1030  labetalol (NORMODYNE) tablet 200 mg  Every 8 Hours Scheduled,   Status:  Discontinued         01/21/24 0841    01/21/24 0000  labetalol (NORMODYNE) 200 MG tablet  3 Times Daily         01/21/24 1138    01/21/24 0000  NIFEdipine CC (ADALAT CC) 60 MG 24 hr tablet  Every 24 Hours Scheduled         01/21/24 1138    01/21/24 0000  oxyCODONE (ROXICODONE) 5 MG immediate release tablet  Every 4 Hours PRN         01/21/24 1138    01/21/24 0000  ibuprofen (ADVIL,MOTRIN) 600 MG tablet  Every 6 Hours PRN         01/21/24 1138    01/20/24 1045  labetalol (NORMODYNE) tablet 100 mg  Every 8 Hours Scheduled,   Status:  Discontinued         01/20/24 0945    01/19/24 0900  NIFEdipine XL (PROCARDIA XL) 24 hr tablet 60 mg  Every 24 Hours Scheduled,   Status:  Discontinued         01/18/24 1936    01/19/24 0015  acetaminophen (TYLENOL) tablet 650 mg  Every 6 Hours,   Status:  Discontinued        See [facility] for full Linked Orders Report.    01/17/24 2316    01/18/24 2015  Enoxaparin Sodium (LOVENOX) syringe 40 mg  Every 12 Hours,   Status:  Discontinued         01/18/24 0721    01/18/24 1700  insulin regular (humuLIN R,novoLIN R) injection 5 Units  Once,   Status:  Discontinued         01/18/24 1605    01/18/24 1000  loratadine (CLARITIN) tablet 10 mg  Daily,   Status:  Discontinued         01/18/24 0909    01/18/24 0900  montelukast (SINGULAIR) tablet 10 mg  Daily,   Status:  Discontinued         01/18/24 0721    01/18/24 0900  prenatal vitamin tablet 1 tablet  Daily,   Status:  Discontinued         01/18/24 0721    01/18/24 0900  docusate sodium (COLACE) capsule 100 mg  2 Times Daily,   Status:   Discontinued         01/18/24 0721    01/18/24 0900  lactated ringers infusion  Continuous,   Status:  Discontinued         01/18/24 0806    01/18/24 0800  Ambulate Patient  2 Times Daily,   Status:  Canceled      Comments: After anesthesia wears off.    01/18/24 0721    01/18/24 0721  naloxone (NARCAN) injection 0.2 mg  Every 15 Minutes PRN,   Status:  Discontinued         01/18/24 0721    01/18/24 0721  Up with Assistance  As Needed,   Status:  Canceled       01/18/24 0721    01/18/24 0721  I/O  Every Shift,   Status:  Canceled       01/18/24 0721    01/18/24 0721  Straight Cath Every 4-6 Hours As Needed If Patient is Unable to Void After 4-6 Hours, Bladder Scan Volume is Greater Than 500mL & Patient Has Symptoms of Bladder Discomfort / Distention  As Needed,   Status:  Canceled       01/18/24 0721    01/18/24 0721  Schedule / Prompt Voiding For Patients With Urinary Incontinence  As Needed,   Status:  Canceled       01/18/24 0721    01/18/24 0721  Ambulate Patient 3-5 times per day (with or without Pacheco)  Every Shift,   Status:  Canceled       01/18/24 0721 01/18/24 0721  Chewing Gum  As Needed,   Status:  Canceled       01/18/24 0721 01/18/24 0721  Apply witch hazel pads / TUCKS to perineum as needed for comfort PRN  As Needed,   Status:  Canceled       01/18/24 0721    01/18/24 0721  Warm compress  As Needed,   Status:  Canceled       01/18/24 0721    01/18/24 0721  Apply ace wrap, tight bra, or binder  As Needed,   Status:  Canceled       01/18/24 0721    01/18/24 0721  Apply ice packs  As Needed,   Status:  Canceled       01/18/24 0721    01/18/24 0721  miSOPROStol (CYTOTEC) tablet 600 mcg  Once As Needed,   Status:  Discontinued         01/18/24 0721 01/18/24 0721  carboprost (HEMABATE) injection 250 mcg  Once As Needed,   Status:  Discontinued         01/18/24 0721    01/18/24 0721  tranexamic acid 1000 mg in 100 mL 0.7% NaCl infusion (premix)  Once As Needed,   Status:  Discontinued          01/18/24 0721    01/18/24 0721  simethicone (MYLICON) chewable tablet 80 mg  4 Times Daily PRN,   Status:  Discontinued         01/18/24 0721    01/18/24 0721  Bladder Scan if Patient Unable to Void 4-6 Hours After Catheter Removal  As Needed,   Status:  Canceled         01/18/24 0721    01/18/24 0646  lanolin topical 1 application   Every 1 Hour PRN,   Status:  Discontinued         01/18/24 0646    01/18/24 0537  oxyCODONE (ROXICODONE) immediate release tablet 5 mg  Every 4 Hours PRN,   Status:  Discontinued        See Hyperspace for full Linked Orders Report.    01/18/24 0537    01/18/24 0537  oxyCODONE (ROXICODONE) immediate release tablet 10 mg  Every 4 Hours PRN,   Status:  Discontinued        See Hyperspace for full Linked Orders Report.    01/18/24 0537    01/17/24 1958  Blood Gas, Arterial -  As Needed,   Status:  Canceled       01/17/24 1958 01/16/24 0920  hydrALAZINE (APRESOLINE) injection 5-10 mg  Every 20 Minutes PRN,   Status:  Discontinued        See Hyperspace for full Linked Orders Report.    01/16/24 0921    01/16/24 0920  labetalol (NORMODYNE,TRANDATE) injection 20-80 mg  Every 10 Minutes PRN,   Status:  Discontinued        See Hyperspace for full Linked Orders Report.    01/16/24 0921    01/16/24 0920  NIFEdipine (PROCARDIA) capsule 10-20 mg  Every 20 Minutes PRN,   Status:  Discontinued        See Hyperspace for full Linked Orders Report.    01/16/24 0921    01/16/24 0000  Group B Streptococcus Culture - Swab, Vaginal/Rectum        Comments: This is an external result entered through the Results Console.      01/16/24 1921 12/22/23 0000  NovoLIN N FlexPen 100 UNIT/ML injection  Status:  Discontinued         01/11/24 1644 12/06/23 0000  Blood Glucose Monitoring Suppl (Accu-Chek Guide Me) w/Device kit         01/11/24 1644                  Operative/Procedure Notes (last 72 hours)  Notes from 01/21/24 0753 through 01/24/24 0753   No notes of this type exist for this encounter.         "  Physician Progress Notes (last 72 hours)        Mony Stock MD at 24 1131           POSTPARTUM ROUNDS    CC: POD 4 from CS    SUBJECTIVE:  Pain is controlled. The patient is tolerating a regular diet. She is ambulating. Her lochia is normal.   No headache,   Wants to be discharged,  Baby is well in NICU    ROS: mild cramping and incision pain  No N/V, no F/C, no leg pain, noHA    OBJECTIVE:  Vital Signs: /85 (BP Location: Left arm, Patient Position: Lying) Comment: nurse aware  Pulse 64   Temp 98 °F (36.7 °C) (Oral)   Resp 16   Ht 162.6 cm (64\")   Wt 118 kg (260 lb 9.6 oz)   LMP 2023 (Exact Date)   SpO2 99%   Breastfeeding Yes   BMI 44.73 kg/m²     Intake/Output Summary (Last 24 hours) at 2024 1131  Last data filed at 2024 0931  Gross per 24 hour   Intake 720 ml   Output 1500 ml   Net -780 ml       Constitutional: The patient is well nourished.  Cardiovascular:RRR  Resp: nonlabored breathing  The incision is clean, dry and Intact  Gastrointestinal: fundus firm below umbilicus  Extremities:no edema, non-tender bilaterally    LABS / IMAGING:  Lab Results (last 24 hours)       ** No results found for the last 24 hours. **            ASSESSMENT AND PLAN:    Patient Active Problem List   Diagnosis    33 weeks gestation of pregnancy    Rh negative status during pregnancy in first trimester    Group B streptococcal bacteriuria    Environmental allergies    Pregnancy    Severe pre-eclampsia in third trimester    Insulin controlled gestational diabetes mellitus (GDM) in third trimester     delivery delivered       BP is controlled on procardia and labetalol  Discharge today and follow up this week for BP check                   Mony Stock MD    2024  11:31 EST         Electronically signed by Mony Stock MD at 24 1133       Consult Notes (last 72 hours)  Notes from 24 0753 through 24 0753   No notes of this " type exist for this encounter.

## 2024-01-26 ENCOUNTER — CLINICAL SUPPORT (OUTPATIENT)
Dept: OBSTETRICS AND GYNECOLOGY | Age: 29
End: 2024-01-26
Payer: COMMERCIAL

## 2024-01-26 VITALS — DIASTOLIC BLOOD PRESSURE: 70 MMHG | SYSTOLIC BLOOD PRESSURE: 134 MMHG

## 2024-01-29 ENCOUNTER — MATERNAL SCREENING (OUTPATIENT)
Dept: CALL CENTER | Facility: HOSPITAL | Age: 29
End: 2024-01-29
Payer: COMMERCIAL

## 2024-01-29 NOTE — OUTREACH NOTE
Maternal Screening Survey      Flowsheet Row Responses   Facility patient discharged from? [facility]   Attempt successful? No   Unsuccessful attempts Attempt 1              Emelia TAYLOR - Registered Nurse

## 2024-01-29 NOTE — OUTREACH NOTE
Maternal Screening Survey      Flowsheet Row Responses   Facility patient discharged from? [facility]   Attempt successful? No   Unsuccessful attempts Attempt 2              Jose Angel DUVAL - Registered Nurse

## 2024-01-30 ENCOUNTER — POSTPARTUM VISIT (OUTPATIENT)
Dept: OBSTETRICS AND GYNECOLOGY | Age: 29
End: 2024-01-30
Payer: COMMERCIAL

## 2024-01-30 ENCOUNTER — MATERNAL SCREENING (OUTPATIENT)
Dept: CALL CENTER | Facility: HOSPITAL | Age: 29
End: 2024-01-30
Payer: COMMERCIAL

## 2024-01-30 VITALS
SYSTOLIC BLOOD PRESSURE: 126 MMHG | BODY MASS INDEX: 40.02 KG/M2 | HEIGHT: 64 IN | DIASTOLIC BLOOD PRESSURE: 74 MMHG | WEIGHT: 234.4 LBS

## 2024-01-30 DIAGNOSIS — O14.13 SEVERE PRE-ECLAMPSIA IN THIRD TRIMESTER: ICD-10-CM

## 2024-01-30 RX ORDER — ACETAMINOPHEN AND CODEINE PHOSPHATE 120; 12 MG/5ML; MG/5ML
1 SOLUTION ORAL DAILY
Qty: 84 TABLET | Refills: 3 | Status: SHIPPED | OUTPATIENT
Start: 2024-01-30 | End: 2025-01-29

## 2024-01-30 RX ORDER — NYSTATIN 100000 [USP'U]/G
POWDER TOPICAL 3 TIMES DAILY
Qty: 30 G | Refills: 2 | Status: SHIPPED | OUTPATIENT
Start: 2024-01-30

## 2024-01-30 NOTE — PROGRESS NOTES
"Chief Complaint   Patient presents with    Postpartum Follow-up     2 week PP Check, , baby girl, \"Leela\", 4lbs 10.4oz, pumping, interested in OCP, last pap 2022 (-)     Desi Espinoza is 2 weeks postop from  due to severe preeclampsia at 33 weeks and 4 days.  Her baby girl is still in the NICU but doing well.  Hopefully she will get to come home this week.  No concern for postpartum depression.  She is pumping breastmilk.  She continues to take her labetalol and nifedipine.  Blood pressure today is normal, can discontinue labetalol    /74   Ht 162.6 cm (64\")   Wt 106 kg (234 lb 6.4 oz)   LMP 2023 (Exact Date)   Breastfeeding Yes   BMI 40.23 kg/m²   Well, no distress  Regular, nonlabored breathing  Incision is healing well and intact, she has a salmon-colored rash concerning for possible yeast    A/P  Diagnoses and all orders for this visit:    1.  delivery delivered (Primary)    2. Severe pre-eclampsia in third trimester    Other orders  -     nystatin (MYCOSTATIN) 821152 UNIT/GM powder; Apply  topically to the appropriate area as directed 3 (Three) Times a Day.  Dispense: 30 g; Refill: 2  -     norethindrone (MICRONOR) 0.35 MG tablet; Take 1 tablet by mouth Daily.  Dispense: 84 tablet; Refill: 3      Desires OCPs postpartum, went and sent this send  Can discontinue labetalol and continue nifedipine  Nystatin powder sent in for yeast and also gave instructions regarding wound care    Paloma Rodriguez MD  2024  15:05 EST    "

## 2024-01-30 NOTE — OUTREACH NOTE
Maternal Screening Survey      Flowsheet Row Responses   Facility patient discharged from? [facility]   Attempt successful? No   Unsuccessful attempts Attempt 3   Revoke Decline to participate              PRATIK STEPHENSON - Registered Nurse

## 2024-02-13 ENCOUNTER — TELEPHONE (OUTPATIENT)
Facility: HOSPITAL | Age: 29
End: 2024-02-13
Payer: COMMERCIAL

## 2024-02-26 ENCOUNTER — POSTPARTUM VISIT (OUTPATIENT)
Dept: OBSTETRICS AND GYNECOLOGY | Age: 29
End: 2024-02-26
Payer: COMMERCIAL

## 2024-02-26 VITALS
BODY MASS INDEX: 40.12 KG/M2 | DIASTOLIC BLOOD PRESSURE: 62 MMHG | WEIGHT: 235 LBS | HEIGHT: 64 IN | SYSTOLIC BLOOD PRESSURE: 110 MMHG

## 2024-02-26 DIAGNOSIS — Z78.9 BREASTFEEDING (INFANT): ICD-10-CM

## 2024-02-26 DIAGNOSIS — B37.2 YEAST DERMATITIS: ICD-10-CM

## 2024-02-26 PROBLEM — Z3A.33 33 WEEKS GESTATION OF PREGNANCY: Status: RESOLVED | Noted: 2023-08-01 | Resolved: 2024-02-26

## 2024-02-26 PROBLEM — O14.13 SEVERE PRE-ECLAMPSIA IN THIRD TRIMESTER: Status: RESOLVED | Noted: 2024-01-11 | Resolved: 2024-02-26

## 2024-02-26 PROBLEM — Z34.90 PREGNANCY: Status: RESOLVED | Noted: 2024-01-11 | Resolved: 2024-02-26

## 2024-02-26 PROBLEM — R82.71 GROUP B STREPTOCOCCAL BACTERIURIA: Status: RESOLVED | Noted: 2023-08-07 | Resolved: 2024-02-26

## 2024-02-26 PROCEDURE — 0503F POSTPARTUM CARE VISIT: CPT

## 2024-02-26 NOTE — PROGRESS NOTES
"Subjective     No chief complaint on file.      Baby's name:norma Espinoza is a 28 y.o. female who presents for a postpartum visit delivery at 33/4 due to sever preeclampsia. She is 6 weeks postpartum following a low cervical transverse  section. I have fully reviewed the prenatal and intrapartum course. Postpartum course has been uneventful. Baby is feeding by breast milk through bottle . Bleeding has been normal in amount and decreasing.. Bowel function is normal. Bladder function is normal. Patient not sexually active at this time. Contraception method is discussed. Postpartum depression screening: negative. Baby is coming home tomorrow hopefully    The following portions of the patient's history were reviewed and updated as appropriate: allergies, current medications, past family history, past medical history, past social history, past surgical history and problem list.    Review of Systems  Pertinent items are noted in HPI.    Objective   /62   Ht 162.6 cm (64\")   Wt 107 kg (235 lb)   LMP  (LMP Unknown)   Breastfeeding Yes Comment: pumping  BMI 40.34 kg/m²    General:  alert    Breasts:  normal       Heart:  regular rate and rhytm   Abdomen: Incision is clean and intact some erythema to area and smell yeast    Vulva:  deferred   Vagina: deferred   Cervix:  deferred   Corpus: deferred   Adnexa:  deferred         Diagnoses and all orders for this visit:    1.  delivery delivered (Primary)    2. Pre-eclampsia, delivered    3. Yeast dermatitis    4. Breastfeeding (infant)       Normal postpartum exam. Pap smear was not done at today's visit.  Return for AE in 6 months  Recheck BP in 1 week  Can stop nifedipine check bp at home if increase 140/90 resume  Continue nystatin powder keep area clean and dry  Start POP discussed options while breastfeeding  Glucose check today was 117      Contraception: discussed   Slow return to normal activities reviewed. Continue prenatal " vitamins.   Follow up in 6 months  and 1 week for BP check    Problem List Items Addressed This Visit        delivery delivered - Primary     Other Visit Diagnoses       Pre-eclampsia, delivered        Yeast dermatitis        Breastfeeding (infant)                     EMR Dragon/ Transcription disclaimer:  Much of the encounter note is an electronic transcription/translation of spoken language to printed text. The electronic translation of spoken language may permit erroneous, or at times, nonessential words or phrases to be inadvertently transcribes; Although i have reviewed the note for such errors, some may still exist.

## 2024-03-04 ENCOUNTER — CLINICAL SUPPORT (OUTPATIENT)
Dept: OBSTETRICS AND GYNECOLOGY | Age: 29
End: 2024-03-04
Payer: COMMERCIAL

## 2024-03-04 VITALS — DIASTOLIC BLOOD PRESSURE: 72 MMHG | SYSTOLIC BLOOD PRESSURE: 114 MMHG

## 2024-08-29 ENCOUNTER — OFFICE VISIT (OUTPATIENT)
Dept: OBSTETRICS AND GYNECOLOGY | Age: 29
End: 2024-08-29
Payer: COMMERCIAL

## 2024-08-29 VITALS
DIASTOLIC BLOOD PRESSURE: 72 MMHG | HEIGHT: 64 IN | WEIGHT: 258.2 LBS | BODY MASS INDEX: 44.08 KG/M2 | SYSTOLIC BLOOD PRESSURE: 120 MMHG

## 2024-08-29 DIAGNOSIS — Z13.29 SCREENING FOR THYROID DISORDER: ICD-10-CM

## 2024-08-29 DIAGNOSIS — Z01.419 WELL FEMALE EXAM WITH ROUTINE GYNECOLOGICAL EXAM: Primary | ICD-10-CM

## 2024-08-29 DIAGNOSIS — N89.8 VAGINAL ITCHING: ICD-10-CM

## 2024-08-29 DIAGNOSIS — Z13.220 SCREENING FOR LIPID DISORDERS: ICD-10-CM

## 2024-08-29 DIAGNOSIS — Z13.228 SCREENING FOR METABOLIC DISORDER: ICD-10-CM

## 2024-08-29 DIAGNOSIS — Z11.51 SCREENING FOR HUMAN PAPILLOMAVIRUS (HPV): ICD-10-CM

## 2024-08-29 DIAGNOSIS — Z13.1 SCREENING FOR DIABETES MELLITUS: ICD-10-CM

## 2024-08-29 DIAGNOSIS — Z13.0 SCREENING, ANEMIA, DEFICIENCY, IRON: ICD-10-CM

## 2024-08-29 DIAGNOSIS — N92.6 MISSED MENSES: ICD-10-CM

## 2024-08-29 DIAGNOSIS — Z12.4 SCREENING FOR MALIGNANT NEOPLASM OF CERVIX: ICD-10-CM

## 2024-08-29 DIAGNOSIS — Z13.89 SCREENING FOR BLOOD OR PROTEIN IN URINE: ICD-10-CM

## 2024-08-29 DIAGNOSIS — Z30.41 ENCOUNTER FOR SURVEILLANCE OF CONTRACEPTIVE PILLS: ICD-10-CM

## 2024-08-29 LAB
B-HCG UR QL: NEGATIVE
BILIRUB BLD-MCNC: NEGATIVE MG/DL
EXPIRATION DATE: NORMAL
GLUCOSE UR STRIP-MCNC: ABNORMAL MG/DL
INTERNAL NEGATIVE CONTROL: NEGATIVE
INTERNAL POSITIVE CONTROL: POSITIVE
KETONES UR QL: NEGATIVE
LEUKOCYTE EST, POC: NEGATIVE
Lab: NORMAL
NITRITE UR-MCNC: NEGATIVE MG/ML
PH UR: 5.5 [PH] (ref 5–8)
PROT UR STRIP-MCNC: NEGATIVE MG/DL
RBC # UR STRIP: NEGATIVE /UL
SP GR UR: 1.02 (ref 1–1.03)
UROBILINOGEN UR QL: ABNORMAL

## 2024-08-29 RX ORDER — ACETAMINOPHEN AND CODEINE PHOSPHATE 120; 12 MG/5ML; MG/5ML
1 SOLUTION ORAL DAILY
Qty: 84 TABLET | Refills: 3 | Status: SHIPPED | OUTPATIENT
Start: 2024-08-29 | End: 2025-08-29

## 2024-08-29 NOTE — PROGRESS NOTES
"Subjective     History of Present Illness    Chief Complaint   Patient presents with    Gynecologic Exam     Ae- last pap 2022 Neg c/o dryness & Itching       Desi Espinoza is a 29 y.o. female who presents for annual exam.  C/o vaginal dryness and itchiness. This occurred a few weeks ago, she used monistat cream bought OTC which gave relief. But symptoms have now returned. Admits vaginal discharge that is white. No vaginal odor. Would like her urine checked for UTI. Declines STD testing.  Has only had one menses since delivery in January. Currently breastfeeding and on POP.  Had gestational diabetes, states she was tested for diabetes in the hospital and was negative.  Does not follow with PCP.  Agreeable to wellness labs today.  Social - daughter, Leela Vidales doing well. 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     Obstetric History:  OB History          1    Para   1    Term   0       1    AB   0    Living   1         SAB   0    IAB   0    Ectopic   0    Molar   0    Multiple   0    Live Births   1               Menstrual History:     No LMP recorded.           Current contraception: oral progesterone-only contraceptive  History of abnormal Pap smear: no  Received Gardasil immunization: no  Perform regular self breast exam: yes -    Family history of uterine or ovarian cancer: no  Family History of colon cancer: no  Family history of breast cancer: yes - mother dx 53 y/o    PAP: done today  Mammogram: not indicated  Colonoscopy: not indicated  DEXA: not indicated    Exercise: moderately active  Calcium/Vitamin D: adequate intake    The following portions of the patient's history were reviewed and updated as appropriate: allergies, current medications, past family history, past medical history, past social history, past surgical history, and problem list.    Review of Systems  Pertinent items are noted in HPI.       Objective   Physical Exam    /72   Ht 162.6 cm (64\")   Wt 117 kg (258 lb 3.2 oz)   " Breastfeeding Yes   BMI 44.32 kg/m²      General: alert, appears stated age, cooperative, and no distress   Heart: regular rate and rhythm, S1, S2 normal, no murmur, click, rub or gallop   Lungs: clear to auscultation bilaterally   Abdomen: soft, non-tender, without masses or organomegaly   Breast: inspection negative, no nipple discharge or bleeding, no masses or nodularity palpable   External genitalia/Vulva: External genitalia including bartholin's glands, Urethra, Petros's gland and urethra meatus are normal and Bladder appears normal without significant prolapse    Vagina: normal mucosa, normal discharge   Cervix: no lesions   Uterus: normal size and non-tender   Adnexa: normal adnexa and no mass, fullness, tenderness   Neurologic: Alert and Oriented x3   Psychiatric: Normal affect, judgement, and mood       Assessment & Plan   Diagnoses and all orders for this visit:    1. Well female exam with routine gynecological exam (Primary)  -     IGP, Apt HPV,rfx 16 / 18,45    2. Screening for human papillomavirus (HPV)  -     IGP, Apt HPV,rfx 16 / 18,45    3. Screening for malignant neoplasm of cervix  -     IGP, Apt HPV,rfx 16 / 18,45    4. Screening for blood or protein in urine  -     POC Urinalysis Dipstick    5. Missed menses  -     POC Pregnancy, Urine    6. Encounter for surveillance of contraceptive pills  -     norethindrone (MICRONOR) 0.35 MG tablet; Take 1 tablet by mouth Daily.  Dispense: 84 tablet; Refill: 3    7. Vaginal itching  -     NuSwab BV & Candida - Swab, Vagina    8. Screening, anemia, deficiency, iron  -     CBC (No Diff)    9. Screening for metabolic disorder  -     Comprehensive Metabolic Panel    10. Screening for diabetes mellitus  -     Hemoglobin A1c    11. Screening for lipid disorders  -     Lipid Panel    12. Screening for thyroid disorder  -     TSH Rfx On Abnormal To Free T4          PAP smear with HPV co-testing completed today  NuSwab for yeast and BV completed today  Blood tests:  CBC, CMP, A1c, Lipid panel, TSH.  POP refills sent  Urinalysis negative for UTI. Large amount of glucose in urine, checking A1c today.  Pregnancy test, result: negative.  Will call patient with results and treat accordingly.   All questions answered.  Breast self exam technique reviewed and patient encouraged to perform self-exam monthly.  Physical activity and regular exercise encouraged.  Discussed healthy lifestyle modifications.  Discussed calcium and vitamin D needs to prevent osteoporosis.      Return in 1 year (on 8/29/2025) for Annual exam.

## 2024-08-30 DIAGNOSIS — E11.9 TYPE 2 DIABETES MELLITUS WITHOUT COMPLICATION, WITHOUT LONG-TERM CURRENT USE OF INSULIN: Primary | ICD-10-CM

## 2024-08-30 LAB
A VAGINAE DNA VAG QL NAA+PROBE: NORMAL SCORE
ALBUMIN SERPL-MCNC: 4.5 G/DL (ref 3.9–4.9)
ALP SERPL-CCNC: 103 IU/L (ref 44–121)
ALT SERPL-CCNC: 41 IU/L (ref 0–32)
AST SERPL-CCNC: 21 IU/L (ref 0–40)
BILIRUB SERPL-MCNC: 0.2 MG/DL (ref 0–1.2)
BUN SERPL-MCNC: 14 MG/DL (ref 6–20)
BUN/CREAT SERPL: 21 (ref 9–23)
BVAB2 DNA VAG QL NAA+PROBE: NORMAL SCORE
C ALBICANS DNA VAG QL NAA+PROBE: NEGATIVE
C GLABRATA DNA VAG QL NAA+PROBE: NEGATIVE
CALCIUM SERPL-MCNC: 9.4 MG/DL (ref 8.7–10.2)
CHLORIDE SERPL-SCNC: 95 MMOL/L (ref 96–106)
CHOLEST SERPL-MCNC: 278 MG/DL (ref 100–199)
CO2 SERPL-SCNC: 23 MMOL/L (ref 20–29)
CREAT SERPL-MCNC: 0.66 MG/DL (ref 0.57–1)
EGFRCR SERPLBLD CKD-EPI 2021: 120 ML/MIN/1.73
ERYTHROCYTE [DISTWIDTH] IN BLOOD BY AUTOMATED COUNT: 11.9 % (ref 11.7–15.4)
GLOBULIN SER CALC-MCNC: 2.4 G/DL (ref 1.5–4.5)
GLUCOSE SERPL-MCNC: 313 MG/DL (ref 70–99)
HBA1C MFR BLD: 10.4 % (ref 4.8–5.6)
HCT VFR BLD AUTO: 47.5 % (ref 34–46.6)
HDLC SERPL-MCNC: 41 MG/DL
HGB BLD-MCNC: 15.1 G/DL (ref 11.1–15.9)
LDLC SERPL CALC-MCNC: 141 MG/DL (ref 0–99)
MCH RBC QN AUTO: 29 PG (ref 26.6–33)
MCHC RBC AUTO-ENTMCNC: 31.8 G/DL (ref 31.5–35.7)
MCV RBC AUTO: 91 FL (ref 79–97)
MEGA1 DNA VAG QL NAA+PROBE: NORMAL SCORE
PLATELET # BLD AUTO: 263 X10E3/UL (ref 150–450)
POTASSIUM SERPL-SCNC: 3.9 MMOL/L (ref 3.5–5.2)
PROT SERPL-MCNC: 6.9 G/DL (ref 6–8.5)
RBC # BLD AUTO: 5.2 X10E6/UL (ref 3.77–5.28)
SODIUM SERPL-SCNC: 133 MMOL/L (ref 134–144)
TRIGL SERPL-MCNC: 514 MG/DL (ref 0–149)
TSH SERPL DL<=0.005 MIU/L-ACNC: 0.52 UIU/ML (ref 0.45–4.5)
VLDLC SERPL CALC-MCNC: 96 MG/DL (ref 5–40)
WBC # BLD AUTO: 8.1 X10E3/UL (ref 3.4–10.8)

## 2024-09-03 LAB
CYTOLOGIST CVX/VAG CYTO: NORMAL
CYTOLOGY CVX/VAG DOC CYTO: NORMAL
CYTOLOGY CVX/VAG DOC THIN PREP: NORMAL
DX ICD CODE: NORMAL
HPV I/H RISK 4 DNA CVX QL PROBE+SIG AMP: NEGATIVE
Lab: NORMAL
OTHER STN SPEC: NORMAL
STAT OF ADQ CVX/VAG CYTO-IMP: NORMAL

## 2024-09-04 DIAGNOSIS — B37.31 YEAST INFECTION OF THE VAGINA: Primary | ICD-10-CM

## 2024-09-04 RX ORDER — FLUCONAZOLE 150 MG/1
150 TABLET ORAL DAILY
Qty: 1 TABLET | Refills: 0 | Status: SHIPPED | OUTPATIENT
Start: 2024-09-04

## 2024-11-05 DIAGNOSIS — B37.31 YEAST INFECTION OF THE VAGINA: ICD-10-CM

## 2024-11-05 RX ORDER — FLUCONAZOLE 150 MG/1
150 TABLET ORAL DAILY
Qty: 1 TABLET | Refills: 0 | Status: CANCELLED | OUTPATIENT
Start: 2024-11-05

## 2024-11-08 DIAGNOSIS — B37.31 YEAST INFECTION OF THE VAGINA: ICD-10-CM

## 2024-11-08 RX ORDER — FLUCONAZOLE 150 MG/1
150 TABLET ORAL DAILY
Qty: 1 TABLET | Refills: 0 | OUTPATIENT
Start: 2024-11-08

## 2024-11-08 RX ORDER — FLUCONAZOLE 150 MG/1
150 TABLET ORAL DAILY
Qty: 1 TABLET | Refills: 0 | Status: SHIPPED | OUTPATIENT
Start: 2024-11-08

## 2024-11-08 RX ORDER — FLUCONAZOLE 150 MG/1
150 TABLET ORAL DAILY
Qty: 1 TABLET | Refills: 0 | Status: CANCELLED | OUTPATIENT
Start: 2024-11-08

## 2025-07-31 ENCOUNTER — OFFICE VISIT (OUTPATIENT)
Dept: OBSTETRICS AND GYNECOLOGY | Age: 30
End: 2025-07-31
Payer: COMMERCIAL

## 2025-07-31 VITALS
DIASTOLIC BLOOD PRESSURE: 82 MMHG | WEIGHT: 235.6 LBS | BODY MASS INDEX: 40.22 KG/M2 | SYSTOLIC BLOOD PRESSURE: 124 MMHG | HEIGHT: 64 IN

## 2025-07-31 DIAGNOSIS — Z30.011 ENCOUNTER FOR INITIAL PRESCRIPTION OF CONTRACEPTIVE PILLS: Primary | ICD-10-CM

## 2025-07-31 RX ORDER — BLOOD SUGAR DIAGNOSTIC
STRIP MISCELLANEOUS
COMMUNITY
Start: 2025-06-02 | End: 2026-06-01

## 2025-07-31 RX ORDER — LEVONORGESTREL/ETHIN.ESTRADIOL 0.1-0.02MG
1 TABLET ORAL DAILY
Qty: 84 TABLET | Refills: 3 | Status: SHIPPED | OUTPATIENT
Start: 2025-07-31 | End: 2026-07-31

## 2025-07-31 RX ORDER — LORATADINE PSEUDOEPHEDRINE SULFATE 10; 240 MG/1; MG/1
1 TABLET, EXTENDED RELEASE ORAL DAILY
COMMUNITY

## 2025-07-31 RX ORDER — LISINOPRIL 5 MG/1
5 TABLET ORAL DAILY
COMMUNITY
Start: 2025-05-13 | End: 2026-05-13

## 2025-07-31 RX ORDER — SEMAGLUTIDE 0.68 MG/ML
INJECTION, SOLUTION SUBCUTANEOUS
COMMUNITY
Start: 2025-05-14

## 2025-07-31 NOTE — PROGRESS NOTES
"Subjective     History of Present Illness  Desi Espinoza is a 30 y.o.  female is being seen today for   Chief Complaint   Patient presents with    Follow-up     CC: f/u on BC. Currently on norethindrone. Pt states she wants to switch due to inconsistency and heavy periods. Pt states she isn't sure which one she'd like to try next possibly a different pill      C/o irregular periods with birth control pills x 4 months.  She is currently on progesterone only birth control pills Micronor since having her daughter last year.  She stopped breast-feeding 7 months ago in January.  She had her first menses 4 months ago in March and they have been irregular since.  Sometimes having 2 menses in 1 month.  Menses last 7 days and having a heavier flow for 3 to 4 days, changes pad/tampon every few hours.  Also having more cramps and nausea.  She is also on metformin and Ozempic for diabetes.  She was previously on OCPs before having her daughter and had light regular menses with these.  She would like to go back on OCPs. No hx blood clots.       Relevant data reviewed:      The following portions of the patient's history were reviewed and updated as appropriate: allergies, current medications, past family history, past medical history, past social history, past surgical history and problem list.    /82   Ht 162.6 cm (64\")   Wt 107 kg (235 lb 9.6 oz)   LMP 07/10/2025 (Approximate)   Breastfeeding No   BMI 40.44 kg/m²         Review of Systems   Constitutional: Negative.    HENT: Negative.     Eyes: Negative.    Respiratory: Negative.     Cardiovascular: Negative.    Gastrointestinal: Negative.    Endocrine: Negative.    Genitourinary:  Positive for menstrual problem.   Musculoskeletal: Negative.    Skin: Negative.    Allergic/Immunologic: Negative.    Neurological: Negative.    Hematological: Negative.    Psychiatric/Behavioral: Negative.         Objective   Physical Exam  Constitutional:       General: She is " not in acute distress.  Cardiovascular:      Rate and Rhythm: Normal rate and regular rhythm.      Pulses: Normal pulses.      Heart sounds: Normal heart sounds.   Pulmonary:      Effort: Pulmonary effort is normal.      Breath sounds: Normal breath sounds.   Neurological:      Mental Status: She is alert and oriented to person, place, and time.   Psychiatric:         Mood and Affect: Mood normal.         Behavior: Behavior normal.         Thought Content: Thought content normal.         Judgment: Judgment normal.           Assessment & Plan   Diagnoses and all orders for this visit:    1. Encounter for initial prescription of contraceptive pills (Primary)  -     levonorgestrel-ethinyl estradiol (Vienva) 0.1-20 MG-MCG per tablet; Take 1 tablet by mouth Daily.  Dispense: 84 tablet; Refill: 3    -The use of the oral contraceptive has been fully discussed with the patient. This includes the proper method to initiate (i.e. Sunday start after next normal menstrual onset) and continue the pills, the need for regular compliance to ensure adequate contraceptive effect, and warnings about anticipated minor side effects such as breakthrough spotting, nausea, breast tenderness, headaches, etc.  She has been told of the more serious potential side effects such as blood clots/DVT and to get to ER if these signs/symptoms occur.  She should back up the pill with a condom for the first month. She understands and wishes to take the medication as prescribed. OCP sent to pharmacy.   -Annual Exam already scheduled for 9/4/25, we will f/u on OCPs at that time.    All questions answered. Patient verbalizes understanding and is agreeable to plan.  Return for Next scheduled follow up.         Carmen Osuna PA-C  7/31/2025 11:06 EDT

## (undated) DEVICE — RETR P/OP PANNICULUS ABBY XL

## (undated) DEVICE — Device

## (undated) DEVICE — SOL IRR H2O BTL 1000ML STRL

## (undated) DEVICE — GLV SURG BIOGEL LTX PF 6 1/2

## (undated) DEVICE — SUT VIC 0 CT 36IN UD VCP958H

## (undated) DEVICE — SUT MNCRYL PLS ANTIB UD 4/0 PS2 18IN

## (undated) DEVICE — NDL BLNT 18G 1 1/2IN

## (undated) DEVICE — SUT VIC 3/0 CT1 36IN UD VCP944H

## (undated) DEVICE — SUT VIC 0 CT1 36IN UD VCP946H